# Patient Record
Sex: FEMALE | Race: WHITE | NOT HISPANIC OR LATINO | Employment: UNEMPLOYED | URBAN - METROPOLITAN AREA
[De-identification: names, ages, dates, MRNs, and addresses within clinical notes are randomized per-mention and may not be internally consistent; named-entity substitution may affect disease eponyms.]

---

## 2017-01-16 ENCOUNTER — APPOINTMENT (OUTPATIENT)
Dept: CT IMAGING | Facility: CLINIC | Age: 47
End: 2017-01-16
Attending: INTERNAL MEDICINE

## 2017-01-16 ENCOUNTER — HOSPITAL ENCOUNTER (EMERGENCY)
Facility: CLINIC | Age: 47
Discharge: HOME | End: 2017-01-16
Attending: INTERNAL MEDICINE | Admitting: INTERNAL MEDICINE

## 2017-01-16 VITALS
DIASTOLIC BLOOD PRESSURE: 98 MMHG | RESPIRATION RATE: 12 BRPM | SYSTOLIC BLOOD PRESSURE: 187 MMHG | TEMPERATURE: 98.2 F | OXYGEN SATURATION: 98 %

## 2017-01-16 DIAGNOSIS — J18.9 PNEUMONIA DUE TO INFECTIOUS ORGANISM, UNSPECIFIED LATERALITY, UNSPECIFIED PART OF LUNG: ICD-10-CM

## 2017-01-16 DIAGNOSIS — R04.2 HEMOPTYSIS: ICD-10-CM

## 2017-01-16 LAB
ANION GAP SERPL CALCULATED.3IONS-SCNC: 8 MMOL/L (ref 3–14)
BASOPHILS # BLD AUTO: 0.1 10E9/L (ref 0–0.2)
BASOPHILS NFR BLD AUTO: 0.8 %
BUN SERPL-MCNC: 13 MG/DL (ref 7–30)
CALCIUM SERPL-MCNC: 8.7 MG/DL (ref 8.5–10.1)
CHLORIDE SERPL-SCNC: 105 MMOL/L (ref 94–109)
CO2 SERPL-SCNC: 28 MMOL/L (ref 20–32)
CREAT SERPL-MCNC: 0.8 MG/DL (ref 0.52–1.04)
DIFFERENTIAL METHOD BLD: NORMAL
EOSINOPHIL # BLD AUTO: 0.5 10E9/L (ref 0–0.7)
EOSINOPHIL NFR BLD AUTO: 4.6 %
ERYTHROCYTE [DISTWIDTH] IN BLOOD BY AUTOMATED COUNT: 13.9 % (ref 10–15)
GFR SERPL CREATININE-BSD FRML MDRD: 77 ML/MIN/1.7M2
GLUCOSE SERPL-MCNC: 105 MG/DL (ref 70–99)
HCT VFR BLD AUTO: 42.1 % (ref 35–47)
HGB BLD-MCNC: 13.4 G/DL (ref 11.7–15.7)
IMM GRANULOCYTES # BLD: 0 10E9/L (ref 0–0.4)
IMM GRANULOCYTES NFR BLD: 0.4 %
LYMPHOCYTES # BLD AUTO: 2.2 10E9/L (ref 0.8–5.3)
LYMPHOCYTES NFR BLD AUTO: 20.3 %
MCH RBC QN AUTO: 28.7 PG (ref 26.5–33)
MCHC RBC AUTO-ENTMCNC: 31.8 G/DL (ref 31.5–36.5)
MCV RBC AUTO: 90 FL (ref 78–100)
MONOCYTES # BLD AUTO: 0.8 10E9/L (ref 0–1.3)
MONOCYTES NFR BLD AUTO: 7.3 %
NEUTROPHILS # BLD AUTO: 7.1 10E9/L (ref 1.6–8.3)
NEUTROPHILS NFR BLD AUTO: 66.6 %
NRBC # BLD AUTO: 0 10*3/UL
NRBC BLD AUTO-RTO: 0 /100
PLATELET # BLD AUTO: 271 10E9/L (ref 150–450)
POTASSIUM SERPL-SCNC: 3.8 MMOL/L (ref 3.4–5.3)
RBC # BLD AUTO: 4.67 10E12/L (ref 3.8–5.2)
SODIUM SERPL-SCNC: 141 MMOL/L (ref 133–144)
WBC # BLD AUTO: 10.6 10E9/L (ref 4–11)

## 2017-01-16 PROCEDURE — 80048 BASIC METABOLIC PNL TOTAL CA: CPT | Performed by: INTERNAL MEDICINE

## 2017-01-16 PROCEDURE — 25000125 ZZHC RX 250: Performed by: INTERNAL MEDICINE

## 2017-01-16 PROCEDURE — 25500064 ZZH RX 255 OP 636: Performed by: INTERNAL MEDICINE

## 2017-01-16 PROCEDURE — 94640 AIRWAY INHALATION TREATMENT: CPT

## 2017-01-16 PROCEDURE — 99285 EMERGENCY DEPT VISIT HI MDM: CPT | Mod: 25

## 2017-01-16 PROCEDURE — 85025 COMPLETE CBC W/AUTO DIFF WBC: CPT | Performed by: INTERNAL MEDICINE

## 2017-01-16 PROCEDURE — 25000128 H RX IP 250 OP 636: Performed by: INTERNAL MEDICINE

## 2017-01-16 PROCEDURE — 40000275 ZZH STATISTIC RCP TIME EA 10 MIN

## 2017-01-16 PROCEDURE — 71260 CT THORAX DX C+: CPT

## 2017-01-16 RX ORDER — IPRATROPIUM BROMIDE AND ALBUTEROL SULFATE 2.5; .5 MG/3ML; MG/3ML
3 SOLUTION RESPIRATORY (INHALATION) ONCE
Status: COMPLETED | OUTPATIENT
Start: 2017-01-16 | End: 2017-01-16

## 2017-01-16 RX ORDER — IOPAMIDOL 755 MG/ML
500 INJECTION, SOLUTION INTRAVASCULAR ONCE
Status: COMPLETED | OUTPATIENT
Start: 2017-01-16 | End: 2017-01-16

## 2017-01-16 RX ORDER — ALBUTEROL SULFATE 90 UG/1
2 AEROSOL, METERED RESPIRATORY (INHALATION) EVERY 4 HOURS PRN
Qty: 1 INHALER | Refills: 0 | Status: SHIPPED | OUTPATIENT
Start: 2017-01-16 | End: 2024-02-14

## 2017-01-16 RX ORDER — CODEINE PHOSPHATE AND GUAIFENESIN 10; 100 MG/5ML; MG/5ML
1 SOLUTION ORAL EVERY 4 HOURS PRN
Qty: 120 ML | Refills: 0 | Status: SHIPPED | OUTPATIENT
Start: 2017-01-16 | End: 2024-02-14

## 2017-01-16 RX ORDER — AZITHROMYCIN 250 MG/1
TABLET, FILM COATED ORAL
Qty: 6 TABLET | Refills: 0 | Status: SHIPPED | OUTPATIENT
Start: 2017-01-16 | End: 2017-01-21

## 2017-01-16 RX ADMIN — IOPAMIDOL 85 ML: 755 INJECTION, SOLUTION INTRAVENOUS at 15:41

## 2017-01-16 RX ADMIN — SODIUM CHLORIDE 100 ML: 9 INJECTION, SOLUTION INTRAVENOUS at 15:41

## 2017-01-16 RX ADMIN — IPRATROPIUM BROMIDE AND ALBUTEROL SULFATE 3 ML: .5; 3 SOLUTION RESPIRATORY (INHALATION) at 13:32

## 2017-01-16 ASSESSMENT — ENCOUNTER SYMPTOMS
NAUSEA: 0
ABDOMINAL PAIN: 0
COUGH: 1
VOMITING: 0
DIARRHEA: 0

## 2017-01-16 NOTE — ED AVS SNAPSHOT
[facility] Emergency Department    [address]    [facility] [address]    Phone:  [phone]    Fax:  [phone]                                       Shilpa Miramontes   MRN: [id number]    Department:  [facility] Emergency Department   Date of Visit:  1/16/2017           After Visit Summary Signature Page     I have received my discharge instructions, and my questions have been answered. I have discussed any challenges I see with this plan with the nurse or doctor.    ..........................................................................................................................................  Patient/Patient Representative Signature      ..........................................................................................................................................  Patient Representative Print Name and Relationship to Patient    ..................................................               ................................................  Date                                            Time    ..........................................................................................................................................  Reviewed by Signature/Title    ...................................................              ..............................................  Date                                                            Time

## 2017-01-16 NOTE — ED NOTES
Around Alex started with fatigue, body aches and fever. No cough. Two weeks later was feeling better and then started with cough. One week history of blood sputum with cough. Denies night sweats. Notes if she is outside, cough becomes more severe. No fever for the past two weeks. Has not seen her primary doctor, here today because she is not getting better.

## 2017-01-16 NOTE — DISCHARGE INSTRUCTIONS
Discharge Instructions  Bronchitis, Pneumonia, Bronchospasm    You were seen today for a chest infection or inflammation. If your doctor decided this was due to a bacterial infection, you may need an antibiotic. Sometimes these are caused by a virus, and then an antibiotic will not help.     Return to the Emergency Department if:    Your breathing gets much worse.    You are very weak, or feel much more ill.    You develop new symptoms, such as chest pain.    You cough up blood.    You are vomiting enough that you can t keep fluids or your medicine down.    What can I do to help myself?    Fill any prescriptions the doctor gave you and take them right away--especially antibiotics. Be sure to finish the whole antibiotic prescription.    You may be given a prescription for an inhaler, which can help loosen tight air passages.  Use this as needed, but not more often than directed. Inhalers work much better when used with a spacer.     You may be given a prescription for a steroid to reduce inflammation. Used long-term, these can have many serious side effects, but for short courses these do not happen. You may notice restlessness or increased appetite.        You may use non-prescription cough or cold medicines. Cough medicines may help, but don t make the cough go away completely.     Avoid smoke, because this can make your symptoms worse. If you smoke, this may be a good time to quit! Consider using nicotine lozenges, gum, or patches to reduce cravings.     If you have a fever, Tylenol  (acetaminophen), Motrin  (ibuprofen), or Advil  (ibuprofen) may help bring fever down and may help you feel more comfortable. Be sure to read and follow the package directions, and ask your doctor if you have questions.    Be sure to get your flu shot each year.  The pneumonia shot can help prevent pneumonia.  Probiotics: If you have been given an antibiotic, you may want to also take a probiotic pill or eat yogurt with live cultures.  "Probiotics have \"good bacteria\" to help your intestines stay healthy. Studies have shown that probiotics help prevent diarrhea and other intestine problems (including C. diff infection) when you take antibiotics. You can buy these without a prescription in the pharmacy section of the store.     If your doctor has told you to follow-up at your clinic, be sure to call right away and go to your appointment.  If there is any problem with keeping your appointment, call your doctor or return to the Emergency Department.    If you were given a prescription for medicine here today, be sure to read all of the information (including the package insert) that comes with your prescription.  This will include important information about the medicine, its side effects, and any warnings that you need to know about.  The pharmacist who fills the prescription can provide more information and answer questions you may have about the medicine.  If you have questions or concerns that the pharmacist cannot address, please call or return to the Emergency Department.     Opioid Medication Information    Pain medications are among the most commonly prescribed medicines, so we are including this information for all our patients. If you did not receive pain medication or get a prescription for pain medicine, you can ignore it.     You may have been given a prescription for an opioid (narcotic) pain medicine and/or have received a pain medicine while here in the Emergency Department. These medicines can make you drowsy or impaired. You must not drive, operate dangerous equipment, or engage in any other dangerous activities while taking these medications. If you drive while taking these medications, you could be arrested for DUI, or driving under the influence. Do not drink any alcohol while you are taking these medications.     Opioid pain medications can cause addiction. If you have a history of chemical dependency of any type, you are at a " higher risk of becoming addicted to pain medications.  Only take these prescribed medications to treat your pain when all other options have been tried. Take it for as short a time and as few doses as possible. Store your pain pills in a secure place, as they are frequently stolen and provide a dangerous opportunity for children or visitors in your house to start abusing these powerful medications. We will not replace any lost or stolen medicine.  As soon as your pain is better, you should flush all your remaining medication.     Many prescription pain medications contain Tylenol  (acetaminophen), including Vicodin , Tylenol #3 , Norco , Lortab , and Percocet .  You should not take any extra pills of Tylenol  if you are using these prescription medications or you can get very sick.  Do not ever take more than 3000 mg of acetaminophen in any 24 hour period.    All opioids tend to cause constipation. Drink plenty of water and eat foods that have a lot of fiber, such as fruits, vegetables, prune juice, apple juice and high fiber cereal.  Take a laxative if you don t move your bowels at least every other day. Miralax , Milk of Magnesia, Colace , or Senna  can be used to keep you regular.      Remember that you can always come back to the Emergency Department if you are not able to see your regular doctor in the amount of time listed above, if you get any new symptoms, or if there is anything that worries you.

## 2017-01-16 NOTE — ED NOTES
New RN introduced to pt at bedside report. Pt states the neb helped and she is feeling better. Pt updated that she is still in line for CT. No other needs or complaints at this time. ABCs intact, pt A&Ox3

## 2017-01-16 NOTE — ED AVS SNAPSHOT
[facility] Emergency Department    [address] [place] [address]    Phone:  [phone]    Fax:  [phone]                                       Shilpa Miramontes   MRN: [id number]    Department:  [facility] Emergency Department   Date of Visit:  1/16/2017           Patient Information     Date Of Birth          1970        Your diagnoses for this visit were:     Pneumonia due to infectious organism, unspecified laterality, unspecified part of lung     Hemoptysis        You were seen by Silvia Bah MD.      Follow-up Information     Follow up with Mel Murphy MD In 3 days.    Specialty:  Family Practice    Contact information:    XXX NO INFO FOUND XXX  [address]  [place] [address]  [phone]          Discharge Instructions       Discharge Instructions  Bronchitis, Pneumonia, Bronchospasm    You were seen today for a chest infection or inflammation. If your doctor decided this was due to a bacterial infection, you may need an antibiotic. Sometimes these are caused by a virus, and then an antibiotic will not help.     Return to the Emergency Department if:    Your breathing gets much worse.    You are very weak, or feel much more ill.    You develop new symptoms, such as chest pain.    You cough up blood.    You are vomiting enough that you can t keep fluids or your medicine down.    What can I do to help myself?    Fill any prescriptions the doctor gave you and take them right away--especially antibiotics. Be sure to finish the whole antibiotic prescription.    You may be given a prescription for an inhaler, which can help loosen tight air passages.  Use this as needed, but not more often than directed. Inhalers work much better when used with a spacer.     You may be given a prescription for a steroid to reduce inflammation. Used long-term, these can have many serious side effects, but for short courses these do not  "happen. You may notice restlessness or increased appetite.        You may use non-prescription cough or cold medicines. Cough medicines may help, but don t make the cough go away completely.     Avoid smoke, because this can make your symptoms worse. If you smoke, this may be a good time to quit! Consider using nicotine lozenges, gum, or patches to reduce cravings.     If you have a fever, Tylenol  (acetaminophen), Motrin  (ibuprofen), or Advil  (ibuprofen) may help bring fever down and may help you feel more comfortable. Be sure to read and follow the package directions, and ask your doctor if you have questions.    Be sure to get your flu shot each year.  The pneumonia shot can help prevent pneumonia.  Probiotics: If you have been given an antibiotic, you may want to also take a probiotic pill or eat yogurt with live cultures. Probiotics have \"good bacteria\" to help your intestines stay healthy. Studies have shown that probiotics help prevent diarrhea and other intestine problems (including C. diff infection) when you take antibiotics. You can buy these without a prescription in the pharmacy section of the store.     If your doctor has told you to follow-up at your clinic, be sure to call right away and go to your appointment.  If there is any problem with keeping your appointment, call your doctor or return to the Emergency Department.    If you were given a prescription for medicine here today, be sure to read all of the information (including the package insert) that comes with your prescription.  This will include important information about the medicine, its side effects, and any warnings that you need to know about.  The pharmacist who fills the prescription can provide more information and answer questions you may have about the medicine.  If you have questions or concerns that the pharmacist cannot address, please call or return to the Emergency Department.     Opioid Medication Information    Pain " medications are among the most commonly prescribed medicines, so we are including this information for all our patients. If you did not receive pain medication or get a prescription for pain medicine, you can ignore it.     You may have been given a prescription for an opioid (narcotic) pain medicine and/or have received a pain medicine while here in the Emergency Department. These medicines can make you drowsy or impaired. You must not drive, operate dangerous equipment, or engage in any other dangerous activities while taking these medications. If you drive while taking these medications, you could be arrested for DUI, or driving under the influence. Do not drink any alcohol while you are taking these medications.     Opioid pain medications can cause addiction. If you have a history of chemical dependency of any type, you are at a higher risk of becoming addicted to pain medications.  Only take these prescribed medications to treat your pain when all other options have been tried. Take it for as short a time and as few doses as possible. Store your pain pills in a secure place, as they are frequently stolen and provide a dangerous opportunity for children or visitors in your house to start abusing these powerful medications. We will not replace any lost or stolen medicine.  As soon as your pain is better, you should flush all your remaining medication.     Many prescription pain medications contain Tylenol  (acetaminophen), including Vicodin , Tylenol #3 , Norco , Lortab , and Percocet .  You should not take any extra pills of Tylenol  if you are using these prescription medications or you can get very sick.  Do not ever take more than 3000 mg of acetaminophen in any 24 hour period.    All opioids tend to cause constipation. Drink plenty of water and eat foods that have a lot of fiber, such as fruits, vegetables, prune juice, apple juice and high fiber cereal.  Take a laxative if you don t move your bowels at  least every other day. Miralax , Milk of Magnesia, Colace , or Senna  can be used to keep you regular.      Remember that you can always come back to the Emergency Department if you are not able to see your regular doctor in the amount of time listed above, if you get any new symptoms, or if there is anything that worries you.        24 Hour Appointment Hotline       To make an appointment at any [facility], call [phone]). If you don't have a family doctor or clinic, we will help you find one. [facility] are conveniently located to serve the needs of you and your family.             Review of your medicines      START taking        Dose / Directions Last dose taken    albuterol 108 (90 BASE) MCG/ACT Inhaler   Commonly known as:  albuterol   Dose:  2 puff   Quantity:  1 Inhaler        Inhale 2 puffs into the lungs every 4 hours as needed for shortness of breath / dyspnea   Refills:  0        azithromycin 250 MG tablet   Commonly known as:  ZITHROMAX Z-JAMAAL   Quantity:  6 tablet        Two tablets on the first day, then one tablet daily for the next 4 days   Refills:  0        guaiFENesin-codeine 100-10 MG/5ML Soln solution   Commonly known as:  ROBITUSSIN AC   Dose:  1 tsp.   Quantity:  120 mL        Take 5 mLs by mouth every 4 hours as needed for cough   Refills:  0          Our records show that you are taking the medicines listed below. If these are incorrect, please call your family doctor or clinic.        Dose / Directions Last dose taken    amoxicillin-clavulanate 875-125 MG per tablet   Commonly known as:  AUGMENTIN   Dose:  1 tablet   Quantity:  20 tablet        Take 1 tablet by mouth 2 times daily   Refills:  0                Prescriptions were sent or printed at these locations (3 Prescriptions)                   Other Prescriptions                Printed at Department/Unit printer (3 of 3)         azithromycin (ZITHROMAX Z-JAMAAL) 250 MG tablet                guaiFENesin-codeine (ROBITUSSIN AC) 100-10 MG/5ML SOLN solution               albuterol (ALBUTEROL) 108 (90 BASE) MCG/ACT Inhaler                Procedures and tests performed during your visit     Basic metabolic panel    CBC with platelets differential    CT Chest Pulmonary Embolism w Contrast    Cardiac Continuous Monitoring    Peripheral IV: Standard    Pulse oximetry nursing      Orders Needing Specimen Collection     None      Pending Results     No orders found from 1/15/2017 to 1/17/2017.            Pending Culture Results     No orders found from 1/15/2017 to 1/17/2017.       Test Results from your hospital stay           1/16/2017  1:55 PM - Interface, Flexilab Results      Component Results     Component Value Ref Range & Units Status    WBC 10.6 4.0 - 11.0 10e9/L Final    RBC Count 4.67 3.8 - 5.2 10e12/L Final    Hemoglobin 13.4 11.7 - 15.7 g/dL Final    Hematocrit 42.1 35.0 - 47.0 % Final    MCV 90 78 - 100 fl Final    MCH 28.7 26.5 - 33.0 pg Final    MCHC 31.8 31.5 - 36.5 g/dL Final    RDW 13.9 10.0 - 15.0 % Final    Platelet Count 271 150 - 450 10e9/L Final    Diff Method Automated Method  Final    % Neutrophils 66.6 % Final    % Lymphocytes 20.3 % Final    % Monocytes 7.3 % Final    % Eosinophils 4.6 % Final    % Basophils 0.8 % Final    % Immature Granulocytes 0.4 % Final    Nucleated RBCs 0 0 /100 Final    Absolute Neutrophil 7.1 1.6 - 8.3 10e9/L Final    Absolute Lymphocytes 2.2 0.8 - 5.3 10e9/L Final    Absolute Monocytes 0.8 0.0 - 1.3 10e9/L Final    Absolute Eosinophils 0.5 0.0 - 0.7 10e9/L Final    Absolute Basophils 0.1 0.0 - 0.2 10e9/L Final    Abs Immature Granulocytes 0.0 0 - 0.4 10e9/L Final    Absolute Nucleated RBC 0.0  Final         1/16/2017  2:08 PM - Interface, Flexilab Results      Component Results     Component Value Ref Range & Units Status    Sodium 141 133 - 144 mmol/L Final    Potassium 3.8 3.4 - 5.3 mmol/L Final    Chloride 105 94 - 109 mmol/L Final    Carbon Dioxide 28 20 -  32 mmol/L Final    Anion Gap 8 3 - 14 mmol/L Final    Glucose 105 (H) 70 - 99 mg/dL Final    Urea Nitrogen 13 7 - 30 mg/dL Final    Creatinine 0.80 0.52 - 1.04 mg/dL Final    GFR Estimate 77 >60 mL/min/1.7m2 Final    Non  GFR Calc    GFR Estimate If Black >90   GFR Calc   >60 mL/min/1.7m2 Final    Calcium 8.7 8.5 - 10.1 mg/dL Final         1/16/2017  4:11 PM - Interface, Radiant Ib      Narrative     CT CHEST PULMONARY EMBOLISM WITH CONTRAST  1/16/2017 3:43 PM     HISTORY:  Hemoptysis. Chest pain.    COMPARISON: None.    TECHNIQUE: CT pulmonary angiogram was performed following the  administration of 85 mL Isovue-370 contrast. Radiation dose for this  scan was reduced using automated exposure control, adjustment of the  mA and/or kV according to patient size, or iterative reconstruction  technique.    FINDINGS: There is no definite pulmonary embolism.    There is a small right pleural effusion. There appear to be focal  infiltrates in the right lower lobe and left upper lobe.        Impression     IMPRESSION:   1. No definite pulmonary embolism.  2. Focal infiltrates in the lungs as above.  Correlate clinically for  acute pneumonia.   3. Small right pleural effusion.    BECKY KIRAN MD                Clinical Quality Measure: Blood Pressure Screening     Your blood pressure was checked while you were in the emergency department today. The last reading we obtained was  BP: (!) 171/96 mmHg . Please read the guidelines below about what these numbers mean and what you should do about them.  If your systolic blood pressure (the top number) is less than 120 and your diastolic blood pressure (the bottom number) is less than 80, then your blood pressure is normal. There is nothing more that you need to do about it.  If your systolic blood pressure (the top number) is 120-139 or your diastolic blood pressure (the bottom number) is 80-89, your blood pressure may be higher than it should be.  "You should have your blood pressure rechecked within a year by a primary care provider.  If your systolic blood pressure (the top number) is 140 or greater or your diastolic blood pressure (the bottom number) is 90 or greater, you may have high blood pressure. High blood pressure is treatable, but if left untreated over time it can put you at risk for heart attack, stroke, or kidney failure. You should have your blood pressure rechecked by a primary care provider within the next 4 weeks.  If your provider in the emergency department today gave you specific instructions to follow-up with your doctor or provider even sooner than that, you should follow that instruction and not wait for up to 4 weeks for your follow-up visit.        Thank you for choosing [facility]       Thank you for choosing [facility] for your care. Our goal is always to provide you with excellent care. Hearing back from our patients is one way we can continue to improve our services. Please take a few minutes to complete the written survey that you may receive in the mail after you visit with us. Thank you!        [employer] Information     [employer] lets you send messages to your doctor, view your test results, renew your prescriptions, schedule appointments and more. To sign up, go to [email or web address] . Click on \"Log in\" on the left side of the screen, which will take you to the Welcome page. Then click on \"Sign up Now\" on the right side of the page.     You will be asked to enter the access code listed below, as well as some personal information. Please follow the directions to create your username and password.     Your access code is: [email or web address]  Expires: 2017  4:17 PM     Your access code will  in 90 days. If you need help or a new code, please call your [facility] clinic or [phone].        Care EveryWhere ID     This is your Care EveryWhere ID. This could be used by other organizations to access your [facility] medical " records  [id number]        After Visit Summary       This is your record. Keep this with you and show to your community pharmacist(s) and doctor(s) at your next visit.

## 2017-01-16 NOTE — ED PROVIDER NOTES
History     Chief Complaint:  Cough      ELLIOT Miramontes is a 46 year old female who presents to the emergency department today for evaluation of cough. The patient stated that she had cold symptoms which included fatigue, body aches, and fever late December which she notes improved two weeks later. The patient then began to have increased cough, especially when laying down, working, or going outside, that was accompanied with blood sputum. The patient stated that she initially had wheezing but does not have one now. She also stated that she has intermittent abdominal pain since the onset of her symptoms and report generalized back pain. Furthermore, the patient noted that she is usually a heavy bleeder during her menstrual cycles but only noticed spotting during her last period. The patient denied nausea, vomiting, diarrhea, constipation, chance of pregnancy or history of blood clots. Of note, the patient stated that she noticed increased leg swelling and believed that it was primary caused by her high sodium diet.    Allergies:  Percocet     Medications:    Augmentin      Past Medical History:    History reviewed. No pertinent past medical history.     Past Surgical History:    History reviewed. No pertinent past surgical history.     Family History:    Paternal Grandfather: Prostate cancer      Social History:  The patient was accompanied to the ED by partner.  Smoking Status: Current every day smoker  Smokeless Tobacco: Never used  Alcohol Use: Yes  Marital Status:  Single [1]     Review of Systems   Respiratory: Positive for cough.    Cardiovascular: Positive for leg swelling.   Gastrointestinal: Negative for nausea, vomiting, abdominal pain and diarrhea.   All other systems reviewed and are negative.    Physical Exam   Vitals:  Patient Vitals for the past 24 hrs:   BP Temp Temp src Heart Rate Resp SpO2   01/16/17 1222 (!) 170/111 mmHg 98.2  F (36.8  C) Oral 88 16 99 %        Physical Exam    Constitutional: She is cooperative.   HENT:   Right Ear: Tympanic membrane normal.   Left Ear: Tympanic membrane normal.   Mouth/Throat: Oropharynx is clear and moist and mucous membranes are normal.   Eyes: Conjunctivae are normal.   Neck: Normal range of motion.   Cardiovascular: Regular rhythm.    Murmur heard.  Pulmonary/Chest: Effort normal and breath sounds normal.   Abdominal: Soft. Normal appearance and bowel sounds are normal. There is no rebound and no guarding.   Musculoskeletal: Normal range of motion.   Lymphadenopathy:     She has no cervical adenopathy.   Neurological: She is alert.   Skin: Skin is warm and dry.   Psychiatric: She has a normal mood and affect.     Emergency Department Course   Imaging:  Radiology findings were communicated with the patient who voiced understanding of the findings.    CT Chest Pulmonary Embolism w Contrast:  1. No definite pulmonary embolism.  2. Focal infiltrates in the lungs as above.   3. Correlate clinically for acute pneumonia.   4. Small right pleural effusion.  Reading per radiology      Laboratory:  Laboratory findings were communicated with the patient who voiced understanding of the findings.    CBC: AWNL. (WBC 10.6, HGB 13.4, )     Basic metabolic panel: Glucose: 105(H)    Interventions:  1332- Duoneb 3 mL Nebulizer    1541-  mL IV    Emergency Department Course:  Nursing notes and vitals reviewed.  I performed an exam of the patient as documented above.     IV was inserted and blood was drawn for laboratory testing, results above.    The patient was sent for a CT while in the emergency department, results above.     I discussed the treatment plan with the patient. They expressed understanding of this plan and consented to discharge.    I personally reviewed the laboratory results with the Patient and answered all related questions prior to discharge.    Impression & Plan      Medical Decision Making:  Shilpa Miramontes is a 46 year old female  who presents to the emergency department with prolonged cough and now several episodes of hemoptysis. With this presentation I felt it was necessary to rule out serious diagnosis serious diagnosis including pulmonary embolism, tumor. Fortunately CT shows findings  consistent with pneumonia but no PE or other significant  abnormality. Here we did give a Duoneb which made the patient feel a lot better. Repeat exam shows improved air entry. I will discharge her on oral Zithromax, Albuterol metered dose inhaler to improve mucociliary clearance, Robitussin AC for cough, return  if worse or new symptoms. Follow up within 2-3 days in clinic. Discussed limiting or quitting smoking.        Diagnosis:    ICD-10-CM    1. Pneumonia due to infectious organism, unspecified laterality, unspecified part of lung J18.9    2. Hemoptysis R04.2          Disposition:   The patient was discharge.        Discharge Medications:  New Prescriptions    ALBUTEROL (ALBUTEROL) 108 (90 BASE) MCG/ACT INHALER    Inhale 2 puffs into the lungs every 4 hours as needed for shortness of breath / dyspnea    AZITHROMYCIN (ZITHROMAX Z-JAMAAL) 250 MG TABLET    Two tablets on the first day, then one tablet daily for the next 4 days    GUAIFENESIN-CODEINE (ROBITUSSIN AC) 100-10 MG/5ML SOLN SOLUTION    Take 5 mLs by mouth every 4 hours as needed for cough       Scribe Disclosure:  I, Germaine Andino, am serving as a scribe at 1:14 PM on 1/16/2017 to document services personally performed by Silvia Bah MD, based on my observations and the provider's statements to me.    1/16/2017   [facility] EMERGENCY DEPARTMENT        Silvia Bah MD  01/20/17 [id number]

## 2017-07-01 ENCOUNTER — HEALTH MAINTENANCE LETTER (OUTPATIENT)
Age: 47
End: 2017-07-01

## 2018-01-24 ENCOUNTER — HOSPITAL ENCOUNTER (EMERGENCY)
Facility: CLINIC | Age: 48
Discharge: HOME | End: 2018-01-24
Attending: EMERGENCY MEDICINE | Admitting: EMERGENCY MEDICINE
Payer: MEDICAID

## 2018-01-24 ENCOUNTER — APPOINTMENT (OUTPATIENT)
Dept: GENERAL RADIOLOGY | Facility: CLINIC | Age: 48
End: 2018-01-24
Attending: EMERGENCY MEDICINE
Payer: MEDICAID

## 2018-01-24 VITALS
OXYGEN SATURATION: 91 % | HEIGHT: 62 IN | RESPIRATION RATE: 24 BRPM | WEIGHT: 260 LBS | TEMPERATURE: 101.7 F | HEART RATE: 90 BPM | DIASTOLIC BLOOD PRESSURE: 75 MMHG | BODY MASS INDEX: 47.84 KG/M2 | SYSTOLIC BLOOD PRESSURE: 148 MMHG

## 2018-01-24 DIAGNOSIS — R03.0 ELEVATED BLOOD-PRESSURE READING WITHOUT DIAGNOSIS OF HYPERTENSION: ICD-10-CM

## 2018-01-24 DIAGNOSIS — Z87.891 PERSONAL HISTORY OF TOBACCO USE, PRESENTING HAZARDS TO HEALTH: ICD-10-CM

## 2018-01-24 DIAGNOSIS — J98.4 LUNG DISEASE: ICD-10-CM

## 2018-01-24 DIAGNOSIS — J18.9 PNEUMONIA OF RIGHT LOWER LOBE DUE TO INFECTIOUS ORGANISM: ICD-10-CM

## 2018-01-24 LAB
FLUAV+FLUBV AG SPEC QL: NEGATIVE
FLUAV+FLUBV AG SPEC QL: NEGATIVE
SPECIMEN SOURCE: NORMAL

## 2018-01-24 PROCEDURE — 94664 DEMO&/EVAL PT USE INHALER: CPT

## 2018-01-24 PROCEDURE — 99285 EMERGENCY DEPT VISIT HI MDM: CPT | Mod: 25

## 2018-01-24 PROCEDURE — 94640 AIRWAY INHALATION TREATMENT: CPT

## 2018-01-24 PROCEDURE — 25000125 ZZHC RX 250: Performed by: EMERGENCY MEDICINE

## 2018-01-24 PROCEDURE — 87804 INFLUENZA ASSAY W/OPTIC: CPT | Mod: 91 | Performed by: EMERGENCY MEDICINE

## 2018-01-24 PROCEDURE — 71046 X-RAY EXAM CHEST 2 VIEWS: CPT

## 2018-01-24 PROCEDURE — 99284 EMERGENCY DEPT VISIT MOD MDM: CPT | Mod: 25

## 2018-01-24 PROCEDURE — 40000275 ZZH STATISTIC RCP TIME EA 10 MIN

## 2018-01-24 PROCEDURE — 25000132 ZZH RX MED GY IP 250 OP 250 PS 637: Performed by: EMERGENCY MEDICINE

## 2018-01-24 RX ORDER — INHALER, ASSIST DEVICES
1 SPACER (EA) MISCELLANEOUS ONCE
Status: COMPLETED | OUTPATIENT
Start: 2018-01-24 | End: 2018-01-24

## 2018-01-24 RX ORDER — IPRATROPIUM BROMIDE AND ALBUTEROL SULFATE 2.5; .5 MG/3ML; MG/3ML
3 SOLUTION RESPIRATORY (INHALATION)
Status: DISPENSED | OUTPATIENT
Start: 2018-01-24 | End: 2018-01-24

## 2018-01-24 RX ORDER — ACETAMINOPHEN 500 MG
1000 TABLET ORAL ONCE
Status: COMPLETED | OUTPATIENT
Start: 2018-01-24 | End: 2018-01-24

## 2018-01-24 RX ORDER — ALBUTEROL SULFATE 90 UG/1
2 AEROSOL, METERED RESPIRATORY (INHALATION) ONCE
Status: COMPLETED | OUTPATIENT
Start: 2018-01-24 | End: 2018-01-24

## 2018-01-24 RX ORDER — LEVOFLOXACIN 750 MG/1
750 TABLET, FILM COATED ORAL DAILY
Qty: 5 TABLET | Refills: 0 | Status: SHIPPED | OUTPATIENT
Start: 2018-01-24 | End: 2024-02-14

## 2018-01-24 RX ADMIN — ACETAMINOPHEN 1000 MG: 500 TABLET, FILM COATED ORAL at 17:55

## 2018-01-24 RX ADMIN — IPRATROPIUM BROMIDE AND ALBUTEROL SULFATE 3 ML: .5; 3 SOLUTION RESPIRATORY (INHALATION) at 17:54

## 2018-01-24 RX ADMIN — Medication 1 EACH: at 19:29

## 2018-01-24 RX ADMIN — ALBUTEROL SULFATE 2 PUFF: 90 AEROSOL, METERED RESPIRATORY (INHALATION) at 19:23

## 2018-01-24 RX ADMIN — IPRATROPIUM BROMIDE AND ALBUTEROL SULFATE 3 ML: .5; 3 SOLUTION RESPIRATORY (INHALATION) at 18:24

## 2018-01-24 RX ADMIN — LEVOFLOXACIN 750 MG: 500 TABLET, FILM COATED ORAL at 18:44

## 2018-01-24 ASSESSMENT — ENCOUNTER SYMPTOMS
NAUSEA: 1
RHINORRHEA: 0
VOMITING: 0
SHORTNESS OF BREATH: 1
COUGH: 1
MYALGIAS: 1
ABDOMINAL PAIN: 0
SORE THROAT: 0
FEVER: 1
DIARRHEA: 0

## 2018-01-24 NOTE — ED AVS SNAPSHOT
[facility] Emergency Department    [address]    [facility] [address]    Phone:  [phone]    Fax:  [phone]                                       Shilpa Miramontes   MRN: [id number]    Department:  [facility] Emergency Department   Date of Visit:  1/24/2018           After Visit Summary Signature Page     I have received my discharge instructions, and my questions have been answered. I have discussed any challenges I see with this plan with the nurse or doctor.    ..........................................................................................................................................  Patient/Patient Representative Signature      ..........................................................................................................................................  Patient Representative Print Name and Relationship to Patient    ..................................................               ................................................  Date                                            Time    ..........................................................................................................................................  Reviewed by Signature/Title    ...................................................              ..............................................  Date                                                            Time

## 2018-01-24 NOTE — ED PROVIDER NOTES
History     Chief Complaint:  Cough    HPI   Shilpa Miramontes is a 47 year old female, pack per day smoker, who presents for evaluation of cough and fever. The patient reports she developed body aches and fever 4-5 days ago, followed by a cough 3-4 days ago. She reports her cough has been more productive over the last couple of days and she feels short of breath at times with the cough. The patient noticed a small amount of blood in her sputum today and was concerned because she had hemoptysis with her prior episode of pneumonia about a year ago, prompting her to come to the ED for evaluation. The patient notes some nausea as well but denies any abdominal pain, vomiting, diarrhea, sore throat, or ear pain. No chest pain, shortness of breath, leg pain or swelling. The patient has never been diagnosed with asthma or COPD but is a long time smoker.     Allergies:  Percocet     Medications:    The patient is not currently taking any prescribed medications.    Past Medical History:    Hypertension, not on medications    Past Surgical History:    Cholecystectomy    Family History:    History reviewed. No pertinent family history.     Social History:  Smoking status: Current 1 ppd smoker  Alcohol use: Yes, occasional   Marital Status:  Single [1]     Review of Systems   Constitutional: Positive for fever.   HENT: Negative for ear pain, rhinorrhea and sore throat.    Respiratory: Positive for cough and shortness of breath.    Cardiovascular: Negative for chest pain and leg swelling.   Gastrointestinal: Positive for nausea. Negative for abdominal pain, diarrhea and vomiting.   Musculoskeletal: Positive for myalgias.   All other systems reviewed and are negative.      Physical Exam   Patient Vitals for the past 24 hrs:   BP Temp Temp src Heart Rate Resp SpO2 Height Weight   01/24/18 1824 - - - - - 98 % - -   01/24/18 1732 - - - - - 95 % - -   01/24/18 1731 (!) 180/104 - - 88 - - - -   01/24/18 1653 195/90 101.7  F (38.7  " C) Oral - - - - -   01/24/18 1647 - - - 88 24 90 % 1.575 m (5' 2\") 117.9 kg (260 lb)     Physical Exam  General: Well appearing, nontoxic. Resting comfortably. Room smells of cigarette smoke  Head:  Scalp, face, and head appear normal  Eyes:  Pupils are equal, round, and reactive to light    Conjunctivae non-injected and sclerae white  ENT:    The external nose is normal    Pinnae are normal. Bilateral TMs clear without erythema, bulging, or effusion. Auditory canals normal.     The oropharynx is normal, mucous membranes moist    Posterior pharynx clear without swelling, exudates or erythema    Uvula is in the midline  Neck:  Normal range of motion    There is no rigidity noted    Trachea is in the midline  CV:  Regular rate and rhythm     Normal S1/S2, no S3/S4    No murmur or rub  Resp:  Lungs are equal bilaterally    Diffuse expiratory wheezing throughout all lung fields.  Prolonged expiratory phase.    There is no tachypnea    No increased work of breathing    No rales or rhonchi  GI:  Abdomen is soft, no rigidity or guarding    No distension, or mass    No tenderness or rebound tenderness   MS:  Normal muscular tone    Symmetric motor strength    No lower extremity edema  Skin:  No rash or acute skin lesions noted  Neuro: Awake and alert    Speech is normal and fluent    Moves all extremities spontaneously  Psych:  Normal affect.  Appropriate interactions.      Emergency Department Course   Imaging:  Radiographic findings were communicated with the patient who voiced understanding of the findings.    XR Chest 2 views  1. Right basilar pneumonia.  2. Pulmonary vascular congestion.  As read by Radiology.    Laboratory:  Influenza: Negative    Interventions:    1755: Tylenol 1,000 mg oral  1824: Duoneb 3 mL  1844: Levaquin 750 mg oral  1923: Albuterol MDI inhaler and spacer device    Emergency Department Course:  Past medical records, nursing notes, and vitals reviewed.  1739: I performed an exam of the patient " and obtained history, as documented above.  Influenza sent, results above.   The patient was sent for a chest x-ray while in the emergency department, findings above.    1827: I rechecked the patient and discussed findings.     I spent between 3-5 minutes counseling patient in tobacco cessation.     I rechecked the patient.  Findings and plan explained to the Patient. Patient discharged home with instructions regarding supportive care, medications, and reasons to return. The importance of close follow-up was reviewed.     Impression & Plan      Medical Decision Making:  Shilpa Miramontes is a 47 year old female who presents for evaluation of cough and fever.  History, physical exam and imaging studies are consistent with pneumonia.  First dose of antibiotics given in ED.  There are no signs of complications of pneumonia at this point such as septic shock, bacteremia, empyema, hypoxia, respiratory failure or compromise.  Supportive outpatient management is therefore indicated. This seems to be community acquired pneumonia and there are no risk factors at this point for coverage of antibiotic-resistant strains.  I doubt PE, dissection, acute coronary syndrome, or other worrisome etiology for patients symptoms. Patient is a pack per day smoker and based on her pulmonary exam today, I suspect there may be a component of undiagnosed COPD.  She was provided with duo nebs in the emergency department and had improvement in her cough and subjective shortness of breath. This was discussed with the patient and I did spend 3-5 minutes counseling the patient in smoking cessation.  We discussed the possibility that she may have undiagnosed COPD and that it is very important for her to follow-up closely with her primary care physician as well as a lung specialist.  I would not expect the degree of findings on examination bilaterally from a single isolated basilar pneumonia without underlying lung disease. Patient will follow-up  with primary care physician regardless of disease course within 2 days maximum.  Signs for return visit to ED were discussed with patient and pneumonia precautions given for home. Patient was provided with an albuterol inhaler with spacer to use as needed at home. Return precautions were discussed with patient. The patient's questions were answered and the patient was agreeable with discharge.      Diagnosis:    ICD-10-CM   1. Pneumonia of right lower lobe due to infectious organism (H) J18.1   2. Lung disease J98.4   3. Personal history of tobacco use, presenting hazards to health Z87.891       Disposition: Discharged to home    Discharge Medications:  New Prescriptions    LEVOFLOXACIN (LEVAQUIN) 750 MG TABLET    Take 1 tablet (750 mg) by mouth daily       Che Jeong  1/24/2018   [facility] EMERGENCY DEPARTMENT    IChe, am serving as a scribe at 5:39 PM on 1/24/2018 to document services personally performed by Kwame Li MD based on my observations and the provider's statements to me.        Kwame Li MD  01/24/18 1936

## 2018-01-24 NOTE — ED AVS SNAPSHOT
[facility] Emergency Department    [address]    [facility] [address]    Phone:  [phone]    Fax:  [phone]                                       Shilpa Miramontes   MRN: [id number]    Department:  [facility] Emergency Department   Date of Visit:  1/24/2018           Patient Information     Date Of Birth          1970        Your diagnoses for this visit were:     Pneumonia of right lower lobe due to infectious organism (H)     Lung disease     Personal history of tobacco use, presenting hazards to health     Elevated blood-pressure reading without diagnosis of hypertension        You were seen by Kwame Li MD.      Follow-up Information     Follow up with [facility]. Schedule an appointment as soon as possible for a visit in 1 week.    Why:  For close follow up    Contact information:    [facility]  [address]  [address]  [place] [address]  Appointments:  [phone]        Follow up with [facility]. Schedule an appointment as soon as possible for a visit in 2 days.    Specialty:  Family Medicine    Why:  For close follow up    Contact information:    [address] [facility] [address] [phone]        Discharge Instructions         Your lung exam today was significantly abnormal. More than I would expect for just having pneumonia.  It is likely that your smoking is having damaging effects to your lungs. If you have lung disease it makes it much easier for you to get pneumonia and harder to recover from it. It will be very important for you to follow up with the [place] [facility] and your Primary Care physician. If lung disease is treated early it can prevent many serious problems including early death and lung cancer. Consider stopping smoking, this is THE SINGLE MOST IMPORTANT THING TO HELP YOUR LUNGS. Your primary care doctor as well as the  resources on www.smokefree.gov can help you with this process.    Use the inhaler 2 puffs with the spacer every 4 hours as needed for shortness of breath or cough.    Pneumonia (Adult)  Pneumonia is an infection deep within the lungs. It is in the small air sacs (alveoli). Pneumonia may be caused by a virus or bacteria. Pneumonia caused by bacteria is usually treated with an antibiotic. Severe cases may need to be treated in the hospital. Milder cases can be treated at home. Symptoms usually start to get better during the first 2 days of treatment.    Home care  Follow these guidelines when caring for yourself at home:    Rest at home for the first 2 to 3 days, or until you feel stronger. Don t let yourself get overly tired when you go back to your activities.    Stay away from cigarette smoke - yours or other people s.    You may use acetaminophen or ibuprofen to control fever or pain, unless another medicine was prescribed. If you have chronic liver or kidney disease, talk with your healthcare provider before using these medicines. Also talk with your provider if you ve had a stomach ulcer or gastrointestinal bleeding. Don t give aspirin to anyone younger than 18 years of age who is ill with a fever. It may cause severe liver damage.    Your appetite may be poor, so a light diet is fine.    Drink 6 to 8 glasses of fluids every day to make sure you are getting enough fluids. Beverages can include water, sport drinks, sodas without caffeine, juices, tea, or soup. Fluids will help loosen secretions in the lung. This will make it easier for you to cough up the phlegm (sputum). If you also have heart or kidney disease, check with your healthcare provider before you drink extra fluids.    Take antibiotic medicine prescribed until it is all gone, even if you are feeling better after a few days.  Follow-up care  Follow up with your healthcare provider in the next 2 to 3 days, or as advised. This is to be sure the medicine  is helping you get better.  If you are 65 or older, you should get a pneumococcal vaccine and a yearly flu (influenza) shot. You should also get these vaccines if you have chronic lung disease like asthma, emphysema, or COPD. Recently, a second type of pneumonia vaccine has become available for everyone over 65 years old. This is in addition to the previous vaccine. Ask your provider about this.  When to seek medical advice  Call your healthcare provider right away if any of these occur:    You don t get better within the first 48 hours of treatment    Shortness of breath gets worse    Rapid breathing (more than 25 breaths per minute)    Coughing up blood    Chest pain gets worse with breathing    Fever of 100.4 F (38 C) or higher that doesn t get better with fever medicine    Weakness, dizziness, or fainting that gets worse    Thirst or dry mouth that gets worse    Sinus pain, headache, or a stiff neck    Chest pain not caused by coughing  Date Last Reviewed: 1/1/2017 2000-2017 [employer]. [address] [place] [address]. All rights reserved. This information is not intended as a substitute for professional medical care. Always follow your healthcare professional's instructions.        Getting Support for Quitting Smoking  You don t have to go through the process of quitting smoking without support. Tell people you are quitting. The support of friends, coworkers, and family members can make a big difference. Face-to-face or telephone counseling can also be helpful, as can a stop-smoking class or an ex-smokers  group.    Set a quit date  If you re serious about quitting smoking, choose a date within the next 2 to 4 weeks. Bartolo it in bright, bold letters on a calendar you use often. Tell people about your quit date. Ask for their support. Let your friends and family know how they can help you quit.  Make a contract  A quit-smoking contract gives you a goal. Write out the contract and sign  it. Have it witnessed, if you like. Then keep the contract where you ll see it often, or carry it with you. Read the contract when you re tempted to smoke.  Take action  On the day you quit, reread your quit contract. Think about the benefits you gain by quitting, such as better health and an improved sense of taste.    Remove cigarettes from your home, car, or any other place where you stash them.    Throw away all smoking materials, including matches, lighters, and ashtrays.    Review your list of triggers and your plan for coping with them.    Stay away from people or settings you link with smoking.    Make a survival kit that includes gum, mints, carrot sticks, and things to keep your hands busy.    Talk to your healthcare provider about using quit-smoking products, such as medication or a nicotine patch, inhaler, nasal spray, gum, or lozenges.  Ask for help  Sometimes you may just need to talk when you miss smoking. Ex-smokers are good to talk to, because they re likely to know how you feel. You may need extra support in the first few weeks after you quit. Ask a friend to call you each day to see how you re doing. Telephone counseling can also help you keep on track. Ask your healthcare provider, local hospital, or public health department to put you in touch with a phone counselor. You may also have to deal with doubters when you decide to quit. Explain to any doubters why you are quitting. Tell them that quitting is important to you. Ask for their support. Tell your smoking buddies that you can walk together instead of smoking together. If someone thinks you won t succeed, say that you have a good quit plan and ask for their support. Let him or her know you re sticking with it.     For more information    https://smokefree.gov/zden-ms-ve-expert    National Cancer [place] Smoking Quitline: 877-44U-QUIT (929-409-3723)   Date Last Reviewed: 2/1/2017 2000-2017 [employer]. [address], [place] [address]. All rights reserved. This information is not intended as a substitute for professional medical care. Always follow your healthcare professional's instructions.          24 Hour Appointment Hotline       To make an appointment at any [facility], call [phone] ([phone]). If you don't have a family doctor or clinic, we will help you find one. [facility] clinics are conveniently located to serve the needs of you and your family.             Review of your medicines      START taking        Dose / Directions Last dose taken    levofloxacin 750 MG tablet   Commonly known as:  LEVAQUIN   Dose:  750 mg   Quantity:  5 tablet        Take 1 tablet (750 mg) by mouth daily   Refills:  0          Our records show that you are taking the medicines listed below. If these are incorrect, please call your family doctor or clinic.        Dose / Directions Last dose taken    albuterol 108 (90 BASE) MCG/ACT Inhaler   Commonly known as:  PROAIR HFA   Dose:  2 puff   Quantity:  1 Inhaler        Inhale 2 puffs into the lungs every 4 hours as needed for shortness of breath / dyspnea   Refills:  0        amoxicillin-clavulanate 875-125 MG per tablet   Commonly known as:  AUGMENTIN   Dose:  1 tablet   Quantity:  20 tablet        Take 1 tablet by mouth 2 times daily   Refills:  0        guaiFENesin-codeine 100-10 MG/5ML Soln solution   Commonly known as:  ROBITUSSIN AC   Dose:  1 tsp.   Quantity:  120 mL        Take 5 mLs by mouth every 4 hours as needed for cough   Refills:  0                Prescriptions were sent or printed at these locations (1 Prescription)                   Other Prescriptions                Printed at Department/Unit printer (1 of 1)         levofloxacin (LEVAQUIN) 750 MG tablet                Procedures and tests performed during your visit     Influenza A/B antigen    Peak flow, pre and post neb tx    XR Chest 2 Views      Orders Needing Specimen Collection     None      Pending  Results     Date and Time Order Name Status Description    1/24/2018 1747 XR Chest 2 Views Preliminary             Pending Culture Results     No orders found from 1/22/2018 to 1/25/2018.            Pending Results Instructions     If you had any lab results that were not finalized at the time of your Discharge, you can call the ED Lab Result RN at [phone]. You will be contacted by this team for any positive Lab results or changes in treatment. The nurses are available 7 days a week from 10A to 6:30P.  You can leave a message 24 hours per day and they will return your call.        Test Results From Your Hospital Stay        1/24/2018  5:43 PM      Component Results     Component Value Ref Range & Units Status    Influenza A/B Agn Specimen Nasal  Final    Influenza A Negative NEG^Negative Final    Influenza B Negative NEG^Negative Final    Test results must be correlated with clinical data. If necessary, results   should be confirmed by a molecular assay or viral culture.           1/24/2018  6:20 PM      Narrative     CHEST TWO VIEW  1/24/2018 6:15 PM      HISTORY: Cough, dyspnea.      COMPARISON: None.    FINDINGS: The heart is at the upper limits normal in size. There is  pulmonary vascular congestion. There is infiltrate in the right lung  base laterally. No other focal lung consolidation. No pneumothorax or  pleural effusion. Degenerative disease in the spine.        Impression     IMPRESSION:  1. Right basilar pneumonia.  2. Pulmonary vascular congestion.                Clinical Quality Measure: Blood Pressure Screening     Your blood pressure was checked while you were in the emergency department today. The last reading we obtained was  BP: 148/75 . Please read the guidelines below about what these numbers mean and what you should do about them.  If your systolic blood pressure (the top number) is less than 120 and your diastolic blood pressure (the bottom number) is less than 80, then your blood pressure  "is normal. There is nothing more that you need to do about it.  If your systolic blood pressure (the top number) is 120-139 or your diastolic blood pressure (the bottom number) is 80-89, your blood pressure may be higher than it should be. You should have your blood pressure rechecked within a year by a primary care provider.  If your systolic blood pressure (the top number) is 140 or greater or your diastolic blood pressure (the bottom number) is 90 or greater, you may have high blood pressure. High blood pressure is treatable, but if left untreated over time it can put you at risk for heart attack, stroke, or kidney failure. You should have your blood pressure rechecked by a primary care provider within the next 4 weeks.  If your provider in the emergency department today gave you specific instructions to follow-up with your doctor or provider even sooner than that, you should follow that instruction and not wait for up to 4 weeks for your follow-up visit.        Thank you for choosing [facility]       Thank you for choosing [facility] for your care. Our goal is always to provide you with excellent care. Hearing back from our patients is one way we can continue to improve our services. Please take a few minutes to complete the written survey that you may receive in the mail after you visit with us. Thank you!        [facility] Information     [employer] lets you send messages to your doctor, view your test results, renew your prescriptions, schedule appointments and more. To sign up, go to [email or web address] . Click on \"Log in\" on the left side of the screen, which will take you to the Welcome page. Then click on \"Sign up Now\" on the right side of the page.     You will be asked to enter the access code listed below, as well as some personal information. Please follow the directions to create your username and password.     Your access code is: [email or web address]  Expires: 2018  7:35 PM     Your access code will  " in 90 days. If you need help or a new code, please call your [facility] clinic or [phone].        Care EveryWhere ID     This is your Care EveryWhere ID. This could be used by other organizations to access your [facility] medical records  [id number]        Equal Access to Services     PREETI SCHWARTZ : Brianda Macedo, waaxda luqadaha, qaybta kaalmaalexus cisneros, caitlyn nava. So [facility] [phone].    ATENCIÓN: Si habla español, tiene a jones disposición servicios gratuitos de asistencia lingüística. Llame al [phone].    We comply with applicable federal civil rights laws and [place] laws. We do not discriminate on the basis of race, color, national origin, age, disability, sex, sexual orientation, or gender identity.            After Visit Summary       This is your record. Keep this with you and show to your community pharmacist(s) and doctor(s) at your next visit.

## 2018-01-24 NOTE — ED NOTES
Pt has difficulty breathing with shortness of breath for past 3 days and getting worse.  She had fever at home to 101.4 along with general body aches.

## 2018-01-25 NOTE — DISCHARGE INSTRUCTIONS
Your lung exam today was significantly abnormal. More than I would expect for just having pneumonia.  It is likely that your smoking is having damaging effects to your lungs. If you have lung disease it makes it much easier for you to get pneumonia and harder to recover from it. It will be very important for you to follow up with the [facility] and your Primary Care physician. If lung disease is treated early it can prevent many serious problems including early death and lung cancer. Consider stopping smoking, this is THE SINGLE MOST IMPORTANT THING TO HELP YOUR LUNGS. Your primary care doctor as well as the resources on www.smokefree.gov can help you with this process.    Use the inhaler 2 puffs with the spacer every 4 hours as needed for shortness of breath or cough.    Pneumonia (Adult)  Pneumonia is an infection deep within the lungs. It is in the small air sacs (alveoli). Pneumonia may be caused by a virus or bacteria. Pneumonia caused by bacteria is usually treated with an antibiotic. Severe cases may need to be treated in the hospital. Milder cases can be treated at home. Symptoms usually start to get better during the first 2 days of treatment.    Home care  Follow these guidelines when caring for yourself at home:    Rest at home for the first 2 to 3 days, or until you feel stronger. Don t let yourself get overly tired when you go back to your activities.    Stay away from cigarette smoke - yours or other people s.    You may use acetaminophen or ibuprofen to control fever or pain, unless another medicine was prescribed. If you have chronic liver or kidney disease, talk with your healthcare provider before using these medicines. Also talk with your provider if you ve had a stomach ulcer or gastrointestinal bleeding. Don t give aspirin to anyone younger than 18 years of age who is ill with a fever. It may cause severe liver damage.    Your appetite may be poor, so a light diet is fine.    Drink  6 to 8 glasses of fluids every day to make sure you are getting enough fluids. Beverages can include water, sport drinks, sodas without caffeine, juices, tea, or soup. Fluids will help loosen secretions in the lung. This will make it easier for you to cough up the phlegm (sputum). If you also have heart or kidney disease, check with your healthcare provider before you drink extra fluids.    Take antibiotic medicine prescribed until it is all gone, even if you are feeling better after a few days.  Follow-up care  Follow up with your healthcare provider in the next 2 to 3 days, or as advised. This is to be sure the medicine is helping you get better.  If you are 65 or older, you should get a pneumococcal vaccine and a yearly flu (influenza) shot. You should also get these vaccines if you have chronic lung disease like asthma, emphysema, or COPD. Recently, a second type of pneumonia vaccine has become available for everyone over 65 years old. This is in addition to the previous vaccine. Ask your provider about this.  When to seek medical advice  Call your healthcare provider right away if any of these occur:    You don t get better within the first 48 hours of treatment    Shortness of breath gets worse    Rapid breathing (more than 25 breaths per minute)    Coughing up blood    Chest pain gets worse with breathing    Fever of 100.4 F (38 C) or higher that doesn t get better with fever medicine    Weakness, dizziness, or fainting that gets worse    Thirst or dry mouth that gets worse    Sinus pain, headache, or a stiff neck    Chest pain not caused by coughing  Date Last Reviewed: 1/1/2017 2000-2017 [employer]. [address] [place] [address], [place] [address]. All rights reserved. This information is not intended as a substitute for professional medical care. Always follow your healthcare professional's instructions.        Getting Support for Quitting Smoking  You don t have to go through the process of  quitting smoking without support. Tell people you are quitting. The support of friends, coworkers, and family members can make a big difference. Face-to-face or telephone counseling can also be helpful, as can a stop-smoking class or an ex-smokers  group.    Set a quit date  If you re serious about quitting smoking, choose a date within the next 2 to 4 weeks. Bartolo it in bright, bold letters on a calendar you use often. Tell people about your quit date. Ask for their support. Let your friends and family know how they can help you quit.  Make a contract  A quit-smoking contract gives you a goal. Write out the contract and sign it. Have it witnessed, if you like. Then keep the contract where you ll see it often, or carry it with you. Read the contract when you re tempted to smoke.  Take action  On the day you quit, reread your quit contract. Think about the benefits you gain by quitting, such as better health and an improved sense of taste.    Remove cigarettes from your home, car, or any other place where you stash them.    Throw away all smoking materials, including matches, lighters, and ashtrays.    Review your list of triggers and your plan for coping with them.    Stay away from people or settings you link with smoking.    Make a survival kit that includes gum, mints, carrot sticks, and things to keep your hands busy.    Talk to your healthcare provider about using quit-smoking products, such as medication or a nicotine patch, inhaler, nasal spray, gum, or lozenges.  Ask for help  Sometimes you may just need to talk when you miss smoking. Ex-smokers are good to talk to, because they re likely to know how you feel. You may need extra support in the first few weeks after you quit. Ask a friend to call you each day to see how you re doing. Telephone counseling can also help you keep on track. Ask your healthcare provider, local hospital, or public health department to put you in touch with a phone counselor. You may  also have to deal with doubters when you decide to quit. Explain to any doubters why you are quitting. Tell them that quitting is important to you. Ask for their support. Tell your smoking buddies that you can walk together instead of smoking together. If someone thinks you won t succeed, say that you have a good quit plan and ask for their support. Let him or her know you re sticking with it.     For more information    https://smokefree.gov/cwtv-zd-au-expert    National Cancer [place] Smoking Quitline: 877-44U-QUIT (414-446-9056)   Date Last Reviewed: 2/1/2017 2000-2017 [employer]. [address] [place] [address], [place] [address]. All rights reserved. This information is not intended as a substitute for professional medical care. Always follow your healthcare professional's instructions.

## 2021-01-21 ENCOUNTER — HOSPITAL ENCOUNTER (EMERGENCY)
Facility: CLINIC | Age: 51
Discharge: HOME | End: 2021-01-21
Attending: EMERGENCY MEDICINE | Admitting: EMERGENCY MEDICINE
Payer: COMMERCIAL

## 2021-01-21 VITALS
WEIGHT: 280 LBS | SYSTOLIC BLOOD PRESSURE: 164 MMHG | DIASTOLIC BLOOD PRESSURE: 83 MMHG | HEART RATE: 69 BPM | TEMPERATURE: 99.1 F | RESPIRATION RATE: 11 BRPM | OXYGEN SATURATION: 95 % | BODY MASS INDEX: 51.21 KG/M2

## 2021-01-21 DIAGNOSIS — I10 SEVERE HYPERTENSION: ICD-10-CM

## 2021-01-21 LAB
ANION GAP SERPL CALCULATED.3IONS-SCNC: 3 MMOL/L (ref 3–14)
BASOPHILS # BLD AUTO: 0.1 10E9/L (ref 0–0.2)
BASOPHILS NFR BLD AUTO: 0.8 %
BUN SERPL-MCNC: 16 MG/DL (ref 7–30)
CALCIUM SERPL-MCNC: 8.7 MG/DL (ref 8.5–10.1)
CHLORIDE SERPL-SCNC: 109 MMOL/L (ref 94–109)
CO2 SERPL-SCNC: 29 MMOL/L (ref 20–32)
CREAT SERPL-MCNC: 1.01 MG/DL (ref 0.52–1.04)
DIFFERENTIAL METHOD BLD: NORMAL
EOSINOPHIL # BLD AUTO: 0.4 10E9/L (ref 0–0.7)
EOSINOPHIL NFR BLD AUTO: 5 %
ERYTHROCYTE [DISTWIDTH] IN BLOOD BY AUTOMATED COUNT: 13.5 % (ref 10–15)
GFR SERPL CREATININE-BSD FRML MDRD: 65 ML/MIN/{1.73_M2}
GLUCOSE SERPL-MCNC: 84 MG/DL (ref 70–99)
HCT VFR BLD AUTO: 44.5 % (ref 35–47)
HGB BLD-MCNC: 14.1 G/DL (ref 11.7–15.7)
IMM GRANULOCYTES # BLD: 0 10E9/L (ref 0–0.4)
IMM GRANULOCYTES NFR BLD: 0.5 %
LYMPHOCYTES # BLD AUTO: 1.7 10E9/L (ref 0.8–5.3)
LYMPHOCYTES NFR BLD AUTO: 19.3 %
MCH RBC QN AUTO: 29.3 PG (ref 26.5–33)
MCHC RBC AUTO-ENTMCNC: 31.7 G/DL (ref 31.5–36.5)
MCV RBC AUTO: 92 FL (ref 78–100)
MONOCYTES # BLD AUTO: 0.7 10E9/L (ref 0–1.3)
MONOCYTES NFR BLD AUTO: 7.6 %
NEUTROPHILS # BLD AUTO: 5.9 10E9/L (ref 1.6–8.3)
NEUTROPHILS NFR BLD AUTO: 66.8 %
NRBC # BLD AUTO: 0 10*3/UL
NRBC BLD AUTO-RTO: 0 /100
PLATELET # BLD AUTO: 214 10E9/L (ref 150–450)
POTASSIUM SERPL-SCNC: 3.6 MMOL/L (ref 3.4–5.3)
RBC # BLD AUTO: 4.82 10E12/L (ref 3.8–5.2)
SODIUM SERPL-SCNC: 141 MMOL/L (ref 133–144)
WBC # BLD AUTO: 8.8 10E9/L (ref 4–11)

## 2021-01-21 PROCEDURE — 80048 BASIC METABOLIC PNL TOTAL CA: CPT | Performed by: EMERGENCY MEDICINE

## 2021-01-21 PROCEDURE — 85025 COMPLETE CBC W/AUTO DIFF WBC: CPT | Performed by: EMERGENCY MEDICINE

## 2021-01-21 PROCEDURE — 93005 ELECTROCARDIOGRAM TRACING: CPT

## 2021-01-21 PROCEDURE — 99284 EMERGENCY DEPT VISIT MOD MDM: CPT | Mod: 25

## 2021-01-21 PROCEDURE — 250N000011 HC RX IP 250 OP 636: Performed by: EMERGENCY MEDICINE

## 2021-01-21 PROCEDURE — 36415 COLL VENOUS BLD VENIPUNCTURE: CPT | Performed by: EMERGENCY MEDICINE

## 2021-01-21 PROCEDURE — 250N000013 HC RX MED GY IP 250 OP 250 PS 637: Performed by: EMERGENCY MEDICINE

## 2021-01-21 PROCEDURE — 96374 THER/PROPH/DIAG INJ IV PUSH: CPT

## 2021-01-21 RX ORDER — AMLODIPINE BESYLATE 5 MG/1
5 TABLET ORAL DAILY
Qty: 30 TABLET | Refills: 0 | Status: ON HOLD | OUTPATIENT
Start: 2021-01-21 | End: 2024-02-21

## 2021-01-21 RX ORDER — LABETALOL HYDROCHLORIDE 5 MG/ML
20 INJECTION, SOLUTION INTRAVENOUS EVERY 10 MIN PRN
Status: DISCONTINUED | OUTPATIENT
Start: 2021-01-21 | End: 2021-01-22 | Stop reason: HOSPADM

## 2021-01-21 RX ORDER — LISINOPRIL 10 MG/1
10 TABLET ORAL DAILY
Qty: 30 TABLET | Refills: 0 | Status: ON HOLD | OUTPATIENT
Start: 2021-01-21 | End: 2024-02-21

## 2021-01-21 RX ORDER — LISINOPRIL 10 MG/1
10 TABLET ORAL DAILY
Qty: 30 TABLET | Refills: 0 | Status: SHIPPED | OUTPATIENT
Start: 2021-01-21 | End: 2021-01-21

## 2021-01-21 RX ORDER — AMLODIPINE BESYLATE 5 MG/1
5 TABLET ORAL DAILY
Qty: 30 TABLET | Refills: 0 | Status: SHIPPED | OUTPATIENT
Start: 2021-01-21 | End: 2021-01-21

## 2021-01-21 RX ORDER — AMLODIPINE BESYLATE 5 MG/1
5 TABLET ORAL ONCE
Status: COMPLETED | OUTPATIENT
Start: 2021-01-21 | End: 2021-01-21

## 2021-01-21 RX ORDER — LISINOPRIL 10 MG/1
10 TABLET ORAL ONCE
Status: COMPLETED | OUTPATIENT
Start: 2021-01-21 | End: 2021-01-21

## 2021-01-21 RX ADMIN — LISINOPRIL 10 MG: 10 TABLET ORAL at 20:24

## 2021-01-21 RX ADMIN — LABETALOL HYDROCHLORIDE 20 MG: 5 INJECTION, SOLUTION INTRAVENOUS at 21:15

## 2021-01-21 RX ADMIN — AMLODIPINE BESYLATE 5 MG: 5 TABLET ORAL at 20:24

## 2021-01-21 ASSESSMENT — ENCOUNTER SYMPTOMS
COUGH: 0
FEVER: 0
SHORTNESS OF BREATH: 1
NEUROLOGICAL NEGATIVE: 1

## 2021-01-22 LAB — INTERPRETATION ECG - MUSE: NORMAL

## 2021-01-22 NOTE — ED TRIAGE NOTES
Pt arrives from clinic where she was being seen for sinus congestion, they found her BP to be high and she was sent here for evaluation. Pt would like both her sinuses and blood pressure worked up here. Pt denies headache and chest pain, but reports nasal congestion and SOB with exertion for a few days. Pt has been told she has HTN before, but takes no medications. ABCs intact, A/O x4.

## 2021-01-22 NOTE — ED PROVIDER NOTES
History     Chief Complaint:  Hypertension and Sinusitis       HPI  Shilpa Miramontes is a 50 year old female with a history of hypertension who presents for evaluation of hypertension.  The patient states that she has had a cold with congestion for the past 2.5 weeks so she presented to Urgent Care for sinusitis when her blood pressure was found high so she was sent over to the ED.  Here she also notes that she is experiencing shortness of breath secondary to the congestion.  She denies any cough and fever.    Of note, she has a history of high blood pressure and was prescribed Hydrodiuril in 2010 but does not remember ever taking it.  In her previous ED visits she has also been hypertensive.      Allergies:  Morphine  Fluoxetine  Percocet [Oxycodone-Acetaminophen]    Medications:   Albuterol 108  Augmentin  Flonase  Levaquin     Medical History:   Lung disease  Hypertension  Tobacco use disorder    Surgical History   Cholecystectomy    Social History:  Patient presents to ED alone.  Patient has a history of smoking.  PCP: No Ref-Primary, Physician      Review of Systems   Constitutional: Negative for fever.   HENT: Positive for congestion.    Respiratory: Positive for shortness of breath. Negative for cough.    Cardiovascular: Negative for chest pain.        Hypertension   Neurological: Negative.    All other systems reviewed and are negative.    Physical Exam     Patient Vitals for the past 24 hrs:   BP Temp Temp src Pulse Resp SpO2 Weight   01/21/21 2120 (!) 197/94 -- -- 71 20 97 % --   01/21/21 2115 (!) 180/90 -- -- 74 28 98 % --   01/21/21 2045 (!) 201/103 -- -- 73 29 97 % --   01/21/21 2030 (!) 197/87 -- -- 76 26 96 % --   01/21/21 2020 (!) 208/106 -- -- 79 17 99 % --   01/21/21 2000 (!) 203/112 -- -- 79 -- 97 % --   01/21/21 1955 (!) 239/128 -- -- 84 -- 97 % --   01/21/21 1935 (!) 237/120 99.1  F (37.3  C) Oral 82 20 99 % 127 kg (280 lb)        Physical Exam     General: Patient is alert and cooperative.   Overweight.   HENT:  Normal nose, oropharynx. Moist oral mucosa.  Eyes: EOMI. Normal conjunctiva.  Neck:  Normal range of motion and appearance.   Cardiovascular:  Normal rate, regular rhythm and normal heart sounds.   Pulmonary/Chest:  Effort normal. No wheezing or crackles.  Abdominal: Soft. No distension or tenderness.     Musculoskeletal: Normal range of motion. No edema or tenderness.   Neurological: oriented, normal strength, sensation, and coordination.   Skin: Warm and dry. No rash or bruising.   Psychiatric: Normal mood and affect. Normal behavior and judgement.      Emergency Department Course     ECG:  ECG taken at , ECG read at   Normal sinus rhythm  normal ECG  Rate 82 bpm. NH interval 122 ms. QRS duration 86 ms. QT/QTc 372/434 ms. P-R-T axes 47 66 66.     Laboratory:    CBC: WBC: 8.8, HGB: 14.1, PLT: 214  BMP: Glucose 84,o/w WNL (Creatinine: 1.01)      Emergency Department Course:     Reviewed:  I reviewed the patient's nursing notes, vitals, past medical records, Care Everywhere.      Assessments:   I preformed my initial assessment of the patient.     Patient rechecked and updated.      Interventions:   Norvasc 5 mg PO   Zestril 10 mg PO   Normodyne 20 mg IV injection      Disposition:  Discharged to home.       Impression & Plan     Medical Decision Makin-year-old female referred from outside urgent care for further evaluation of severe hypertension.  She does have what appears to be a fairly longstanding history of untreated hypertension.  She presented to urgent care with complaints of some URI symptoms and sinus congestion.  She was prescribed Augmentin and Flonase and referred here for further management.  She has had no associated chest pain, headache, or neurologic symptoms.  Initial blood pressure on arrival 237/120.  She is overweight but has essentially an unremarkable physical examination.  Her testing has included an EKG depicting a sinus rhythm with no  ischemic changes.  Screening labs show normal renal function.  She was medicated with amlodipine 5 mg p.o., lisinopril 10 mg p.o., and labetalol 20 mg IV.  Her blood pressure did trend downward to the 180s over 90s range.  Clinical impression is severe asymptomatic hypertension, almost certainly longstanding.  I explained to her that she likely will at the least require dual therapy and will need to follow-up in a primary clinic setting for blood pressure recheck medication titration and further management.  She was prescribed amlodipine and lisinopril and given information on hypertension.      Diagnosis:     ICD-10-CM    1. Severe hypertension  I10         Disposition:  Discharged to home.    Discharge Medications:  Current Discharge Medication List      START taking these medications    Details   amLODIPine (NORVASC) 5 MG tablet Take 1 tablet (5 mg) by mouth daily  Qty: 30 tablet, Refills: 0      lisinopril (ZESTRIL) 10 MG tablet Take 1 tablet (10 mg) by mouth daily  Qty: 30 tablet, Refills: 0             Scribe Disclosure:  I, Chiara Cade, am serving as a scribe at 7:42 PM on 1/21/2021 to document services personally performed by Claude Kirk MD based on my observations and the provider's statements to me.     Valdese Claude Gilbert MD  01/21/21 1686

## 2023-11-27 ENCOUNTER — HOSPITAL ENCOUNTER (EMERGENCY)
Facility: CLINIC | Age: 53
Discharge: HOME | End: 2023-11-27
Attending: EMERGENCY MEDICINE | Admitting: EMERGENCY MEDICINE
Payer: COMMERCIAL

## 2023-11-27 ENCOUNTER — APPOINTMENT (OUTPATIENT)
Dept: GENERAL RADIOLOGY | Facility: CLINIC | Age: 53
End: 2023-11-27
Attending: EMERGENCY MEDICINE
Payer: COMMERCIAL

## 2023-11-27 VITALS
BODY MASS INDEX: 55.32 KG/M2 | DIASTOLIC BLOOD PRESSURE: 84 MMHG | RESPIRATION RATE: 18 BRPM | HEIGHT: 61 IN | OXYGEN SATURATION: 91 % | TEMPERATURE: 98.2 F | HEART RATE: 79 BPM | SYSTOLIC BLOOD PRESSURE: 135 MMHG | WEIGHT: 293 LBS

## 2023-11-27 DIAGNOSIS — J20.5 ACUTE BRONCHITIS DUE TO RESPIRATORY SYNCYTIAL VIRUS (RSV): ICD-10-CM

## 2023-11-27 DIAGNOSIS — J98.01 BRONCHOSPASM: ICD-10-CM

## 2023-11-27 LAB
FLUAV RNA SPEC QL NAA+PROBE: NEGATIVE
FLUBV RNA RESP QL NAA+PROBE: NEGATIVE
RSV RNA SPEC NAA+PROBE: POSITIVE
SARS-COV-2 RNA RESP QL NAA+PROBE: NEGATIVE

## 2023-11-27 PROCEDURE — 87637 SARSCOV2&INF A&B&RSV AMP PRB: CPT | Performed by: EMERGENCY MEDICINE

## 2023-11-27 PROCEDURE — 250N000012 HC RX MED GY IP 250 OP 636 PS 637: Performed by: EMERGENCY MEDICINE

## 2023-11-27 PROCEDURE — 99284 EMERGENCY DEPT VISIT MOD MDM: CPT | Mod: 25

## 2023-11-27 PROCEDURE — 94640 AIRWAY INHALATION TREATMENT: CPT

## 2023-11-27 PROCEDURE — 250N000009 HC RX 250: Performed by: EMERGENCY MEDICINE

## 2023-11-27 PROCEDURE — 71046 X-RAY EXAM CHEST 2 VIEWS: CPT

## 2023-11-27 RX ORDER — PREDNISONE 50 MG/1
TABLET ORAL
Qty: 4 TABLET | Refills: 0 | Status: SHIPPED | OUTPATIENT
Start: 2023-11-28 | End: 2024-02-14

## 2023-11-27 RX ORDER — PREDNISONE 20 MG/1
40 TABLET ORAL ONCE
Status: COMPLETED | OUTPATIENT
Start: 2023-11-27 | End: 2023-11-27

## 2023-11-27 RX ORDER — ALBUTEROL SULFATE 90 UG/1
2 AEROSOL, METERED RESPIRATORY (INHALATION) EVERY 6 HOURS PRN
Qty: 18 G | Refills: 0 | Status: ON HOLD | OUTPATIENT
Start: 2023-11-27 | End: 2024-05-15

## 2023-11-27 RX ORDER — IPRATROPIUM BROMIDE AND ALBUTEROL SULFATE 2.5; .5 MG/3ML; MG/3ML
3 SOLUTION RESPIRATORY (INHALATION) ONCE
Status: COMPLETED | OUTPATIENT
Start: 2023-11-27 | End: 2023-11-27

## 2023-11-27 RX ADMIN — PREDNISONE 40 MG: 20 TABLET ORAL at 15:15

## 2023-11-27 RX ADMIN — IPRATROPIUM BROMIDE AND ALBUTEROL SULFATE 3 ML: .5; 3 SOLUTION RESPIRATORY (INHALATION) at 15:15

## 2023-11-27 ASSESSMENT — ACTIVITIES OF DAILY LIVING (ADL)
ADLS_ACUITY_SCORE: 35
ADLS_ACUITY_SCORE: 35

## 2023-11-27 NOTE — ED PROVIDER NOTES
"    History     Chief Complaint:  URI       The history is provided by the patient.      Shilpa Miramontes is a 53 year old female with hypertension who presents with a cough.  She describes about 4 days of cough and wheezing.  She has an albuterol inhaler she has been prescribed when she has similar illnesses in the past which has provided some relief.  She has never been diagnosed with asthma or COPD.  She quit smoking about 1 year ago.  She has not had fever but does feel alternating hot and cold at night and had some headaches leading up to her cough.  She denies sore throat or chest pain, but does have a heavy sensation in her lungs.    Independent Historian:    As above    Review of External Notes:  Primary care visit June 2023.  Available records from urgent care visit today.    Medications:    albuterol (ALBUTEROL) 108 (90 BASE) MCG/ACT Inhaler  amLODIPine (NORVASC) 5 MG tablet  lisinopril (ZESTRIL) 10 MG tablet  Lipitor    Past Medical History:    Hypertension  Denies history of diabetes    Past Surgical History:    Past Surgical History:   Procedure Laterality Date    CHOLECYSTECTOMY  2004       Physical Exam   Patient Vitals for the past 24 hrs:   BP Temp Pulse Resp SpO2 Height Weight   11/27/23 1434 -- -- 82 -- 93 % -- --   11/27/23 1121 (!) 153/84 -- -- -- -- -- --   11/27/23 1120 -- 98.2  F (36.8  C) 83 18 94 % 1.549 m (5' 1\") 135.2 kg (298 lb)        Physical Exam  General: Well-developed and obese. Well appearing middle aged  woman. Cooperative.  Head:  Atraumatic.  Eyes:  Conjunctivae, lids, and sclerae are normal.  ENT:    Wearing a facemask.  Neck:  Supple. Normal range of motion.  CV:  Regular rate and rhythm. Normal heart sounds with no murmurs, rubs, or gallops detected.  Resp:  No respiratory distress.  Expiratory wheezing in all lung fields without tachypnea or increased respiratory effort.  No rales.   GI:  Non-distended.     MS:  Normal ROM.   Skin:  Warm. Non-diaphoretic. No " pallor.  Neuro:  Awake. A&Ox3. Normal strength.  Psych: Normal mood and affect. Normal speech.  Vitals reviewed.  Emergency Department Course   Imaging:  XR Chest 2 Views   Final Result   IMPRESSION: Heart size upper limits of normal with mild pulmonary   venous congestion, unchanged. The lungs are clear. No pleural   effusions.      HAMILTON GAMBOA MD            SYSTEM ID:  [id number]        Laboratory:  Labs Ordered and Resulted from Time of ED Arrival to Time of ED Departure   INFLUENZA A/B, RSV, & SARS-COV2 PCR - Abnormal       Result Value    Influenza A PCR Negative      Influenza B PCR Negative      RSV PCR Positive (*)     SARS CoV2 PCR Negative        Emergency Department Course & Assessments:  Interventions:  Medications   ipratropium - albuterol 0.5 mg/2.5 mg/3 mL (DUONEB) neb solution 3 mL (3 mLs Nebulization $Given 11/27/23 1515)   predniSONE (DELTASONE) tablet 40 mg (40 mg Oral $Given 11/27/23 1515)      Assessments:  1715 I rechecked the patient.  She is feeling improved.  Lung sounds improved.    Independent Interpretation (X-rays, CTs, rhythm strip):  I independently interpreted the chest x-ray and agree with radiologist.  No focal pneumonia.    Consultations/Discussion of Management or Tests:  Not applicable    Social Determinants of Health affecting care:  Primary care provider     Disposition:  Discharged.    Impression & Plan    Medical Decision Making:  Shilpa is a 53 year old woman who has had 4 days of cough with wheezing.  She has had wheezing with illnesses in the past so she tried her albuterol with some relief.  She is well-appearing on exam although she does have expiratory wheezing in all lung fields.  She is not hypoxic nor is she in any respiratory distress.    While influenza and COVID-19 testing are negative, RSV is positive and is almost certainly is the sole cause of her symptoms.  I strongly suspect her viral illness has resulted in bronchospasm and subsequent wheezing.  Chest  x-ray does not reveal pneumonia or other acute pathology.  Serum studies are unlikely to .  She was given a DuoNeb and prednisone and on repeat evaluation feels improved.  Her lung sounds are also improved.  As such, she is appropriate for discharge with supportive care for RSV as well as albuterol and a steroid burst for her bronchospasm.  We discussed over-the-counter cough and cold medications and the importance of following up with her primary care provider.  Indications for return were reviewed.  All questions answered.  Amenable to discharge.    Diagnosis:    ICD-10-CM    1. Acute bronchitis due to respiratory syncytial virus (RSV)  J20.5       2. Bronchospasm  J98.01            Discharge Medications:  Discharge Medication List as of 11/27/2023  5:23 PM        START taking these medications    Details   !! albuterol (VENTOLIN HFA) 108 (90 Base) MCG/ACT inhaler Inhale 2 puffs into the lungs every 6 hours as needed for shortness of breath, wheezing or cough, Disp-18 g, R-0, E-PrescribePharmacy may dispense brand covered by insurance (Proair, or proventil or ventolin or generic albuterol inhaler)      predniSONE (DELTASONE) 50 MG tablet Take 1 tablet by mouth daily for 4 days., Disp-4 tablet, R-0, E-Prescribe       !! - Potential duplicate medications found. Please discuss with provider.         11/27/2023   Flaquita Callaway MD Dixson, Kylie S, MD  12/03/23 [id number]

## 2023-11-27 NOTE — ED TRIAGE NOTES
Pt presnts for complaint of shortness of breath, and cough. Pt states symptoms for several days worsening over that period. Describes stomach upset, but denies vomiting. ABC Intact, A&Ox4.     Triage Assessment (Adult)       Row Name 11/27/23 1120          Triage Assessment    Airway WDL WDL        Respiratory WDL    Respiratory WDL rhythm/pattern     Rhythm/Pattern, Respiratory shortness of breath;tachypneic        Skin Circulation/Temperature WDL    Skin Circulation/Temperature WDL WDL        Cardiac WDL    Cardiac WDL WDL        Peripheral/Neurovascular WDL    Peripheral Neurovascular WDL WDL        Cognitive/Neuro/Behavioral WDL    Cognitive/Neuro/Behavioral WDL WDL

## 2023-11-27 NOTE — DISCHARGE INSTRUCTIONS
No need for antibiotics as this is an infection due to a virus.  Lots of rest and fluids.  Tylenol or ibuprofen as needed for fever or pain.  Steroids for wheezing with next dose tomorrow.  Albuterol as needed for wheezing or shortness of breath.  You can use over-the-counter cough and cold medicines including Delsym.  Follow-up with your primary care provider to ensure you are improving as expected.  Expect your cough to linger for weeks.  Return immediately if you have worsening symptoms or new concerns of any kind.  Try to isolate and cover your cough as you are contagious.

## 2024-02-14 ENCOUNTER — APPOINTMENT (OUTPATIENT)
Dept: GENERAL RADIOLOGY | Facility: CLINIC | Age: 54
End: 2024-02-14
Attending: STUDENT IN AN ORGANIZED HEALTH CARE EDUCATION/TRAINING PROGRAM
Payer: COMMERCIAL

## 2024-02-14 ENCOUNTER — HOSPITAL ENCOUNTER (INPATIENT)
Facility: CLINIC | Age: 54
LOS: 7 days | Discharge: HOME | End: 2024-02-21
Attending: EMERGENCY MEDICINE | Admitting: HOSPITALIST
Payer: COMMERCIAL

## 2024-02-14 DIAGNOSIS — R06.00 DYSPNEA, UNSPECIFIED TYPE: ICD-10-CM

## 2024-02-14 DIAGNOSIS — I51.3 THROMBUS OF LEFT ATRIAL APPENDAGE: Primary | ICD-10-CM

## 2024-02-14 DIAGNOSIS — R06.2 WHEEZING: ICD-10-CM

## 2024-02-14 DIAGNOSIS — I48.91 ATRIAL FIBRILLATION WITH RVR (H): ICD-10-CM

## 2024-02-14 DIAGNOSIS — G47.33 OSA (OBSTRUCTIVE SLEEP APNEA): ICD-10-CM

## 2024-02-14 DIAGNOSIS — I47.29 NON-SUSTAINED VENTRICULAR TACHYCARDIA (H): ICD-10-CM

## 2024-02-14 LAB
ALBUMIN SERPL BCG-MCNC: 4 G/DL (ref 3.5–5.2)
ALP SERPL-CCNC: 121 U/L (ref 40–150)
ALT SERPL W P-5'-P-CCNC: 72 U/L (ref 0–50)
ANION GAP SERPL CALCULATED.3IONS-SCNC: 13 MMOL/L (ref 7–15)
ANION GAP SERPL CALCULATED.3IONS-SCNC: 16 MMOL/L (ref 7–15)
AST SERPL W P-5'-P-CCNC: 33 U/L (ref 0–45)
BASOPHILS # BLD AUTO: 0.1 10E3/UL (ref 0–0.2)
BASOPHILS NFR BLD AUTO: 1 %
BILIRUB SERPL-MCNC: 0.3 MG/DL
BUN SERPL-MCNC: 15.9 MG/DL (ref 6–20)
BUN SERPL-MCNC: 17.9 MG/DL (ref 6–20)
CALCIUM SERPL-MCNC: 8.6 MG/DL (ref 8.6–10)
CALCIUM SERPL-MCNC: 8.8 MG/DL (ref 8.6–10)
CHLORIDE SERPL-SCNC: 105 MMOL/L (ref 98–107)
CHLORIDE SERPL-SCNC: 105 MMOL/L (ref 98–107)
CREAT SERPL-MCNC: 0.85 MG/DL (ref 0.51–0.95)
CREAT SERPL-MCNC: 0.87 MG/DL (ref 0.51–0.95)
DEPRECATED HCO3 PLAS-SCNC: 20 MMOL/L (ref 22–29)
DEPRECATED HCO3 PLAS-SCNC: 23 MMOL/L (ref 22–29)
EGFRCR SERPLBLD CKD-EPI 2021: 80 ML/MIN/1.73M2
EGFRCR SERPLBLD CKD-EPI 2021: 82 ML/MIN/1.73M2
EOSINOPHIL # BLD AUTO: 0.2 10E3/UL (ref 0–0.7)
EOSINOPHIL NFR BLD AUTO: 2 %
ERYTHROCYTE [DISTWIDTH] IN BLOOD BY AUTOMATED COUNT: 13.6 % (ref 10–15)
FLUAV RNA SPEC QL NAA+PROBE: NEGATIVE
FLUBV RNA RESP QL NAA+PROBE: NEGATIVE
GLUCOSE BLDC GLUCOMTR-MCNC: 149 MG/DL (ref 70–99)
GLUCOSE SERPL-MCNC: 162 MG/DL (ref 70–99)
GLUCOSE SERPL-MCNC: 99 MG/DL (ref 70–99)
HCT VFR BLD AUTO: 41.5 % (ref 35–47)
HGB BLD-MCNC: 13 G/DL (ref 11.7–15.7)
HOLD SPECIMEN: NORMAL
HOLD SPECIMEN: NORMAL
IMM GRANULOCYTES # BLD: 0 10E3/UL
IMM GRANULOCYTES NFR BLD: 0 %
LYMPHOCYTES # BLD AUTO: 1.6 10E3/UL (ref 0.8–5.3)
LYMPHOCYTES NFR BLD AUTO: 18 %
MAGNESIUM SERPL-MCNC: 2 MG/DL (ref 1.7–2.3)
MCH RBC QN AUTO: 29.3 PG (ref 26.5–33)
MCHC RBC AUTO-ENTMCNC: 31.3 G/DL (ref 31.5–36.5)
MCV RBC AUTO: 94 FL (ref 78–100)
MONOCYTES # BLD AUTO: 0.6 10E3/UL (ref 0–1.3)
MONOCYTES NFR BLD AUTO: 7 %
NEUTROPHILS # BLD AUTO: 6.5 10E3/UL (ref 1.6–8.3)
NEUTROPHILS NFR BLD AUTO: 72 %
NRBC # BLD AUTO: 0 10E3/UL
NRBC BLD AUTO-RTO: 0 /100
NT-PROBNP SERPL-MCNC: 1647 PG/ML (ref 0–900)
PLATELET # BLD AUTO: 279 10E3/UL (ref 150–450)
POTASSIUM SERPL-SCNC: 4.2 MMOL/L (ref 3.4–5.3)
POTASSIUM SERPL-SCNC: 4.3 MMOL/L (ref 3.4–5.3)
PROCALCITONIN SERPL IA-MCNC: 0.05 NG/ML
PROT SERPL-MCNC: 7.7 G/DL (ref 6.4–8.3)
RBC # BLD AUTO: 4.43 10E6/UL (ref 3.8–5.2)
RSV RNA SPEC NAA+PROBE: NEGATIVE
SARS-COV-2 RNA RESP QL NAA+PROBE: NEGATIVE
SODIUM SERPL-SCNC: 141 MMOL/L (ref 135–145)
SODIUM SERPL-SCNC: 141 MMOL/L (ref 135–145)
TROPONIN T SERPL HS-MCNC: 13 NG/L
TSH SERPL DL<=0.005 MIU/L-ACNC: 2.52 UIU/ML (ref 0.3–4.2)
WBC # BLD AUTO: 9 10E3/UL (ref 4–11)

## 2024-02-14 PROCEDURE — 36415 COLL VENOUS BLD VENIPUNCTURE: CPT | Performed by: STUDENT IN AN ORGANIZED HEALTH CARE EDUCATION/TRAINING PROGRAM

## 2024-02-14 PROCEDURE — 120N000001 HC R&B MED SURG/OB

## 2024-02-14 PROCEDURE — 258N000003 HC RX IP 258 OP 636: Performed by: NURSE PRACTITIONER

## 2024-02-14 PROCEDURE — 999N000157 HC STATISTIC RCP TIME EA 10 MIN

## 2024-02-14 PROCEDURE — 250N000009 HC RX 250: Performed by: STUDENT IN AN ORGANIZED HEALTH CARE EDUCATION/TRAINING PROGRAM

## 2024-02-14 PROCEDURE — 250N000009 HC RX 250: Performed by: NURSE PRACTITIONER

## 2024-02-14 PROCEDURE — 94660 CPAP INITIATION&MGMT: CPT

## 2024-02-14 PROCEDURE — 94640 AIRWAY INHALATION TREATMENT: CPT

## 2024-02-14 PROCEDURE — 87637 SARSCOV2&INF A&B&RSV AMP PRB: CPT | Performed by: STUDENT IN AN ORGANIZED HEALTH CARE EDUCATION/TRAINING PROGRAM

## 2024-02-14 PROCEDURE — 96374 THER/PROPH/DIAG INJ IV PUSH: CPT | Mod: 59

## 2024-02-14 PROCEDURE — 71045 X-RAY EXAM CHEST 1 VIEW: CPT

## 2024-02-14 PROCEDURE — 83735 ASSAY OF MAGNESIUM: CPT | Performed by: STUDENT IN AN ORGANIZED HEALTH CARE EDUCATION/TRAINING PROGRAM

## 2024-02-14 PROCEDURE — 99285 EMERGENCY DEPT VISIT HI MDM: CPT | Mod: 25

## 2024-02-14 PROCEDURE — 250N000013 HC RX MED GY IP 250 OP 250 PS 637: Performed by: NURSE PRACTITIONER

## 2024-02-14 PROCEDURE — 96376 TX/PRO/DX INJ SAME DRUG ADON: CPT

## 2024-02-14 PROCEDURE — 93005 ELECTROCARDIOGRAM TRACING: CPT

## 2024-02-14 PROCEDURE — 80053 COMPREHEN METABOLIC PANEL: CPT | Performed by: STUDENT IN AN ORGANIZED HEALTH CARE EDUCATION/TRAINING PROGRAM

## 2024-02-14 PROCEDURE — 84443 ASSAY THYROID STIM HORMONE: CPT | Performed by: STUDENT IN AN ORGANIZED HEALTH CARE EDUCATION/TRAINING PROGRAM

## 2024-02-14 PROCEDURE — 83880 ASSAY OF NATRIURETIC PEPTIDE: CPT | Performed by: STUDENT IN AN ORGANIZED HEALTH CARE EDUCATION/TRAINING PROGRAM

## 2024-02-14 PROCEDURE — 250N000011 HC RX IP 250 OP 636: Performed by: NURSE PRACTITIONER

## 2024-02-14 PROCEDURE — 36415 COLL VENOUS BLD VENIPUNCTURE: CPT | Performed by: NURSE PRACTITIONER

## 2024-02-14 PROCEDURE — 99223 1ST HOSP IP/OBS HIGH 75: CPT | Performed by: NURSE PRACTITIONER

## 2024-02-14 PROCEDURE — 80048 BASIC METABOLIC PNL TOTAL CA: CPT | Performed by: STUDENT IN AN ORGANIZED HEALTH CARE EDUCATION/TRAINING PROGRAM

## 2024-02-14 PROCEDURE — 85041 AUTOMATED RBC COUNT: CPT | Performed by: STUDENT IN AN ORGANIZED HEALTH CARE EDUCATION/TRAINING PROGRAM

## 2024-02-14 PROCEDURE — 250N000012 HC RX MED GY IP 250 OP 636 PS 637: Performed by: STUDENT IN AN ORGANIZED HEALTH CARE EDUCATION/TRAINING PROGRAM

## 2024-02-14 PROCEDURE — 84484 ASSAY OF TROPONIN QUANT: CPT | Performed by: NURSE PRACTITIONER

## 2024-02-14 PROCEDURE — 5A09357 ASSISTANCE WITH RESPIRATORY VENTILATION, LESS THAN 24 CONSECUTIVE HOURS, CONTINUOUS POSITIVE AIRWAY PRESSURE: ICD-10-PCS | Performed by: HOSPITALIST

## 2024-02-14 PROCEDURE — 96361 HYDRATE IV INFUSION ADD-ON: CPT

## 2024-02-14 PROCEDURE — 250N000011 HC RX IP 250 OP 636: Mod: JZ | Performed by: STUDENT IN AN ORGANIZED HEALTH CARE EDUCATION/TRAINING PROGRAM

## 2024-02-14 PROCEDURE — 84145 PROCALCITONIN (PCT): CPT | Performed by: NURSE PRACTITIONER

## 2024-02-14 PROCEDURE — 99291 CRITICAL CARE FIRST HOUR: CPT | Mod: 25

## 2024-02-14 PROCEDURE — 258N000003 HC RX IP 258 OP 636: Performed by: STUDENT IN AN ORGANIZED HEALTH CARE EDUCATION/TRAINING PROGRAM

## 2024-02-14 RX ORDER — HEPARIN SODIUM 5000 [USP'U]/.5ML
5000 INJECTION, SOLUTION INTRAVENOUS; SUBCUTANEOUS EVERY 8 HOURS
Status: DISCONTINUED | OUTPATIENT
Start: 2024-02-14 | End: 2024-02-15

## 2024-02-14 RX ORDER — IPRATROPIUM BROMIDE AND ALBUTEROL SULFATE 2.5; .5 MG/3ML; MG/3ML
3 SOLUTION RESPIRATORY (INHALATION) EVERY 4 HOURS PRN
Status: DISCONTINUED | OUTPATIENT
Start: 2024-02-14 | End: 2024-02-21 | Stop reason: HOSPADM

## 2024-02-14 RX ORDER — PREDNISONE 20 MG/1
40 TABLET ORAL ONCE
Status: COMPLETED | OUTPATIENT
Start: 2024-02-14 | End: 2024-02-14

## 2024-02-14 RX ORDER — LIDOCAINE 40 MG/G
CREAM TOPICAL
Status: DISCONTINUED | OUTPATIENT
Start: 2024-02-14 | End: 2024-02-21 | Stop reason: HOSPADM

## 2024-02-14 RX ORDER — AZITHROMYCIN 500 MG/5ML
500 INJECTION, POWDER, LYOPHILIZED, FOR SOLUTION INTRAVENOUS EVERY 24 HOURS
Status: DISCONTINUED | OUTPATIENT
Start: 2024-02-14 | End: 2024-02-15

## 2024-02-14 RX ORDER — AMOXICILLIN 250 MG
2 CAPSULE ORAL 2 TIMES DAILY PRN
Status: DISCONTINUED | OUTPATIENT
Start: 2024-02-14 | End: 2024-02-21 | Stop reason: HOSPADM

## 2024-02-14 RX ORDER — DIGOXIN 0.25 MG/ML
250 INJECTION INTRAMUSCULAR; INTRAVENOUS ONCE
Status: COMPLETED | OUTPATIENT
Start: 2024-02-14 | End: 2024-02-14

## 2024-02-14 RX ORDER — FUROSEMIDE 10 MG/ML
40 INJECTION INTRAMUSCULAR; INTRAVENOUS ONCE
Status: COMPLETED | OUTPATIENT
Start: 2024-02-14 | End: 2024-02-14

## 2024-02-14 RX ORDER — NITROGLYCERIN 0.4 MG/1
0.4 TABLET SUBLINGUAL EVERY 5 MIN PRN
Status: DISCONTINUED | OUTPATIENT
Start: 2024-02-14 | End: 2024-02-21 | Stop reason: HOSPADM

## 2024-02-14 RX ORDER — FLUTICASONE PROPIONATE 50 MCG
1 SPRAY, SUSPENSION (ML) NASAL DAILY PRN
COMMUNITY

## 2024-02-14 RX ORDER — AMOXICILLIN 250 MG
1 CAPSULE ORAL 2 TIMES DAILY PRN
Status: DISCONTINUED | OUTPATIENT
Start: 2024-02-14 | End: 2024-02-21 | Stop reason: HOSPADM

## 2024-02-14 RX ORDER — LISINOPRIL 10 MG/1
10 TABLET ORAL DAILY
Status: DISCONTINUED | OUTPATIENT
Start: 2024-02-15 | End: 2024-02-15

## 2024-02-14 RX ORDER — IPRATROPIUM BROMIDE AND ALBUTEROL SULFATE 2.5; .5 MG/3ML; MG/3ML
3 SOLUTION RESPIRATORY (INHALATION)
Status: COMPLETED | OUTPATIENT
Start: 2024-02-14 | End: 2024-02-14

## 2024-02-14 RX ORDER — DILTIAZEM HYDROCHLORIDE 5 MG/ML
25 INJECTION INTRAVENOUS ONCE
Status: COMPLETED | OUTPATIENT
Start: 2024-02-14 | End: 2024-02-14

## 2024-02-14 RX ORDER — ATORVASTATIN CALCIUM 10 MG/1
10 TABLET, FILM COATED ORAL EVERY EVENING
Status: DISCONTINUED | OUTPATIENT
Start: 2024-02-14 | End: 2024-02-21 | Stop reason: HOSPADM

## 2024-02-14 RX ORDER — DILTIAZEM HCL/D5W 125 MG/125
5-15 PLASTIC BAG, INJECTION (ML) INTRAVENOUS CONTINUOUS
Status: DISCONTINUED | OUTPATIENT
Start: 2024-02-14 | End: 2024-02-15

## 2024-02-14 RX ORDER — AMLODIPINE BESYLATE 5 MG/1
5 TABLET ORAL DAILY
Status: DISCONTINUED | OUTPATIENT
Start: 2024-02-15 | End: 2024-02-15

## 2024-02-14 RX ORDER — LIDOCAINE 40 MG/G
CREAM TOPICAL
Status: DISCONTINUED | OUTPATIENT
Start: 2024-02-14 | End: 2024-02-16

## 2024-02-14 RX ORDER — DILTIAZEM HYDROCHLORIDE 5 MG/ML
30 INJECTION INTRAVENOUS ONCE
Status: COMPLETED | OUTPATIENT
Start: 2024-02-14 | End: 2024-02-14

## 2024-02-14 RX ORDER — METHYLPREDNISOLONE SODIUM SUCCINATE 125 MG/2ML
60 INJECTION, POWDER, LYOPHILIZED, FOR SOLUTION INTRAMUSCULAR; INTRAVENOUS EVERY 12 HOURS
Status: DISCONTINUED | OUTPATIENT
Start: 2024-02-15 | End: 2024-02-15

## 2024-02-14 RX ORDER — CEFTRIAXONE 2 G/1
2 INJECTION, POWDER, FOR SOLUTION INTRAMUSCULAR; INTRAVENOUS EVERY 24 HOURS
Status: DISCONTINUED | OUTPATIENT
Start: 2024-02-14 | End: 2024-02-15

## 2024-02-14 RX ORDER — CALCIUM CARBONATE 500 MG/1
1000 TABLET, CHEWABLE ORAL 4 TIMES DAILY PRN
Status: DISCONTINUED | OUTPATIENT
Start: 2024-02-14 | End: 2024-02-21 | Stop reason: HOSPADM

## 2024-02-14 RX ADMIN — HEPARIN SODIUM 5000 UNITS: 5000 INJECTION, SOLUTION INTRAVENOUS; SUBCUTANEOUS at 22:18

## 2024-02-14 RX ADMIN — FUROSEMIDE 40 MG: 10 INJECTION, SOLUTION INTRAMUSCULAR; INTRAVENOUS at 19:55

## 2024-02-14 RX ADMIN — SODIUM CHLORIDE, POTASSIUM CHLORIDE, SODIUM LACTATE AND CALCIUM CHLORIDE 1000 ML: 600; 310; 30; 20 INJECTION, SOLUTION INTRAVENOUS at 17:02

## 2024-02-14 RX ADMIN — DIGOXIN 250 MCG: 0.25 INJECTION INTRAMUSCULAR; INTRAVENOUS at 20:33

## 2024-02-14 RX ADMIN — AZITHROMYCIN MONOHYDRATE 500 MG: 500 INJECTION, POWDER, LYOPHILIZED, FOR SOLUTION INTRAVENOUS at 20:47

## 2024-02-14 RX ADMIN — IPRATROPIUM BROMIDE AND ALBUTEROL SULFATE 3 ML: .5; 3 SOLUTION RESPIRATORY (INHALATION) at 18:18

## 2024-02-14 RX ADMIN — DILTIAZEM HYDROCHLORIDE 30 MG: 5 INJECTION INTRAVENOUS at 17:42

## 2024-02-14 RX ADMIN — Medication 5 MG/HR: at 22:42

## 2024-02-14 RX ADMIN — ATORVASTATIN CALCIUM 10 MG: 10 TABLET, FILM COATED ORAL at 20:29

## 2024-02-14 RX ADMIN — PREDNISONE 40 MG: 20 TABLET ORAL at 16:19

## 2024-02-14 RX ADMIN — IPRATROPIUM BROMIDE AND ALBUTEROL SULFATE 3 ML: .5; 3 SOLUTION RESPIRATORY (INHALATION) at 16:15

## 2024-02-14 RX ADMIN — CEFTRIAXONE 2 G: 2 INJECTION, POWDER, FOR SOLUTION INTRAMUSCULAR; INTRAVENOUS at 19:55

## 2024-02-14 RX ADMIN — IPRATROPIUM BROMIDE AND ALBUTEROL SULFATE 3 ML: .5; 3 SOLUTION RESPIRATORY (INHALATION) at 20:47

## 2024-02-14 RX ADMIN — DILTIAZEM HYDROCHLORIDE 25 MG: 5 INJECTION INTRAVENOUS at 16:35

## 2024-02-14 ASSESSMENT — ACTIVITIES OF DAILY LIVING (ADL)
ADLS_ACUITY_SCORE: 22
ADLS_ACUITY_SCORE: 35

## 2024-02-14 NOTE — ED PROVIDER NOTES
History     Chief Complaint:  Shortness of Breath    The history is provided by the patient.      Shilpa Miramontes is a 53 year old female with history of hypertension, former smoker, reactive airways, but no formal COPD or asthma diagnosis and no history of A-fib, presenting with shortness of breath for the past week that has gotten worse over the past couple days. She initially attributed this to getting over a cold. She was experiencing rhinorrhea and cough. She feels that she is susceptible to illness, noting she recently had RSV. Patient has fallen twice today due to shortness of breath and lightheadedness. The first time she fell was urinating and experienced shortness of breath when she stood up. She laid down and fell asleep. She sat up and called for her  and he found her on the ground. Patient sustained head trauma, but denies vomiting.  Not having significant head pain.  She has been eating and drinking normally. Patient denies chest pain. No blood thinner use. She takes blood pressure medication daily. She used to smoke, but notes she doesn't anymore.     Independent Historian:    None - Patient Only    Review of External Notes:  none    Allergies:  Morphine  Fluoxetine  Percocet [Oxycodone-Acetaminophen]     Physical Exam   Patient Vitals for the past 24 hrs:   BP Temp Temp src Pulse Resp SpO2 Height Weight   02/14/24 2021 101/69 -- -- (!) 134 -- 100 % -- --   02/14/24 2016 -- -- -- (!) 124 -- 100 % -- --   02/14/24 2010 101/86 -- -- -- -- 100 % -- --   02/14/24 1934 -- -- -- (!) 135 -- 100 % -- --   02/14/24 1930 (!) 106/97 -- -- -- -- -- -- --   02/14/24 1852 101/80 -- -- 95 24 93 % -- --   02/14/24 1840 113/78 -- -- 93 -- -- -- --   02/14/24 1839 -- -- -- 97 -- 96 % -- --   02/14/24 1820 109/71 -- -- 101 (!) 40 90 % -- --   02/14/24 1800 111/65 -- -- 90 -- 93 % -- --   02/14/24 1741 113/81 -- -- 83 -- 94 % -- --   02/14/24 1740 -- -- -- 107 -- 94 % -- --   02/14/24 1710 (!) 102/94 -- -- --  "-- -- -- --   02/14/24 1655 -- -- -- (!) 125 22 96 % -- --   02/14/24 1650 106/57 -- -- 102 -- 96 % -- --   02/14/24 1645 (!) 120/110 -- -- (!) 149 -- 91 % -- --   02/14/24 1640 (!) 82/57 -- -- (!) 172 26 95 % -- --   02/14/24 1610 (!) 131/105 -- -- (!) 156 -- 94 % -- --   02/14/24 1600 -- -- -- (!) 158 -- 98 % -- --   02/14/24 1435 106/84 98.2  F (36.8  C) Temporal 109 24 93 % 1.549 m (5' 1\") 127 kg (280 lb)      Physical Exam  GENERAL: Patient sitting in bed, speaking in shortened sentences, does appear short of breath.  HEAD: Atraumatic.  NECK: No rigidity  CV: Tachycardic and irregularly irregular.  PULM: Distant breath sounds bilaterally with inspiratory and expiratory wheezing.   ABD: Soft, nontender, nondistended, no guarding  DERM: No rash. Skin warm and dry  EXTREMITY: Moving all extremities without difficulty. No calf tenderness or peripheral edema  VASCULAR: Symmetric pulses bilaterally     Emergency Department Course   ECG  ECG interpreted by me.  Time 1548  Atrial fibrillation at 147. No ST elevation or depression. Normal intervals. Normal axis.   No evidence of WPW, Brugada, HOCM, ARVD, ASD, or Wellen's.  No comparison to prior.     Laboratory: Imaging:   Labs Ordered and Resulted from Time of ED Arrival to Time of ED Departure   CBC WITH PLATELETS AND DIFFERENTIAL - Abnormal       Result Value    WBC Count 9.0      RBC Count 4.43      Hemoglobin 13.0      Hematocrit 41.5      MCV 94      MCH 29.3      MCHC 31.3 (*)     RDW 13.6      Platelet Count 279      % Neutrophils 72      % Lymphocytes 18      % Monocytes 7      % Eosinophils 2      % Basophils 1      % Immature Granulocytes 0      NRBCs per 100 WBC 0      Absolute Neutrophils 6.5      Absolute Lymphocytes 1.6      Absolute Monocytes 0.6      Absolute Eosinophils 0.2      Absolute Basophils 0.1      Absolute Immature Granulocytes 0.0      Absolute NRBCs 0.0     NT PROBNP INPATIENT - Abnormal    N terminal Pro BNP Inpatient 1,647 (*)    MAGNESIUM " - Normal    Magnesium 2.0     BASIC METABOLIC PANEL - Normal    Sodium 141      Potassium 4.3      Chloride 105      Carbon Dioxide (CO2) 23      Anion Gap 13      Urea Nitrogen 17.9      Creatinine 0.85      GFR Estimate 82      Calcium 8.8      Glucose 99     INFLUENZA A/B, RSV, & SARS-COV2 PCR - Normal    Influenza A PCR Negative      Influenza B PCR Negative      RSV PCR Negative      SARS CoV2 PCR Negative     TSH WITH FREE T4 REFLEX - Normal    TSH 2.52     PROCALCITONIN - Normal    Procalcitonin 0.05     GLUCOSE MONITOR NURSING POCT     XR Chest Port 1 View   Final Result   IMPRESSION: Patchy interstitial opacities may represent pulmonary edema and/or sequelae of infection. No significant effusions or pneumothorax. Cardiomegaly. No acute osseous findings.      Echocardiogram Complete    (Results Pending)              Emergency Department Course & Assessments:    Interventions:  Medications   methylPREDNISolone sodium succinate (solu-MEDROL) injection 62.5 mg (has no administration in time range)   cefTRIAXone (ROCEPHIN) 2 g vial to attach to  ml bag for ADULTS or NS 50 ml bag for PEDS (2 g Intravenous $New Bag 2/14/24 1955)   azithromycin 500 mg (ZITHROMAX) in 0.9% NaCl 250 mL intermittent infusion 500 mg (500 mg Intravenous $New Bag 2/14/24 2047)   lisinopril (ZESTRIL) tablet 10 mg (has no administration in time range)   amLODIPine (NORVASC) tablet 5 mg (has no administration in time range)   ipratropium - albuterol 0.5 mg/2.5 mg/3 mL (DUONEB) neb solution 3 mL (has no administration in time range)   lidocaine 1 % 0.1-1 mL (has no administration in time range)   lidocaine (LMX4) cream (has no administration in time range)   sodium chloride (PF) 0.9% PF flush 3 mL (has no administration in time range)   sodium chloride (PF) 0.9% PF flush 3 mL (has no administration in time range)   senna-docusate (SENOKOT-S/PERICOLACE) 8.6-50 MG per tablet 1 tablet (has no administration in time range)     Or    senna-docusate (SENOKOT-S/PERICOLACE) 8.6-50 MG per tablet 2 tablet (has no administration in time range)   calcium carbonate (TUMS) chewable tablet 1,000 mg (has no administration in time range)   heparin ANTICOAGULANT injection 5,000 Units (has no administration in time range)   atorvastatin (LIPITOR) tablet 10 mg (10 mg Oral $Given 2/14/24 2029)   ipratropium - albuterol 0.5 mg/2.5 mg/3 mL (DUONEB) neb solution 3 mL (3 mLs Nebulization $Given 2/14/24 2047)   predniSONE (DELTASONE) tablet 40 mg (40 mg Oral $Given 2/14/24 1619)   diltiazem (CARDIZEM) injection 25 mg (25 mg Intravenous $Given 2/14/24 1635)   lactated ringers BOLUS 1,000 mL (0 mLs Intravenous Stopped 2/14/24 1939)   diltiazem (CARDIZEM) injection 30 mg (30 mg Intravenous $Given 2/14/24 1742)   furosemide (LASIX) injection 40 mg (40 mg Intravenous $Given 2/14/24 1955)   digoxin (LANOXIN) injection 250 mcg (250 mcg Intravenous $Given 2/14/24 2033)      Assessments, Independent Interpretation, Consult/Discussion of ManagementTests:  ED Course as of 02/14/24 2053 Wed Feb 14, 2024   1607 I obtained the history and examined the patient as noted above.      Chest x-ray-bilateral patchy opacities    I discussed admission and the plan of care with the Hospitalist.    Social Determinants of Health affecting care:  None    Disposition:  The patient was admitted to the hospital under the care of Dr. Bailey.     Impression & Plan         Medical Decision Making:  Patient presenting with wheezing and shortness of breath.  Chronic conditions complicate-former smoker, recurrent reactive airway exacerbations.  Differential diagnosis-considered COPD exacerbation, infection, CHF, among others.  Vital signs notable for tachycardic in the 160s.  She was noted to be in A-fib with RVR.  No prior history of this.  I gave the patient diltiazem which improved her rate.  Her rate misty again so given a second dose, which also transiently improved her rate.  She was also  given DuoNebs upfront due to the wheezing.  On repeat lung assessment her lungs now sound clear.  She was feeling improved transiently but then her symptoms returned.  She was given additional DuoNebs.  She was started on BiPAP which significant helped her symptoms.  Her labs were notable for BNP at 1600.  Uncertain if this is a new CHF or could also be secondary to the demand of her being in A-fib with RVR and dyspnea for the last week.  Written for Lasix in case there is a component of CHF.  Heart rate returned again to the 140s.  Blood pressure borderline at low 100s so will hold off any further AV batsheva blockers.  Do not think she requires emergent cardioversion.  Therefore did a trial of digoxin.  Patient was also given prednisone as I suspect she likely has COPD.  Chest x-ray showing vascular congestion versus questionable infection.  She has no white count or fever.  Pneumonia seems less likely.  Discussed with hospitalist NP who will admit.  They will start patient on antibiotics.  Patient is wheezing and returned which I think would also be contributing to her increased rate so she was given other DuoNeb which wass improving her symptoms and she was [place] [facility] [place]    Critical Care  Critical Care is defined as an illness or injury that acutely impairs one or more vital organ systems such that there is a high probability of imminent or life-threatening deterioration in the patient's condition and that the failure to initiate these interventions on an urgent basis would likely result in sudden, clinically significant or life-threatening deterioration in the patient's condition.    The critical condition was:  respiratory distress    Critical interventions performed or strongly considered: Non-invasive Ventilation: BiPAP/CPAP    Critical care time was 30 minutes exclusive of time spent on separately billable procedures.    For purposes of time:  Procedures included in CC time: interpretation  of NICOM, CXR, SpO2, VBG/ABG, interpretation of physiologic data, OG placement, temporary transcutaneous/transvenous pacing, ventilator management  Common separately billable procedures (not included in CC time): CPR, wound repair, endotracheal intubation, central line placement, intraosseous placement, tube thoracostomy, temporary transvenous pacemaker, EKG, electrical cardioversion      Diagnosis:    ICD-10-CM    1. Dyspnea, unspecified type  R06.00       2. Atrial fibrillation with RVR (H)  I48.91       3. Wheezing  R06.2            Discharge Medications:  New Prescriptions    No medications on file      Scribe Disclosure:  Manisha LY, am serving as a scribe at 4:46 PM on 2/14/2024 to document services personally performed by Carlos Ramirez MD based on my observations and the provider's statements to me.  2/14/2024   Carlos Ramirez MD Foss, Kevin, MD  02/14/24 2053

## 2024-02-14 NOTE — ED TRIAGE NOTES
"Pt c/o shortness of breath for several weeks. Today had 2 episodes of syncope. Pt has audile expiratory wheezing. Had RSV 2-3 months ago, seen at  3 weeks ago and given ABO for sinus infection. Pt reports the sinus symptoms resolved, but pt states \"I feel like it all went to my chest\" c/o fatigue.      Triage Assessment (Adult)       Row Name 02/14/24 3801          Triage Assessment    Airway WDL WDL        Respiratory WDL    Respiratory WDL cough  c/o shortness of breath     Cough Type nonproductive        Skin Circulation/Temperature WDL    Skin Circulation/Temperature WDL WDL        Cardiac WDL    Cardiac WDL WDL        Peripheral/Neurovascular WDL    Peripheral Neurovascular WDL WDL        Cognitive/Neuro/Behavioral WDL    Cognitive/Neuro/Behavioral WDL WDL                     "

## 2024-02-15 ENCOUNTER — APPOINTMENT (OUTPATIENT)
Dept: CARDIOLOGY | Facility: CLINIC | Age: 54
End: 2024-02-15
Attending: NURSE PRACTITIONER
Payer: COMMERCIAL

## 2024-02-15 LAB
ALBUMIN SERPL BCG-MCNC: 3.8 G/DL (ref 3.5–5.2)
ALP SERPL-CCNC: 117 U/L (ref 40–150)
ALT SERPL W P-5'-P-CCNC: 64 U/L (ref 0–50)
ANION GAP SERPL CALCULATED.3IONS-SCNC: 12 MMOL/L (ref 7–15)
APTT PPP: 33 SECONDS (ref 22–38)
AST SERPL W P-5'-P-CCNC: 30 U/L (ref 0–45)
ATRIAL RATE - MUSE: 97 BPM
BILIRUB SERPL-MCNC: 0.3 MG/DL
BUN SERPL-MCNC: 20.2 MG/DL (ref 6–20)
CALCIUM SERPL-MCNC: 8.7 MG/DL (ref 8.6–10)
CHLORIDE SERPL-SCNC: 108 MMOL/L (ref 98–107)
CHOLEST SERPL-MCNC: 145 MG/DL
CREAT SERPL-MCNC: 0.97 MG/DL (ref 0.51–0.95)
DEPRECATED HCO3 PLAS-SCNC: 22 MMOL/L (ref 22–29)
DIASTOLIC BLOOD PRESSURE - MUSE: NORMAL MMHG
EGFRCR SERPLBLD CKD-EPI 2021: 70 ML/MIN/1.73M2
ERYTHROCYTE [DISTWIDTH] IN BLOOD BY AUTOMATED COUNT: 13.8 % (ref 10–15)
ERYTHROCYTE [DISTWIDTH] IN BLOOD BY AUTOMATED COUNT: 13.8 % (ref 10–15)
GLUCOSE BLDC GLUCOMTR-MCNC: 151 MG/DL (ref 70–99)
GLUCOSE SERPL-MCNC: 122 MG/DL (ref 70–99)
HBA1C MFR BLD: 6.2 %
HCT VFR BLD AUTO: 39.5 % (ref 35–47)
HCT VFR BLD AUTO: 39.8 % (ref 35–47)
HDLC SERPL-MCNC: 36 MG/DL
HGB BLD-MCNC: 11.8 G/DL (ref 11.7–15.7)
HGB BLD-MCNC: 12.1 G/DL (ref 11.7–15.7)
INR PPP: 1.06 (ref 0.85–1.15)
INTERPRETATION ECG - MUSE: NORMAL
LDLC SERPL CALC-MCNC: 87 MG/DL
LVEF ECHO: NORMAL
MAGNESIUM SERPL-MCNC: 2.1 MG/DL (ref 1.7–2.3)
MCH RBC QN AUTO: 28.9 PG (ref 26.5–33)
MCH RBC QN AUTO: 29.3 PG (ref 26.5–33)
MCHC RBC AUTO-ENTMCNC: 29.9 G/DL (ref 31.5–36.5)
MCHC RBC AUTO-ENTMCNC: 30.4 G/DL (ref 31.5–36.5)
MCV RBC AUTO: 96 FL (ref 78–100)
MCV RBC AUTO: 97 FL (ref 78–100)
NONHDLC SERPL-MCNC: 109 MG/DL
NT-PROBNP SERPL-MCNC: 2007 PG/ML (ref 0–900)
P AXIS - MUSE: NORMAL DEGREES
PLATELET # BLD AUTO: 254 10E3/UL (ref 150–450)
PLATELET # BLD AUTO: 265 10E3/UL (ref 150–450)
POTASSIUM SERPL-SCNC: 4.9 MMOL/L (ref 3.4–5.3)
PR INTERVAL - MUSE: NORMAL MS
PROT SERPL-MCNC: 7.4 G/DL (ref 6.4–8.3)
QRS DURATION - MUSE: 72 MS
QT - MUSE: 298 MS
QTC - MUSE: 466 MS
R AXIS - MUSE: 81 DEGREES
RBC # BLD AUTO: 4.08 10E6/UL (ref 3.8–5.2)
RBC # BLD AUTO: 4.13 10E6/UL (ref 3.8–5.2)
SODIUM SERPL-SCNC: 142 MMOL/L (ref 135–145)
SYSTOLIC BLOOD PRESSURE - MUSE: NORMAL MMHG
T AXIS - MUSE: 45 DEGREES
TRIGL SERPL-MCNC: 108 MG/DL
TROPONIN T SERPL HS-MCNC: 12 NG/L
VENTRICULAR RATE- MUSE: 147 BPM
WBC # BLD AUTO: 9.1 10E3/UL (ref 4–11)
WBC # BLD AUTO: 9.1 10E3/UL (ref 4–11)

## 2024-02-15 PROCEDURE — 93306 TTE W/DOPPLER COMPLETE: CPT | Mod: 26 | Performed by: INTERNAL MEDICINE

## 2024-02-15 PROCEDURE — 250N000013 HC RX MED GY IP 250 OP 250 PS 637: Performed by: INTERNAL MEDICINE

## 2024-02-15 PROCEDURE — 999N000208 ECHOCARDIOGRAM COMPLETE

## 2024-02-15 PROCEDURE — 84484 ASSAY OF TROPONIN QUANT: CPT | Performed by: NURSE PRACTITIONER

## 2024-02-15 PROCEDURE — 250N000011 HC RX IP 250 OP 636: Performed by: NURSE PRACTITIONER

## 2024-02-15 PROCEDURE — 85027 COMPLETE CBC AUTOMATED: CPT | Performed by: INTERNAL MEDICINE

## 2024-02-15 PROCEDURE — 85610 PROTHROMBIN TIME: CPT | Performed by: NURSE PRACTITIONER

## 2024-02-15 PROCEDURE — 99233 SBSQ HOSP IP/OBS HIGH 50: CPT | Performed by: INTERNAL MEDICINE

## 2024-02-15 PROCEDURE — 250N000009 HC RX 250: Performed by: INTERNAL MEDICINE

## 2024-02-15 PROCEDURE — 82465 ASSAY BLD/SERUM CHOLESTEROL: CPT | Performed by: NURSE PRACTITIONER

## 2024-02-15 PROCEDURE — 36415 COLL VENOUS BLD VENIPUNCTURE: CPT | Performed by: INTERNAL MEDICINE

## 2024-02-15 PROCEDURE — 255N000002 HC RX 255 OP 636: Performed by: INTERNAL MEDICINE

## 2024-02-15 PROCEDURE — 250N000013 HC RX MED GY IP 250 OP 250 PS 637: Performed by: HOSPITALIST

## 2024-02-15 PROCEDURE — 36415 COLL VENOUS BLD VENIPUNCTURE: CPT | Performed by: NURSE PRACTITIONER

## 2024-02-15 PROCEDURE — 83880 ASSAY OF NATRIURETIC PEPTIDE: CPT | Performed by: NURSE PRACTITIONER

## 2024-02-15 PROCEDURE — 85027 COMPLETE CBC AUTOMATED: CPT | Performed by: NURSE PRACTITIONER

## 2024-02-15 PROCEDURE — 83036 HEMOGLOBIN GLYCOSYLATED A1C: CPT | Performed by: NURSE PRACTITIONER

## 2024-02-15 PROCEDURE — 80053 COMPREHEN METABOLIC PANEL: CPT | Performed by: NURSE PRACTITIONER

## 2024-02-15 PROCEDURE — 250N000012 HC RX MED GY IP 250 OP 636 PS 637: Performed by: INTERNAL MEDICINE

## 2024-02-15 PROCEDURE — 83735 ASSAY OF MAGNESIUM: CPT | Performed by: INTERNAL MEDICINE

## 2024-02-15 PROCEDURE — 120N000001 HC R&B MED SURG/OB

## 2024-02-15 PROCEDURE — 250N000011 HC RX IP 250 OP 636: Performed by: INTERNAL MEDICINE

## 2024-02-15 PROCEDURE — 85730 THROMBOPLASTIN TIME PARTIAL: CPT | Performed by: NURSE PRACTITIONER

## 2024-02-15 PROCEDURE — 99223 1ST HOSP IP/OBS HIGH 75: CPT | Mod: 25 | Performed by: INTERNAL MEDICINE

## 2024-02-15 PROCEDURE — 250N000013 HC RX MED GY IP 250 OP 250 PS 637: Performed by: NURSE PRACTITIONER

## 2024-02-15 RX ORDER — GUAIFENESIN 200 MG/10ML
200 LIQUID ORAL EVERY 4 HOURS PRN
Status: DISCONTINUED | OUTPATIENT
Start: 2024-02-15 | End: 2024-02-21 | Stop reason: HOSPADM

## 2024-02-15 RX ORDER — HEPARIN SODIUM 10000 [USP'U]/100ML
0-5000 INJECTION, SOLUTION INTRAVENOUS CONTINUOUS
Status: DISCONTINUED | OUTPATIENT
Start: 2024-02-15 | End: 2024-02-15

## 2024-02-15 RX ORDER — METOPROLOL TARTRATE 1 MG/ML
10 INJECTION, SOLUTION INTRAVENOUS EVERY 4 HOURS PRN
Status: DISCONTINUED | OUTPATIENT
Start: 2024-02-15 | End: 2024-02-18

## 2024-02-15 RX ORDER — ACETAMINOPHEN 325 MG/1
650 TABLET ORAL EVERY 4 HOURS PRN
Status: DISCONTINUED | OUTPATIENT
Start: 2024-02-15 | End: 2024-02-21 | Stop reason: HOSPADM

## 2024-02-15 RX ORDER — DILTIAZEM HYDROCHLORIDE 30 MG/1
60 TABLET, FILM COATED ORAL EVERY 6 HOURS SCHEDULED
Status: DISCONTINUED | OUTPATIENT
Start: 2024-02-15 | End: 2024-02-16

## 2024-02-15 RX ORDER — ACETAMINOPHEN 650 MG/1
650 SUPPOSITORY RECTAL EVERY 4 HOURS PRN
Status: DISCONTINUED | OUTPATIENT
Start: 2024-02-15 | End: 2024-02-21 | Stop reason: HOSPADM

## 2024-02-15 RX ORDER — DILTIAZEM HYDROCHLORIDE 30 MG/1
30 TABLET, FILM COATED ORAL EVERY 6 HOURS SCHEDULED
Status: DISCONTINUED | OUTPATIENT
Start: 2024-02-15 | End: 2024-02-15

## 2024-02-15 RX ORDER — FUROSEMIDE 10 MG/ML
40 INJECTION INTRAMUSCULAR; INTRAVENOUS EVERY 8 HOURS
Status: DISCONTINUED | OUTPATIENT
Start: 2024-02-15 | End: 2024-02-16

## 2024-02-15 RX ORDER — PREDNISONE 20 MG/1
40 TABLET ORAL DAILY
Status: DISCONTINUED | OUTPATIENT
Start: 2024-02-15 | End: 2024-02-16

## 2024-02-15 RX ORDER — METOPROLOL TARTRATE 25 MG/1
25 TABLET, FILM COATED ORAL EVERY 6 HOURS
Status: DISCONTINUED | OUTPATIENT
Start: 2024-02-15 | End: 2024-02-15

## 2024-02-15 RX ADMIN — DILTIAZEM HYDROCHLORIDE 30 MG: 30 TABLET, FILM COATED ORAL at 11:41

## 2024-02-15 RX ADMIN — DILTIAZEM HYDROCHLORIDE 60 MG: 30 TABLET, FILM COATED ORAL at 20:48

## 2024-02-15 RX ADMIN — APIXABAN 5 MG: 5 TABLET, FILM COATED ORAL at 11:41

## 2024-02-15 RX ADMIN — FUROSEMIDE 40 MG: 10 INJECTION, SOLUTION INTRAMUSCULAR; INTRAVENOUS at 08:57

## 2024-02-15 RX ADMIN — PREDNISONE 40 MG: 20 TABLET ORAL at 08:56

## 2024-02-15 RX ADMIN — METOPROLOL TARTRATE 10 MG: 5 INJECTION INTRAVENOUS at 18:21

## 2024-02-15 RX ADMIN — HUMAN ALBUMIN MICROSPHERES AND PERFLUTREN 3 ML: 10; .22 INJECTION, SOLUTION INTRAVENOUS at 11:18

## 2024-02-15 RX ADMIN — METOPROLOL TARTRATE 25 MG: 25 TABLET, FILM COATED ORAL at 08:56

## 2024-02-15 RX ADMIN — FUROSEMIDE 40 MG: 10 INJECTION, SOLUTION INTRAMUSCULAR; INTRAVENOUS at 16:29

## 2024-02-15 RX ADMIN — METHYLPREDNISOLONE SODIUM SUCCINATE 62.5 MG: 125 INJECTION, POWDER, FOR SOLUTION INTRAMUSCULAR; INTRAVENOUS at 05:59

## 2024-02-15 RX ADMIN — APIXABAN 5 MG: 5 TABLET, FILM COATED ORAL at 20:47

## 2024-02-15 RX ADMIN — ACETAMINOPHEN 650 MG: 325 TABLET, FILM COATED ORAL at 16:36

## 2024-02-15 RX ADMIN — ATORVASTATIN CALCIUM 10 MG: 10 TABLET, FILM COATED ORAL at 20:47

## 2024-02-15 RX ADMIN — HEPARIN SODIUM 5000 UNITS: 5000 INJECTION, SOLUTION INTRAVENOUS; SUBCUTANEOUS at 05:59

## 2024-02-15 RX ADMIN — HEPARIN SODIUM 1200 UNITS/HR: 10000 INJECTION, SOLUTION INTRAVENOUS at 09:06

## 2024-02-15 RX ADMIN — ACETAMINOPHEN 650 MG: 325 TABLET, FILM COATED ORAL at 00:24

## 2024-02-15 RX ADMIN — DILTIAZEM HYDROCHLORIDE 30 MG: 30 TABLET, FILM COATED ORAL at 17:42

## 2024-02-15 ASSESSMENT — ACTIVITIES OF DAILY LIVING (ADL)
ADLS_ACUITY_SCORE: 27
ADLS_ACUITY_SCORE: 22
ADLS_ACUITY_SCORE: 27

## 2024-02-15 NOTE — PLAN OF CARE
Goal Outcome Evaluation:      Plan of Care Reviewed With: patient    Overall Patient Progress: improvingOverall Patient Progress: improving     Temp: 98.3  F (36.8  C) Temp src: Oral BP: 114/77 Pulse: 90   Resp: 20 SpO2: 95 % O2 Device: Oxymask Oxygen Delivery: 6 LPM    A&Ox4, denies pain. Tele- Afib VVR mostly CVR on shift. K and mag protocols-AM rechecks. SBA up to bathroom and chair. Dilt drip dc'd and oral started. Also started on DOAC and heparin drip dc'd. <2400na low fat diet ordered today as pt won't have cardioversion here and will instead have one in 3-4 weeks as outpatient. 2RPIV saline locked. Amlodipine and lisonopril dc'd as well. Started on IV lasix and will need to be on it for a couple days prior to discharge.Discharge TBD.

## 2024-02-15 NOTE — PROVIDER NOTIFICATION
This nurse paged crosscover MD if MD is okay with pt's BP to under 100 but above 90 when on the diltiazem gtt. Okay per MD.

## 2024-02-15 NOTE — CONSULTS
[facility]    Cardiology Consultation     Date of Admission:  2/14/2024    Assessment & Plan   Acute diastolic heart failure  Atrial fibrillation rapid ventricular response  Moderate aortic stenosis  Morbid obesity  COPD  High likelihood of obstructive sleep apnea  Hypertension    This is delightful lady likely developed acute diastolic heart failure due to atrial fibrillation rapid ventricular response.  Though aortic stenosis, moderate in severity, is likely a contributor in the presence of this arrhythmia.  In addition the presence of morbid obesity probable obstructive sleep apnea would make development of diastolic heart failure more likely as well    Recommendations  Continue diuresis.  Transition to oral diltiazem: heart rate appears better controlled.  Would avoid beta-blockers due to active wheezing  Start apixaban as CHADS2 Vascor is at least 3.  Plan for outpatient cardioversion after 3 to 4 weeks of therapeutic anticoagulation if she remains in atrial fibrillation  Sleep study    >80 minutes of time spent today pre and post charting, reviewing pertinent investigations personally as well as extensive review of previous records and coordinating plans for further evaluation.     DR LYLY AYALA MD, MD    Primary Care Physician   [facility]    Reason for Consult   Reason for consult: I was asked by Dr iYn to evaluate this patient for atrial fibrillation and aortic stenosis.    History of Present Illness   Shilpa Miramontes is a 53 year old female who presents with shortness of breath    She is a 53-year-old lady with a medical history of non-rheumatic aortic valve stenosis, undiagnosed COPD, former smoker, pre-diabetes, hypertension, hyperlipidemia, morbid obesity, cholecystectomy, and RSV infection in November 2023.    She had an echocardiogram at [facility] in October 2023.  This was a technically difficult study but was moderate and moderate aortic  stenosis with a peak velocity across the valve of 3.3 m/s, mean gradient 24 mmHg and aortic regurgitation.  There is mild concentric left ventricular hypertrophy with preserved left ventricular systolic function, normal right ventricular size and function    She tells me her health has not been great since her COVID.  She has been immobile and has gained more than 20 pounds in weight.  Since she contracted RSV several months ago she has had recurrent cough and sinus congestion.  She presents with worsening shortness of breath on exertion for the past week but no chest pain.  She is not sure she has gained any weight.  She that she passed out twice from coughing.  She denies PND orthopnea but thinks she has sleep apnea as she falls asleep frequently    She is a smoker of a pack of cigarettes a day for years.  She gave up 2 years ago.  No history of drug or alcohol abuse.    She was found to be in atrial fibrillation with rapid ventricular response on this admission.  Last EKG in June 2023 reported normal sinus rhythm    Regular medications from home include Ventolin inhaler amlodipine 5 mg daily and lisinopril 10 mg daily    Sodium 142 potassium 3.6 chloride 108 creatinine 0.97 GFR estimate of 70.  CBC within normal limits  HbA1c 6.2, NT proBNP 2007.  Negative troponins  EKG shows atrial fibrillation with rapid ventricular response, ventricular rates 140s to 160 admission, no acute changes  Chest x-ray shows patchy opacities consistent with pulmonary edema      Patient Active Problem List   Diagnosis    CARDIOVASCULAR SCREENING; LDL GOAL LESS THAN 160    Lung disease    Personal history of tobacco use, presenting hazards to health    Elevated blood-pressure reading without diagnosis of hypertension    Dyspnea, unspecified type       Past Medical History   I have reviewed this patient's medical history and updated it with pertinent information if needed.   No past medical history on file.    Past Surgical History   I  have reviewed this patient's surgical history and updated it with pertinent information if needed.  Past Surgical History:   Procedure Laterality Date    CHOLECYSTECTOMY  2004       Prior to Admission Medications   Prior to Admission Medications   Prescriptions Last Dose Informant Patient Reported? Taking?   albuterol (VENTOLIN HFA) 108 (90 Base) MCG/ACT inhaler More than a month  No Yes   Sig: Inhale 2 puffs into the lungs every 6 hours as needed for shortness of breath, wheezing or cough   amLODIPine (NORVASC) 5 MG tablet 2/14/2024 at am  No Yes   Sig: Take 1 tablet (5 mg) by mouth daily   fluticasone (FLONASE) 50 MCG/ACT nasal spray Past Month  Yes Yes   Sig: Spray 1 spray into both nostrils daily as needed for rhinitis or allergies   lisinopril (ZESTRIL) 10 MG tablet 2/14/2024 at am  No Yes   Sig: Take 1 tablet (10 mg) by mouth daily      Facility-Administered Medications: None     Current Facility-Administered Medications   Medication Dose Route Frequency    atorvastatin  10 mg Oral QPM    furosemide  40 mg Intravenous Q8H    metoprolol tartrate  25 mg Oral Q6H    predniSONE  40 mg Oral Daily    sodium chloride (PF)  3 mL Intracatheter Q8H    sodium chloride (PF)  3 mL Intracatheter Q8H     Current Facility-Administered Medications   Medication Last Rate    dilTIAZem 15 mg/hr (02/15/24 0325)    heparin       Allergies   Allergies   Allergen Reactions    Morphine      rash    Fluoxetine Rash    Percocet [Oxycodone-Acetaminophen] Rash       Social History    reports that she has been smoking cigarettes. She has been smoking an average of 0.5 packs per day. She has never used smokeless tobacco. She reports current alcohol use. She reports that she does not use drugs.    Family History   Family History   Problem Relation Age of Onset    Family History Negative Mother     Family History Negative Father     Prostate Cancer Paternal Grandfather 88       Review of Systems   The comprehensive 10 point Review of Systems  "is negative other than noted in the HPI or here.     Physical Exam   Vital Signs with Ranges  Temp:  [97.8  F (36.6  C)-98.4  F (36.9  C)] 97.9  F (36.6  C)  Pulse:  [] 85  Resp:  [22-40] 22  BP: ()/() 116/84  FiO2 (%):  [35 %] 35 %  SpO2:  [90 %-100 %] 95 %  Wt Readings from Last 4 Encounters:   02/15/24 140.2 kg (309 lb 1.4 oz)   11/27/23 135.2 kg (298 lb)   01/21/21 127 kg (280 lb)   01/24/18 117.9 kg (260 lb)     I/O last 3 completed shifts:  In: 10 [I.V.:10]  Out: 575 [Urine:575]      Vitals: /84 (BP Location: Left arm)   Pulse 85   Temp 97.9  F (36.6  C) (Oral)   Resp 22   Ht 1.549 m (5' 1\")   Wt 140.2 kg (309 lb 1.4 oz)   LMP 12/14/2020   SpO2 95%   BMI 58.40 kg/m      In no acute distress.  No clubbing no peripheral or central cyanosis  Mucous membranes appear normal  JVP not elevated  No thyromegaly  Cardiac apex nonpalpable auscultation reveals a soft systolic murmur with preserved first and second heart sound  Chest examination reveals soft expiratory wheezing  Abdomen obese, nontender  1+ bilateral ankle edema  Central nervous system examination grossly intact.    No lab results found in last 7 days.    Invalid input(s): \"TROPONINIES\"    Recent Labs   Lab 02/15/24  0816 02/15/24  0807 02/15/24  0636 02/14/24  2212 02/14/24  2133 02/14/24  1619   WBC  --  9.1 9.1  --   --  9.0   HGB  --  12.1 11.8  --   --  13.0   MCV  --  96 97  --   --  94   PLT  --  254 265  --   --  279   INR  --   --  1.06  --   --   --    NA  --   --  142 141  --  141   POTASSIUM  --   --  4.9 4.2  --  4.3   CHLORIDE  --   --  108* 105  --  105   CO2  --   --  22 20*  --  23   BUN  --   --  20.2* 15.9  --  17.9   CR  --   --  0.97* 0.87  --  0.85   GFRESTIMATED  --   --  70 80  --  82   ANIONGAP  --   --  12 16*  --  13   KRISTA  --   --  8.7 8.6  --  8.8   *  --  122* 162*   < > 99   ALBUMIN  --   --  3.8 4.0  --   --    PROTTOTAL  --   --  7.4 7.7  --   --    BILITOTAL  --   --  0.3 0.3  --   -- " "   ALKPHOS  --   --  117 121  --   --    ALT  --   --  64* 72*  --   --    AST  --   --  30 33  --   --     < > = values in this interval not displayed.     No results for input(s): \"CHOL\", \"HDL\", \"LDL\", \"TRIG\", \"CHOLHDLRATIO\" in the last 45798 hours.  Recent Labs   Lab 02/15/24  0807 02/15/24  0636 02/14/24  1619   WBC 9.1 9.1 9.0   HGB 12.1 11.8 13.0   HCT 39.8 39.5 41.5   MCV 96 97 94    265 279     No results for input(s): \"PH\", \"PHV\", \"PO2\", \"PO2V\", \"SAT\", \"PCO2\", \"PCO2V\", \"HCO3\", \"HCO3V\" in the last 168 hours.  Recent Labs   Lab 02/15/24  0636 02/14/24  1619   NTBNPI 2,007* 1,647*     No results for input(s): \"DD\" in the last 168 hours.  No results for input(s): \"SED\", \"CRP\" in the last 168 hours.  Recent Labs   Lab 02/15/24  0807 02/15/24  0636 02/14/24  1619    265 279     Recent Labs   Lab 02/14/24  1619   TSH 2.52     No results for input(s): \"COLOR\", \"APPEARANCE\", \"URINEGLC\", \"URINEBILI\", \"URINEKETONE\", \"SG\", \"UBLD\", \"URINEPH\", \"PROTEIN\", \"UROBILINOGEN\", \"NITRITE\", \"LEUKEST\", \"RBCU\", \"WBCU\" in the last 168 hours.    Imaging:  Recent Results (from the past 48 hour(s))   XR Chest Port 1 View    Narrative    EXAM: XR CHEST PORT 1 VIEW  LOCATION: [facility]  DATE: 2/14/2024    INDICATION: Shortness of breath  COMPARISON: 11/27/2023.      Impression    IMPRESSION: Patchy interstitial opacities may represent pulmonary edema and/or sequelae of infection. No significant effusions or pneumothorax. Cardiomegaly. No acute osseous findings.       Echo:  No results found for this or any previous visit (from the past 4320 hour(s)).       Clinically Significant Risk Factors Present on Admission                  # Hypertension: Home medication list includes antihypertensive(s)      # Severe Obesity: Estimated body mass index is 58.4 kg/m  as calculated from the following:    Height as of this encounter: 1.549 m (5' 1\").    Weight as of this encounter: 140.2 kg (309 lb 1.4 oz).       "       Cardiac Arrhythmia: Atrial fibrillation: Persistent  Non-Rheumatic Valve Disease: Aortic valve stenosis  COPD

## 2024-02-15 NOTE — PHARMACY-ADMISSION MEDICATION HISTORY
Pharmacist Admission Medication History    Admission medication history is complete. The information provided in this note is only as accurate as the sources available at the time of the update.    Information Source(s): Patient via in-person  Pertinent Information: none    Changes made to PTA medication list:  Added: fluticasone nasal spray  Deleted: duplicate albuterol, guaifenesin/codeine, levofloxacin, prednisone  Changed: None    Allergies reviewed with patient and updates made in EHR: no  Medication History Completed By: Vitaliy Lozada RPH 2/14/2024 7:56 PM    Prior to Admission medications    Medication Sig Last Dose Taking? Auth Provider Long Term End Date   albuterol (VENTOLIN HFA) 108 (90 Base) MCG/ACT inhaler Inhale 2 puffs into the lungs every 6 hours as needed for shortness of breath, wheezing or cough More than a month Yes Flaquita Callaway MD Yes    amLODIPine (NORVASC) 5 MG tablet Take 1 tablet (5 mg) by mouth daily 2/14/2024 at am Yes Claude Kirk MD Yes    fluticasone (FLONASE) 50 MCG/ACT nasal spray Spray 1 spray into both nostrils daily as needed for rhinitis or allergies Past Month Yes Unknown, Entered By History     lisinopril (ZESTRIL) 10 MG tablet Take 1 tablet (10 mg) by mouth daily 2/14/2024 at am Yes Claude Kirk MD Yes

## 2024-02-15 NOTE — H&P
[facility]    History and Physical - Hospitalist Service       Date of Admission:  2/14/2024    Assessment & Plan   Shilpa Miramontes is a 53-year-old female with a medical history of non-rheumatic aortic valve stenosis, undiagnosed COPD, former smoker, pre-diabetes, hypertension, hyperlipidemia, morbid obesity, cholecystectomy, and RSV infection in November 2023 who presented to the [facility] emergency department for the evaluation of shortness of breath and she was found to be in a new onset of atrial fibrillation.      #.  Shortness of breath, worse on exertion  #.  Cough  #.  Tachypnea  The patient has been on and off ill since she was diagnosed RSV in November 2023.  The current symptoms started about a week ago, and today she has fallen twice due to progressive weakness and shortness of breath.  Double providers has been questioned the diagnosis of COPD but the patient has not been formally diagnosed.  Patient is tachypneic, RR oin the 40s and the patient was placed on BiPAP. CBC negative for leukocytosis (WBC at 9), and procalcitonin is at 0.05.  TSH 2.52 within normal limits.  Influenza, RSV, and COVID came back negative.  proBNP was slightly elevated at 1,647, the patient received 40 mg of IV Lasix in the ED. Also 40 mg of oral prednisone given to patient in the ED.  EKG with Afib without ST elevation or depression.  Chest x-ray showed patchy interstitial opacities concerning for pulmonary edema versus sequela of infection. The patient has a respiratory distress possibly related to COPD exacerbation versus heart failure.  -Admit to intermediate care level floor  -Continue with the BiPAP  -IV Solu-Medrol 60 mg every 12 hours  -IV Ceftriaxone and Azithromycin for possible pneumonia  -Duoneb q 4 hours as needed    #.  New onset of atrial fibrillation with RVR  The patient noted to have a new onset of atrial fibrillation with RVR (HR ranging 124 - 168).  The patient received x 2  "doses of Diltiazem, and Digoxin.  -Echocardiogram in the morning  -The patient would benefit cardiology consult    #.  Hypertension  -Continue with PTA Lisinopril and Amlodipine    #.  Hyperlipidemia  -Continue with PTA Atorvastatin    #.  Prediabetes  -Monitor blood glucose  -Hgb A1c in am     Diet: NPO for Medical/Clinical Reasons Except for: Meds, Ice Chips    DVT Prophylaxis: Heparin SQ  Xiao Catheter: Not present  Lines: None     Cardiac Monitoring: ACTIVE order. Indication: Tachyarrhythmias, acute (48 hours)  Code Status: Full Code      Clinically Significant Risk Factors Present on Admission                  # Hypertension: Home medication list includes antihypertensive(s)   # Non-Invasive mechanical ventilation: current O2 Device: BiPAP/CPAP  # Acute hypoxic respiratory failure: continue supplemental O2 as needed     # Severe Obesity: Estimated body mass index is 52.91 kg/m  as calculated from the following:    Height as of this encounter: 1.549 m (5' 1\").    Weight as of this encounter: 127 kg (280 lb).              Disposition Plan      Expected Discharge Date: 02/16/2024                The patient's care was discussed with the Attending Physician, Dr. Bailey .    JOYCE Monk [facility]  Hospitalist Service  [facility]  Securely message with [employer] (more info)  Text page via [employer] Paging/Directory     ______________________________________________________________________    Chief Complaint   Shortness of breath     History is obtained from the patient    History of Present Illness   Shilpa Miramontes is a 53-year-old female with a medical history of non-rheumatic aortic valve stenosis, undiagnosed COPD, former smoker, pre-diabetes, hypertension, hyperlipidemia, morbid obesity, cholecystectomy, and RSV infection in November 2023 who presented to the [facility] emergency department for the evaluation of shortness of breath.    In the past couple of days the patient had " symptoms of runny nose and cough.  She attributed to common colds but she has not been improving to the point she could not tolerate taking showers.  Patient has fallen twice today. The first time she fell was urinating and experienced shortness of breath when she stood up. She laid down and fell asleep. She sat up and called for her  and he found her on the ground. Patient sustained head trauma, but denies vomiting. She has been eating and drinking normally. Patient denies chest pain. No blood thinner use. She takes blood pressure medication daily. She used to smoke, but notes she doesn't anymore.      The patient reports feeling sick on and off since she was diagnosed with the RSV in November 2023.  About a week ago she had symptoms of sinusitis and she was treated with Augmentin.  Prior to that she was treated with another antibiotics and steroids for a possible pneumonia.      Past Medical History    No past medical history on file.    Past Surgical History   Past Surgical History:   Procedure Laterality Date    CHOLECYSTECTOMY  2004       Prior to Admission Medications   Prior to Admission Medications   Prescriptions Last Dose Informant Patient Reported? Taking?   albuterol (ALBUTEROL) 108 (90 BASE) MCG/ACT Inhaler 2/14/2024  No Yes   Sig: Inhale 2 puffs into the lungs every 4 hours as needed for shortness of breath / dyspnea   albuterol (VENTOLIN HFA) 108 (90 Base) MCG/ACT inhaler 2/14/2024  No Yes   Sig: Inhale 2 puffs into the lungs every 6 hours as needed for shortness of breath, wheezing or cough   amLODIPine (NORVASC) 5 MG tablet 2/14/2024  No Yes   Sig: Take 1 tablet (5 mg) by mouth daily   guaiFENesin-codeine (ROBITUSSIN AC) 100-10 MG/5ML SOLN solution 2/14/2024  No Yes   Sig: Take 5 mLs by mouth every 4 hours as needed for cough   lisinopril (ZESTRIL) 10 MG tablet 2/14/2024  No Yes   Sig: Take 1 tablet (10 mg) by mouth daily      Facility-Administered Medications: None        Review of Systems     The 10 point Review of Systems is negative other than noted in the HPI or here.     Physical Exam   Vital Signs: Temp: 98.2  F (36.8  C) Temp src: Temporal BP: 101/80 Pulse: 95   Resp: 24 SpO2: 93 % O2 Device: BiPAP/CPAP    Weight: 280 lbs 0 oz    GENERAL: Alert and oriented. Answers all questions appropriately while on BiPAP  HEAD: Atraumatic.  NECK: No rigidity  CV: no murmurs rubs or gallops. Tachycardic. Irregularly irregular rhythm.   PULM: CTAB with good aeration; no retractions, rales, rhonchi. Distant breath sounds bilaterally with wheezing. Inspiratory and expiratory wheezing. Shortness of breath when speaking.   ABD: Soft, nontender, nondistended, no guarding  DERM: No rash. Skin warm and dry  EXTREMITY: Moving all extremities without difficulty. No calf tenderness or peripheral edema  VASCULAR: Symmetric pulses bilaterally      Medical Decision Making       60 MINUTES SPENT BY ME on the date of service doing chart review, history, exam, documentation & further activities per the note.      Data   ------------------------- PAST 24 HR DATA REVIEWED -----------------------------------------------    I have personally reviewed the following data over the past 24 hrs:    9.0  \   13.0   / 279     141 105 17.9 /  99   4.3 23 0.85 \     Trop: N/A BNP: 1,647 (H)     TSH: 2.52 T4: N/A A1C: N/A       Imaging results reviewed over the past 24 hrs:   Recent Results (from the past 24 hour(s))   XR Chest Port 1 View    Narrative    EXAM: XR CHEST PORT 1 VIEW  LOCATION: [facility]  DATE: 2/14/2024    INDICATION: Shortness of breath  COMPARISON: 11/27/2023.      Impression    IMPRESSION: Patchy interstitial opacities may represent pulmonary edema and/or sequelae of infection. No significant effusions or pneumothorax. Cardiomegaly. No acute osseous findings.

## 2024-02-15 NOTE — PROGRESS NOTES
"A BiPAP of  12/6 @ 35% was applied to the pt via the mask for an increase in WOB and/or SOB.  The bridge of the nose looks good and remains intact. Pt is tolerating it well. Will continue to monitor and assess the pt's current respiratory status and needs.    /80   Pulse 95   Temp 98.2  F (36.8  C) (Temporal)   Resp 24   Ht 1.549 m (5' 1\")   Wt 127 kg (280 lb)   LMP 12/14/2020   SpO2 93%   BMI 52.91 kg/m      Marlyn Reinier, RT    "

## 2024-02-15 NOTE — PROGRESS NOTES
Medicine Progress Note - Hospitalist Service    Date of Admission:  2/14/2024    Primary Care Physician   [facility]  CONSULTANTS: cardiology    Assessment & Plan   Shilpa Miramontes is a 53-year-old female with a medical history of non-rheumatic aortic valve stenosis, undiagnosed COPD, former smoker, pre-diabetes, hypertension, hyperlipidemia, morbid obesity, cholecystectomy, and RSV infection in November 2023 who presented to the [facility] emergency department for the evaluation of shortness of breath and she was found to be in a new onset of atrial fibrillation.       Acute hypoxic respiratory failure due to Acute congstive heart failure, aortic stenosis, rapid atrial fibrillation, hypoventilation syndrome  Patient presented with worsening shortness of breath.  She has been having issues since she was diagnosed with RSV in 11/2023.  She states that she keeps getting sick with viral infections but I suspect this is more related to her heart.  In the emergency room her BNP was elevated, her chest x-ray is showing likely pulmonary edema, and her procalcitonin is low.  She was started empirically on antibiotics which I stopped.  I suspect that her heart failure is a combination of her uncontrolled atrial fibrillation (has had palpitations for some time), aortic stenosis, hypoventilation syndrome, and diet.  Patient in the emergency room was tachypneic with a respiratory rate in the 40s and required BiPAP.  She has been able to be weaned down to 6 L of oxygen via facemask.  In the ER CBC negative for leukocytosis (WBC at 9), and procalcitonin is at 0.05.  TSH 2.52 within normal limits.  Influenza, RSV, and COVID came back negative.  proBNP was elevated at 1,647, the patient received 40 mg of IV Lasix in the ED. Also 40 mg of oral prednisone given to patient in the ED due to wheezing.  EKG with Afib without ST elevation or depression.  Chest x-ray showed  patchy interstitial opacities concerning for pulmonary edema versus sequela of infection.  Patient has a known history of aortic stenosis in the past.  -Plan at this point is to change her IV Solu-Medrol to oral prednisone, will place the patient on 3 times a day IV Lasix and follow her ins and outs and daily weight.  At this point I do not think she has pneumonia so we will stop her antibiotics.  Will also get cardiology involved and repeat an echocardiogram to make sure that her aortic stenosis is not the leading candidate for her symptoms. She has seen [facility].  If diuresis help quickly likely can come off steroids.     Possible COPD  Patient has a history of tobacco abuse in the past.  Has had ongoing wheezing but this could be due to CHF as well.  She does think that inhalers have helped her.  Patient currently is on a steroid burst and will switch her to oral prednisone.  Has as needed DuoNebs. I think her wheezing is more due to fluid that to copd but will treat both for now.        New onset rapid atrial fibrillation, hypertension    Patient tells me that she has had palpitations for some time so this could have been going on for some time.  This is the first time though she has been found to have atrial fibrillation.  She was placed on diltiazem drip for rate control and her heart rate is actually under better control.  Her blood pressure is running on the lower side.  At this point we will stop her lisinopril and amlodipine.  Will add Lopressor and hopefully wean off of her diltiazem drip.  Given her timing, it is probably not safe to cardiovert her until she has been adequately anticoagulated.  I have placed her on a heparin drip.  Given her aortic stenosis I suspect this could be a contributing factor to her A-fib so would not be a candidate for DOAC.  TSH is normal.  Denies significant alcohol use.    Hyperlipidemia  Continue with PTA Atorvastatin     Prediabetes  Blood sugars have been  "122-162 in the setting of being on steroids.  Her hemoglobin A1c is 6.2.  At this point we will stop following Accu-Cheks          Hypokalemia/hypomagnesemia  With the patient's ongoing diuresis will place the patient on replacement protocol.    Morbidly obese, hypoventilation syndrome, suspected sleep apnea  Patient with a BMI of 598 as of 2/15/24.  Some of that is due to fluid.  Given her shortness of breath I suspect she has a component of hypoventilation syndrome.  Patient likely would benefit from an outpatient sleep study at some point.        Discussed plan of care with nursing, social work, case management      Diet: NPO for Medical/Clinical Reasons Except for: Meds, Ice Chips  switch to a low salt diet  DVT Prophylaxis: heparin drip, stop pneumo-boots  Xiao Catheter: Not present  Lines: None     Cardiac Monitoring: ACTIVE order. Indication: Tachyarrhythmias, acute (48 hours)    RESTRAINTS: not indicated  Code Status: Full Code         This document was created using voice recognition technology.  Please excuse any typographical errors that may have occurred.  Please call with any questions.          Clinically Significant Risk Factors Present on Admission                  # Hypertension: Home medication list includes antihypertensive(s)      # Severe Obesity: Estimated body mass index is 58.4 kg/m  as calculated from the following:    Height as of this encounter: 1.549 m (5' 1\").    Weight as of this encounter: 140.2 kg (309 lb 1.4 oz).              Disposition Plan      Expected Discharge Date: 02/16/2024             patient will be here for days.        Barrier to discharge: hypoxia, chf    Logan Ravi MD  Hospitalist Service  [facility]  Securely message with Sukhw[employer] (more info)  Text page via [employer] Paging/Directory   ______________________________________________________________________    Interval History   Patient is new to me.  Still requiring hide supplement of oxygen at " 6 L/min.  Still is having some shortness of breath.  Also has lower extremity edema.  Patient denies any current chest pain.  She tells me she has been having palpitations at times in the past for some time.  Is been worked up for a murmur in the past and found to have aortic stenosis by echocardiogram.  She is seen [facility] cardiology in the past.  Patient was diagnosed with RSV 11/2023 and she continues to think her symptoms are related to that and having recurrent viral infections.  I discussed with her that her symptoms may actually all be due to heart failure.      ROS: A comprehensive review of systems was negative except for items noted in the HPI.  Patient currently denies any fever, chills, sweats, nausea, vomiting, diarrhea,  or chest pain.       Physical Exam   Vital Signs: Temp: 97.9  F (36.6  C) Temp src: Oral BP: 116/84 Pulse: 85   Resp: 22 SpO2: 95 % O2 Device: Oxymask Oxygen Delivery: 6 LPM  Weight: 309 lbs 1.36 oz      General appearance: Patient is alert and oriented x3, no apparent distress, pleasant and conversing normally, speaking in full sentences, appear older than stated age  HEENT:   Mucous membranes are moist  NECK:  supple and thick  RESPIRATORY: Distant breath sounds, sound coarse but are quite distant, decreased at the bases, moderate  air movement, few wheezes  CARDIOVASCULAR: Irregularly irregular with a normal rate, 2/6 ASTRID over aorta but distance due to body habitus    GASTROINTESTINAL: obese,  Non-distended, non-tender, soft, bowel sounds present throughout,  NEUROLOGIC:  Cranial nerves II-XII intact, without any focal deficits, strength 5/5 throughout  EXTREMITIES:  Moves all extremities, no clubbing, cyanosis, has 2+ bilateral lower leg edema to below the knees  :  Dameon not present         Data     I have personally reviewed the following data over the past 24 hrs:    9.1  \   12.1   / 254     142 108 (H) 20.2 (H) /  151 (H)   4.9 22 0.97 (H) \     ALT: 64 (H) AST: 30  AP: 117 TBILI: 0.3   ALB: 3.8 TOT PROTEIN: 7.4 LIPASE: N/A     Trop: 12 BNP: 2,007 (H)     TSH: 2.52 T4: N/A A1C: 6.2 (H)     Procal: 0.05 CRP: N/A Lactic Acid: N/A       INR:  1.06 PTT:  33   D-dimer:  N/A Fibrinogen:  N/A       Imaging:   Results for orders placed or performed during the hospital encounter of 02/14/24   XR Chest Port 1 View    Narrative    EXAM: XR CHEST PORT 1 VIEW  LOCATION: [facility]  DATE: 2/14/2024    INDICATION: Shortness of breath  COMPARISON: 11/27/2023.      Impression    IMPRESSION: Patchy interstitial opacities may represent pulmonary edema and/or sequelae of infection. No significant effusions or pneumothorax. Cardiomegaly. No acute osseous findings.     Procedures: echo 2/15/24    I have personally have reviewed the patient's most up to date radiologic exams, EKG, labs, orders, and medications myself, previous echo with aortic stenosis, telemetry still showing a fib

## 2024-02-15 NOTE — PLAN OF CARE
[facility]    VSS on 6L oxymask, O2 sat ranging 90-94% Soft BP when admitted, improved overnight. New Afib in 150s - 160s when admitted, on diltiazem 15mg/hr, HR is now controlled. Pt would benefit from CPAP evaluation. Congested coughing at times. C/o some headache, tylenol given. No bumps or bruises noted on the head. Tele, Afib CVR.  NPO.     Plan; Cardiology consult, echo 2/15    Goal Outcome Evaluation:      Plan of Care Reviewed With: patient    Overall Patient Progress: improvingOverall Patient Progress: improving

## 2024-02-15 NOTE — CONSULTS
"SPIRITUAL HEALTH SERVICES - Consult Note  [facility] MS 3  Referral Source/Reason for Visit: [facility] consult to assess spiritual and emotional needs because patient responded \"Yes\" to the question in the admission assessment, \"Do you have any spiritual or Bahai beliefs that will affect your care?\"    Summary and Recommendations -  Reflectively listened as patient recounted her ongoing health issues and other family struggles.    Plan: [facility] remains available.    Richard Martinez M.Div.      Pager: [phone]     Assessment    Saw pt Shilpa Miramontes per [facility] consult to assess spiritual and emotional needs because patient responded \"Yes\" to the question in the admission assessment, \"Do you have any spiritual or Bahai beliefs that will affect your care?\".    Patient/Family Understanding of Illness and Goals of Care - Pt understands that she is at  due to shortness of breath.    Distress and Loss - Pt missing family and home.    Strengths, Coping, and Resources - She leans on her  for emotional support.    Meaning, Beliefs, and Spirituality - Pt. grow up in Confucianism [place], but she respects all religions. Pt welcomed prayer.   "

## 2024-02-15 NOTE — ED NOTES
[facility]  ED Nurse Handoff Report    ED Chief complaint: Shortness of Breath  . ED Diagnosis:   Final diagnoses:   None       Allergies:   Allergies   Allergen Reactions    Morphine      rash    Fluoxetine Rash    Percocet [Oxycodone-Acetaminophen] Rash       Code Status: Full Code    Activity level - Baseline/Home:  independent.  Activity Level - Current:   assist of 1.   Lift room needed: No.   Bariatric: No   Needed: No   Isolation: No.   Infection: Not Applicable.     Respiratory status: BiPap    Vital Signs (within 30 minutes):   Vitals:    02/14/24 1800 02/14/24 1820 02/14/24 1839 02/14/24 1840   BP: 111/65 109/71  113/78   Pulse: 90 101 97 93   Resp:  (!) 40     Temp:       TempSrc:       SpO2: 93% 90% 96%    Weight:       Height:           Cardiac Rhythm:  rapid afib      Pain level:  denies  Patient confused: No.   Patient Falls Risk: patient and family education.   Elimination Status:  has not voided      Patient Report - Initial Complaint: SOB   Focused Assessment:  Shilpa Miramontes is a 53 year old female with no history of afib presenting with shortness of breath for the past week that has gotten worse over the past couple days. She initially attributed this to getting over a cold. She was experiencing rhinorrhea and cough. She feels that she is susceptible to illness, noting she recently had RSV. Patient has fallen twice today. The first time she fell was urinating and experienced shortness of breath when she stood up. She laid down and fell asleep. She sat up and called for her  and he found her on the ground. Patient sustained head trauma, but denies vomiting. She has been eating and drinking normally. Patient denies chest pain. No blood thinner use. She takes blood pressure medication daily. She used to smoke, but notes she doesn't anymore.      Abnormal Results:   Labs Ordered and Resulted from Time of ED Arrival to Time of ED Departure   CBC WITH  PLATELETS AND DIFFERENTIAL - Abnormal       Result Value    WBC Count 9.0      RBC Count 4.43      Hemoglobin 13.0      Hematocrit 41.5      MCV 94      MCH 29.3      MCHC 31.3 (*)     RDW 13.6      Platelet Count 279      % Neutrophils 72      % Lymphocytes 18      % Monocytes 7      % Eosinophils 2      % Basophils 1      % Immature Granulocytes 0      NRBCs per 100 WBC 0      Absolute Neutrophils 6.5      Absolute Lymphocytes 1.6      Absolute Monocytes 0.6      Absolute Eosinophils 0.2      Absolute Basophils 0.1      Absolute Immature Granulocytes 0.0      Absolute NRBCs 0.0     MAGNESIUM - Normal    Magnesium 2.0     BASIC METABOLIC PANEL - Normal    Sodium 141      Potassium 4.3      Chloride 105      Carbon Dioxide (CO2) 23      Anion Gap 13      Urea Nitrogen 17.9      Creatinine 0.85      GFR Estimate 82      Calcium 8.8      Glucose 99     INFLUENZA A/B, RSV, & SARS-COV2 PCR - Normal    Influenza A PCR Negative      Influenza B PCR Negative      RSV PCR Negative      SARS CoV2 PCR Negative     TSH WITH FREE T4 REFLEX - Normal    TSH 2.52     NT PROBNP INPATIENT        XR Chest Port 1 View   Final Result   IMPRESSION: Patchy interstitial opacities may represent pulmonary edema and/or sequelae of infection. No significant effusions or pneumothorax. Cardiomegaly. No acute osseous findings.          Treatments provided: IVF, labs, Diltiazem given x2 for rapid afib, nebs x2  Family Comments: N/A  OBS brochure/video discussed/provided to patient:  No  ED Medications:   Medications   ipratropium - albuterol 0.5 mg/2.5 mg/3 mL (DUONEB) neb solution 3 mL (3 mLs Nebulization $Given 2/14/24 1818)   predniSONE (DELTASONE) tablet 40 mg (40 mg Oral $Given 2/14/24 1619)   diltiazem (CARDIZEM) injection 25 mg (25 mg Intravenous $Given 2/14/24 1635)   lactated ringers BOLUS 1,000 mL (1,000 mLs Intravenous $New Bag 2/14/24 1702)   diltiazem (CARDIZEM) injection 30 mg (30 mg Intravenous $Given 2/14/24 1742)       Drips  infusing:  No  For the majority of the shift this patient was Green.   Interventions performed were N/A.    Sepsis treatment initiated: No    Cares/treatment/interventions/medications to be completed following ED care: per hospitalist    ED Nurse Name: Chanelle Colon RN  6:45 PM    RECEIVING UNIT ED HANDOFF REVIEW    Above ED Nurse Handoff Report was reviewed: Yes  Reviewed by: Jean Harrison RN on February 14, 2024 at 8:31 PM

## 2024-02-16 LAB
ANION GAP SERPL CALCULATED.3IONS-SCNC: 14 MMOL/L (ref 7–15)
ATRIAL RATE - MUSE: 153 BPM
BUN SERPL-MCNC: 26.7 MG/DL (ref 6–20)
CALCIUM SERPL-MCNC: 8.8 MG/DL (ref 8.6–10)
CHLORIDE SERPL-SCNC: 105 MMOL/L (ref 98–107)
CREAT SERPL-MCNC: 0.97 MG/DL (ref 0.51–0.95)
DEPRECATED HCO3 PLAS-SCNC: 24 MMOL/L (ref 22–29)
DIASTOLIC BLOOD PRESSURE - MUSE: NORMAL MMHG
EGFRCR SERPLBLD CKD-EPI 2021: 70 ML/MIN/1.73M2
GLUCOSE SERPL-MCNC: 127 MG/DL (ref 70–99)
INTERPRETATION ECG - MUSE: NORMAL
MAGNESIUM SERPL-MCNC: 2.1 MG/DL (ref 1.7–2.3)
P AXIS - MUSE: NORMAL DEGREES
POTASSIUM SERPL-SCNC: 4.6 MMOL/L (ref 3.4–5.3)
PR INTERVAL - MUSE: NORMAL MS
QRS DURATION - MUSE: 74 MS
QT - MUSE: 288 MS
QTC - MUSE: 467 MS
R AXIS - MUSE: 83 DEGREES
SODIUM SERPL-SCNC: 143 MMOL/L (ref 135–145)
SYSTOLIC BLOOD PRESSURE - MUSE: NORMAL MMHG
T AXIS - MUSE: -37 DEGREES
VENTRICULAR RATE- MUSE: 158 BPM

## 2024-02-16 PROCEDURE — 250N000013 HC RX MED GY IP 250 OP 250 PS 637: Performed by: HOSPITALIST

## 2024-02-16 PROCEDURE — 250N000013 HC RX MED GY IP 250 OP 250 PS 637: Performed by: INTERNAL MEDICINE

## 2024-02-16 PROCEDURE — 250N000011 HC RX IP 250 OP 636: Performed by: NURSE PRACTITIONER

## 2024-02-16 PROCEDURE — 80048 BASIC METABOLIC PNL TOTAL CA: CPT | Performed by: INTERNAL MEDICINE

## 2024-02-16 PROCEDURE — 36415 COLL VENOUS BLD VENIPUNCTURE: CPT | Performed by: INTERNAL MEDICINE

## 2024-02-16 PROCEDURE — 250N000011 HC RX IP 250 OP 636: Performed by: INTERNAL MEDICINE

## 2024-02-16 PROCEDURE — 99232 SBSQ HOSP IP/OBS MODERATE 35: CPT | Mod: FS | Performed by: NURSE PRACTITIONER

## 2024-02-16 PROCEDURE — 120N000001 HC R&B MED SURG/OB

## 2024-02-16 PROCEDURE — 250N000013 HC RX MED GY IP 250 OP 250 PS 637: Performed by: NURSE PRACTITIONER

## 2024-02-16 PROCEDURE — 83735 ASSAY OF MAGNESIUM: CPT | Performed by: INTERNAL MEDICINE

## 2024-02-16 PROCEDURE — 99233 SBSQ HOSP IP/OBS HIGH 50: CPT | Performed by: INTERNAL MEDICINE

## 2024-02-16 RX ORDER — DILTIAZEM HCL/D5W 125 MG/125
5-15 PLASTIC BAG, INJECTION (ML) INTRAVENOUS CONTINUOUS
Status: DISCONTINUED | OUTPATIENT
Start: 2024-02-16 | End: 2024-02-17

## 2024-02-16 RX ORDER — FUROSEMIDE 10 MG/ML
40 INJECTION INTRAMUSCULAR; INTRAVENOUS 3 TIMES DAILY
Status: DISCONTINUED | OUTPATIENT
Start: 2024-02-16 | End: 2024-02-18

## 2024-02-16 RX ORDER — NITROGLYCERIN 0.4 MG/1
0.4 TABLET SUBLINGUAL EVERY 5 MIN PRN
Status: DISCONTINUED | OUTPATIENT
Start: 2024-02-16 | End: 2024-02-16

## 2024-02-16 RX ORDER — DILTIAZEM HYDROCHLORIDE 30 MG/1
30 TABLET, FILM COATED ORAL ONCE
Status: COMPLETED | OUTPATIENT
Start: 2024-02-16 | End: 2024-02-16

## 2024-02-16 RX ORDER — METOPROLOL TARTRATE 25 MG/1
25 TABLET, FILM COATED ORAL 2 TIMES DAILY
Status: DISCONTINUED | OUTPATIENT
Start: 2024-02-16 | End: 2024-02-20

## 2024-02-16 RX ORDER — DILTIAZEM HYDROCHLORIDE 30 MG/1
60 TABLET, FILM COATED ORAL EVERY 6 HOURS SCHEDULED
Status: DISCONTINUED | OUTPATIENT
Start: 2024-02-16 | End: 2024-02-17

## 2024-02-16 RX ORDER — IBUPROFEN 600 MG/1
600 TABLET, FILM COATED ORAL 2 TIMES DAILY PRN
Status: DISCONTINUED | OUTPATIENT
Start: 2024-02-16 | End: 2024-02-21 | Stop reason: HOSPADM

## 2024-02-16 RX ORDER — DILTIAZEM HYDROCHLORIDE 30 MG/1
30 TABLET, FILM COATED ORAL EVERY 6 HOURS SCHEDULED
Status: DISCONTINUED | OUTPATIENT
Start: 2024-02-16 | End: 2024-02-16

## 2024-02-16 RX ORDER — LIDOCAINE 40 MG/G
CREAM TOPICAL
Status: DISCONTINUED | OUTPATIENT
Start: 2024-02-16 | End: 2024-02-16

## 2024-02-16 RX ADMIN — FUROSEMIDE 40 MG: 10 INJECTION, SOLUTION INTRAMUSCULAR; INTRAVENOUS at 15:57

## 2024-02-16 RX ADMIN — METOPROLOL TARTRATE 25 MG: 25 TABLET, FILM COATED ORAL at 17:37

## 2024-02-16 RX ADMIN — ACETAMINOPHEN 650 MG: 325 TABLET, FILM COATED ORAL at 16:32

## 2024-02-16 RX ADMIN — Medication 5 MG/HR: at 10:21

## 2024-02-16 RX ADMIN — FUROSEMIDE 40 MG: 10 INJECTION, SOLUTION INTRAMUSCULAR; INTRAVENOUS at 21:09

## 2024-02-16 RX ADMIN — APIXABAN 5 MG: 5 TABLET, FILM COATED ORAL at 21:08

## 2024-02-16 RX ADMIN — DILTIAZEM HYDROCHLORIDE 30 MG: 30 TABLET, FILM COATED ORAL at 05:05

## 2024-02-16 RX ADMIN — DILTIAZEM HYDROCHLORIDE 30 MG: 30 TABLET, FILM COATED ORAL at 08:55

## 2024-02-16 RX ADMIN — FUROSEMIDE 40 MG: 10 INJECTION, SOLUTION INTRAMUSCULAR; INTRAVENOUS at 08:55

## 2024-02-16 RX ADMIN — ACETAMINOPHEN 650 MG: 325 TABLET, FILM COATED ORAL at 05:06

## 2024-02-16 RX ADMIN — APIXABAN 5 MG: 5 TABLET, FILM COATED ORAL at 08:55

## 2024-02-16 RX ADMIN — DILTIAZEM HYDROCHLORIDE 30 MG: 30 TABLET, FILM COATED ORAL at 01:04

## 2024-02-16 RX ADMIN — ACETAMINOPHEN 650 MG: 325 TABLET, FILM COATED ORAL at 12:06

## 2024-02-16 RX ADMIN — IBUPROFEN 600 MG: 600 TABLET, FILM COATED ORAL at 21:08

## 2024-02-16 RX ADMIN — ATORVASTATIN CALCIUM 10 MG: 10 TABLET, FILM COATED ORAL at 21:08

## 2024-02-16 ASSESSMENT — ACTIVITIES OF DAILY LIVING (ADL)
ADLS_ACUITY_SCORE: 28
ADLS_ACUITY_SCORE: 28
ADLS_ACUITY_SCORE: 27
ADLS_ACUITY_SCORE: 28
ADLS_ACUITY_SCORE: 27
ADLS_ACUITY_SCORE: 28
ADLS_ACUITY_SCORE: 27
ADLS_ACUITY_SCORE: 28

## 2024-02-16 NOTE — PROGRESS NOTES
[facility]  Cardiology Progress Note    Date of Service (when I saw the patient): 02/16/2024       Assessment & Plan   Shilpa Miramontes is a 53 year old female who was admitted on 2/14/2024.     1.  : Acute diastolic heart failure  - 20 lbs up over the last month. Baseline weight 280 lbs, Admission weight 310 lb  -NT pro BNP 2007  - Echocardiogram showed a normal EF 55-60%, mildly dilated left atrium, mild mitral regurgitation and mild aortic stenosis.   - Initiated on IV lasix 40 mg TID. Minimal output.     2.  : Atrial Fibrillation   - Currently in A-fib with sub optimal rates  - Initiated on Diltiazem drip,  transitioned to PO diltiazem 60 mg every 6 hrs.   - CHADsVASc - 3, initiated on Eliquis for stroke prevention   - TSH - 2.52  -  K 4.6, Mag 2.1    3.  : Mild aortic stenosis - mean pressure gradient of 17 mmHg, MINI 1.3 cm2  4.  : Hypertension   - PTA amlodipine and lisinopril d/t episodes of hypotension  5.  : COPD   - Requiring Oxygen mask 8L  - PRN nebulizer therapy.  6.  : Strong suspicion for sleep apnea   7.  : Morbid Obesity     Plan   Currently in A-fib with RVR,  restart diltiazem gtt to keep HR < 100 bpm.   Eliquis for stroke prevention   Shortness of breath minimally improved. On 8L oxygen mask, PRN neb therapy. Minimal diuresis on IV Lasix, still volume up ~ 20 lbs from baseline weight (280 lb). Likely need IV diuresis for a few days.   Strict I/O's, daily standing weight, low sodium diet.   Strong suspicion for sleep apnea, will need referral to sleep medicine outpatient.     JOYCE Alegria CNP  Text Page  (M-F, 7:30 am - 4:00 pm)    Interval History   Shortness of breath minimally improved, requiring oxygen mask 8 L. Minimal diuresis on IV lasix. A-fib with RVR, diltiazem drip restarted. On Eliquis.     Physical Exam   Temp: 98.1  F (36.7  C) Temp src: Oral BP: (!) 118/103 Pulse: 111   Resp: 22 SpO2: 95 % O2 Device: Oxymask Oxygen Delivery: 8 LPM  Vitals:     02/14/24 2133 02/15/24 0603 02/16/24 0645   Weight: 140.6 kg (310 lb) 140.2 kg (309 lb 1.4 oz) 140.1 kg (308 lb 12.8 oz)     Vital Signs with Ranges  Temp:  [96.6  F (35.9  C)-98.6  F (37  C)] 98.1  F (36.7  C)  Pulse:  [] 111  Resp:  [18-26] 22  BP: ()/() 118/103  SpO2:  [93 %-98 %] 95 %  I/O last 3 completed shifts:  In: 720 [P.O.:720]  Out: 600 [Urine:600]    GEN:  In general, this is a morbidly obese female on Oxygen mask 8L.  Patient ambulatory.  HEENT:  Pupils equal, round. Sclerae nonicteric. Clear oropharynx. Mucous membranes moist.  NECK: Supple, no masses appreciated. Trachea midline. Difficult to assess JVD d/t body habitus.  C/V:  Irregular rate and rhythm, systolic murmur, no rub or gallop. No S3 or RV heave.   RESP: Respirations are labored. Coarse breath sounds bilaterally. Inspiratory and expiratory wheezing.   GI: Obese Abdomen   EXTREM: 1+ bilateral LE edema. No cyanosis or clubbing.  NEURO: Alert and oriented, cooperative.   PSYCH: Normal affect.  SKIN: Warm and dry. No rashes or petechiae appreciated.       Medications    dilTIAZem        apixaban ANTICOAGULANT  5 mg Oral BID    atorvastatin  10 mg Oral QPM    [Held by provider] diltiazem  60 mg Oral Q6H SANTI    furosemide  40 mg Intravenous TID    sodium chloride (PF)  3 mL Intracatheter Q8H       Data   Reviewed       I spent > 30 minutes face-to-face and/or coordinating care. Over 50% of our time on the unit was spent counseling the patient and/or coordinating care        Nuzhat eBrmudez, JOYCE CNP 2/16/2024

## 2024-02-16 NOTE — PLAN OF CARE
Patient back in afib -150's. /86. Cardiology notes from today reviewed. Will re-start diltiazem ggt, go up on scheduled diltiazem from 30mg q6 --> 60mg q6, try to wean off ggt as able.    Candido Rankin MD

## 2024-02-16 NOTE — PROGRESS NOTES
[facility]    Medicine Progress Note - Hospitalist Service    Date of Admission:  2/14/2024    Primary Care Physician   [facility]  CONSULTANTS: cardiology    Assessment & Plan   Shilpa Miramontes is a 53-year-old female with a medical history of non-rheumatic aortic valve stenosis, undiagnosed COPD, former smoker, pre-diabetes, hypertension, hyperlipidemia, morbid obesity, cholecystectomy, and RSV infection in November 2023 who presented to the [facility] emergency department for the evaluation of shortness of breath and she was found to be in a new onset of atrial fibrillation.       Acute hypoxic respiratory failure due to Acute congstive heart failure, aortic stenosis, rapid atrial fibrillation, hypoventilation syndrome  Patient presented with worsening shortness of breath.  She has been having issues since she was diagnosed with RSV in 11/2023.  She states that she keeps getting sick with viral infections but I suspect this is more related to her heart and possible heart failure.  In the emergency room her BNP was elevated, her chest x-ray showed likely pulmonary edema, and her procalcitonin is low.  She was started empirically on antibiotics which I stopped.  I suspect that her heart failure is a combination of her uncontrolled atrial fibrillation (has had palpitations for some time), aortic stenosis (less likely as not severe), hypoventilation syndrome, and diet.  Patient in the emergency room was tachypneic with a respiratory rate in the 40s and required BiPAP.  She has been able to be weaned down to 6 L of oxygen via facemask.  In the ER CBC negative for leukocytosis (WBC at 9), and procalcitonin is at 0.05.  TSH 2.52 within normal limits.  Influenza, RSV, and COVID came back negative.  proBNP was elevated at 1,647, the patient received 40 mg of IV Lasix in the ED. Also 40 mg of oral prednisone given to patient in the ED due to wheezing.  EKG with Afib  without ST elevation or depression.  Chest x-ray showed patchy interstitial opacities concerning for pulmonary edema versus sequela of infection.  Patient has a known history of aortic stenosis in the past.  --Currently, on steroids for wheezing but I doubt COPD at this point and the wheezing is better with congstive heart failure treatement.  Will stop her steroids.  Continue on lasix tid.  Weight has not changed but patient states her edema is better, she is feeling better and has been urinating.  Not sure the accuracy of her in/outs.    At this point I do not think she has pneumonia so off of antibiotics.  Appreciate cardiology input.  Echo done 2/15/24, actually showing good ejection fraction of 55 to 60% with only mild aortic stenosis.  She has seen [facility].  Will be stopping the patient's steroids at this point.  Still requiring 6 L of oxygen and will wean this as able.  Patient is feeling much better.    Possible COPD  Patient has a history of tobacco abuse in the past.  Has had ongoing wheezing but this could be due to CHF as well.  She does think that inhalers have helped her.  Patient currently is on a steroid burst but do not think this is actually treating anything at this point.  I think her symptoms are due to her heart failure.  Will stop the steroids.  Has as needed DuoNebs.        New onset rapid atrial fibrillation, hypertension    Patient tells me that she has had palpitations for some time so this could have been going on for some time.  This is the first time though she has been found to have atrial fibrillation.  She was placed on diltiazem drip for rate control and her heart rate is actually under better control.  Her blood pressure is running on the lower side.  At this point we stopped her lisinopril and amlodipine to get blood pressure room.  Cardiology placed the patient on oral diltiazem, it has been a struggle to try and balance her heart rate and her blood pressure.   "Has Lopressor IV as needed.  Given the lack of ongoing wheezing could try and resume a beta-blocker or digoxin going forward.  Patient was placed on Eliquis by cardiology.  Planning cardioversion in 3 to 4 weeks..  TSH is normal.  Denies significant alcohol use.  I have increased patient's diltiazem to 60 mg 4 times a day will need to watch her blood pressure.  Would accept a blood pressure around 90 if she is asymptomatic.    Hyperlipidemia  Continue with PTA Atorvastatin     Prediabetes  Blood sugars have been 122-162 in the setting of being on steroids.  Her hemoglobin A1c is 6.2.  At this point we will stop following Accu-Cheks          Hypokalemia/hypomagnesemia  With the patient's ongoing diuresis will place the patient on replacement protocol.    Morbidly obese, hypoventilation syndrome, suspected sleep apnea  Patient with a BMI of 58.4 as of 2/16/24.  Some of that is due to fluid.  Given her shortness of breath I suspect she has a component of hypoventilation syndrome.  Patient likely would benefit from an outpatient sleep study at some point.        Discussed plan of care with nursing, social work, case management, cardiology notes reviewed      Diet: Low Saturated Fat Na <2400 mg  switch to a low salt diet  DVT Prophylaxis: heparin drip, stop pneumo-boots  Xiao Catheter: Not present  Lines: None     Cardiac Monitoring: ACTIVE order. Indication: Tachyarrhythmias, acute (48 hours)    RESTRAINTS: not indicated  Code Status: Full Code         This document was created using voice recognition technology.  Please excuse any typographical errors that may have occurred.  Please call with any questions.          Clinically Significant Risk Factors                         # Severe Obesity: Estimated body mass index is 58.35 kg/m  as calculated from the following:    Height as of this encounter: 1.549 m (5' 1\").    Weight as of this encounter: 140.1 kg (308 lb 12.8 oz)., PRESENT ON ADMISSION            Disposition " Plan     Expected Discharge Date: 02/18/2024             patient will be here for days. Needs to wean oxygen       Barrier to discharge: hypoxia, chf    Logan Ravi MD  Hospitalist Service  [facility]  Securely message with [employer] (more info)  Text page via [employer] Paging/Directory   ______________________________________________________________________    Interval History   Patient is doing better with diuresis.  She says she has been urinating well.  States that her shortness of breath seems better as well.  Overnight has had issues with rapid A-fib along with some hypotension.  Her medications were adjusted on and off of diltiazem drip and had her oral diltiazem adjusted as well.  Patient's edema is better as well.  Denies any sputum production.  Has no fever, chills, sweats.  Overall is just doing better.    ROS: A comprehensive review of systems was negative except for items noted in the HPI.  Patient currently denies any fever, chills, sweats, nausea, vomiting, diarrhea,  or chest pain.       Physical Exam   Vital Signs: Temp: (!) 96.6  F (35.9  C) Temp src: Axillary BP: 118/88 Pulse: 115   Resp: 19 SpO2: 97 % O2 Device: Oxymask Oxygen Delivery: 6 LPM  Weight: 308 lbs 12.8 oz    Exam seems slightly improved from yesterday  General appearance: Patient is alert and oriented x3, no apparent distress, does not appear to be in significant respiratory distress, pleasant and conversing normally, speaking in full sentences, appear older than stated age  HEENT:   Mucous membranes are moist  RESPIRATORY: Distant breath sounds, sound coarse but are quite distant, no current wheezes, decreased at the bases, moderate  air movement  CARDIOVASCULAR: Irregularly irregular with tachycardia, 2/6 ASTRID over aorta but distance due to body habitus    GASTROINTESTINAL: obese,  Non-distended, non-tender, soft, bowel sounds present throughout,  NEUROLOGIC:  Cranial nerves II-XII intact, without any focal  deficits, strength 5/5 throughout  EXTREMITIES:  Moves all extremities, no clubbing, cyanosis, has 2+ bilateral lower leg edema to shins, her edema overall seems better today than yesterday  CIARA:  Dameon not present         Data     I have personally reviewed the following data over the past 24 hrs:    N/A  \   N/A   / N/A     143 105 26.7 (H) /  127 (H)   4.6 24 0.97 (H) \       Imaging:   Results for orders placed or performed during the hospital encounter of 02/14/24   XR Chest Port 1 View    Narrative    EXAM: XR CHEST PORT 1 VIEW  LOCATION: [facility]  DATE: 2/14/2024    INDICATION: Shortness of breath  COMPARISON: 11/27/2023.      Impression    IMPRESSION: Patchy interstitial opacities may represent pulmonary edema and/or sequelae of infection. No significant effusions or pneumothorax. Cardiomegaly. No acute osseous findings.     Procedures: echo 2/15/24    I have personally have reviewed the patient's most up to date labs, orders, and medications myself, echo, telemetry still showing rapid a fib

## 2024-02-16 NOTE — PROVIDER NOTIFICATION
Dr. Ravi messaged via [employer]: pt hr 90's up to 150's when up to bathroom. she is on 10mh/hr of dilt. b/p 111/91. metoprolol ordered to be given bid . first dose at 2100. would it be ok to give that now? Dr. Ravi responded it was ok to give now.

## 2024-02-16 NOTE — PLAN OF CARE
"/77 (BP Location: Left arm)   Pulse (!) 137   Temp 98.6  F (37  C) (Oral)   Resp 26   Ht 1.549 m (5' 1\")   Wt 140.2 kg (309 lb 1.4 oz)   LMP 12/14/2020   SpO2 95%   BMI 58.40 kg/m      Neuro: a/o x4  Cardiac: tele- afib rvr- IV metoprolol given  Lungs: 6L oxymask  GI: WNL  : WNL  Pain: denies  IV: saline locked x2  Meds: cardizem, eliquis, lasix  Labs/tests: K 4.9, Mg 3.8  Diet: low Na, low sat fat  Activity: SBA  Misc: K and Mg protocol.   Plan: Currently resting in bed, able to make need known.     "

## 2024-02-16 NOTE — PROGRESS NOTES
Cross Cover    Called for BP 86/68 and due to for furosemide 40 mg IV and diltiazem 60 mg  Here with AF with RVR and CHF with pulmonary edema requiring oxygen supplementation     Earlier last night had breakthrough RVR despite oral agents and dilt gtt resumed and oral dilt increased from 30 mg to 60 mg q6    Respiratory status looks stable at 6 L  Plus mean to give furosemide at [place]    Decreased oral dilt back to 30 mg and d/w RN to give it - MAP is 74 and high risk of decompensation   Changed furosemide to tid starting in am which would still give him his IV diuretic at 10 pm  Strongly consider readjustment of diuretics so that she might be able to get some sleep

## 2024-02-16 NOTE — PLAN OF CARE
"Goal Outcome Evaluation:    Vitals: /63 (BP Location: Left arm, Patient Position: Semi-Jennings's, Cuff Size: Adult Regular)   Pulse 90   Temp 98.4  F (36.9  C) (Oral)   Resp 22   Ht 1.549 m (5' 1\")   Wt 140.2 kg (309 lb 1.4 oz)   LMP 12/14/2020   SpO2 98%   BMI 58.40 kg/m      Primary DX: Dyspnea / New A-Fib  Orientation: A&OX4. Speech clear. Uses call light appropriately.   Pertinent: BP was low so PO Dilt was decreased back to 30 MG Q6H.   Pain: Denies  Anticoagulant: Eliquis  Neuro: Intact  Respiratory: LS diminished w/ exp wheezing on L .  Cardiac/Tele: A-Fib RVR  Bowel Sounds: +X4Q. ABD soft, non-tender.   GI/: Continent urine, no bm this shift.   Skin: See flow sheet  LDA: 2 R PIV SL  Protocols: K and Mag   Diet: 2 Gram Sodium  Activity: SBA  Followed By/Consults: Cards,   Plan of care was reviewed with: Patient  Overall patient progress stable this shift: Yes  Bed alarm on: yes  Safety checks completed: Yes  Plan: Continue current POC    Cares are clustered at night to promote rest and help reduce falls for the patient.                           "

## 2024-02-17 LAB
ANION GAP SERPL CALCULATED.3IONS-SCNC: 12 MMOL/L (ref 7–15)
BUN SERPL-MCNC: 32.8 MG/DL (ref 6–20)
CALCIUM SERPL-MCNC: 8.3 MG/DL (ref 8.6–10)
CHLORIDE SERPL-SCNC: 101 MMOL/L (ref 98–107)
CREAT SERPL-MCNC: 1.17 MG/DL (ref 0.51–0.95)
DEPRECATED HCO3 PLAS-SCNC: 29 MMOL/L (ref 22–29)
EGFRCR SERPLBLD CKD-EPI 2021: 56 ML/MIN/1.73M2
GLUCOSE SERPL-MCNC: 118 MG/DL (ref 70–99)
MAGNESIUM SERPL-MCNC: 2 MG/DL (ref 1.7–2.3)
PLATELET # BLD AUTO: 238 10E3/UL (ref 150–450)
POTASSIUM SERPL-SCNC: 4.3 MMOL/L (ref 3.4–5.3)
SODIUM SERPL-SCNC: 142 MMOL/L (ref 135–145)

## 2024-02-17 PROCEDURE — 83735 ASSAY OF MAGNESIUM: CPT | Performed by: INTERNAL MEDICINE

## 2024-02-17 PROCEDURE — 85049 AUTOMATED PLATELET COUNT: CPT | Performed by: NURSE PRACTITIONER

## 2024-02-17 PROCEDURE — 99233 SBSQ HOSP IP/OBS HIGH 50: CPT | Performed by: INTERNAL MEDICINE

## 2024-02-17 PROCEDURE — 120N000013 HC R&B IMCU

## 2024-02-17 PROCEDURE — 250N000013 HC RX MED GY IP 250 OP 250 PS 637: Performed by: INTERNAL MEDICINE

## 2024-02-17 PROCEDURE — 80048 BASIC METABOLIC PNL TOTAL CA: CPT | Performed by: INTERNAL MEDICINE

## 2024-02-17 PROCEDURE — 250N000013 HC RX MED GY IP 250 OP 250 PS 637: Performed by: NURSE PRACTITIONER

## 2024-02-17 PROCEDURE — 250N000009 HC RX 250: Performed by: INTERNAL MEDICINE

## 2024-02-17 PROCEDURE — 36415 COLL VENOUS BLD VENIPUNCTURE: CPT | Performed by: INTERNAL MEDICINE

## 2024-02-17 PROCEDURE — 250N000011 HC RX IP 250 OP 636: Performed by: INTERNAL MEDICINE

## 2024-02-17 RX ORDER — DILTIAZEM HYDROCHLORIDE 30 MG/1
30 TABLET, FILM COATED ORAL EVERY 6 HOURS SCHEDULED
Status: DISCONTINUED | OUTPATIENT
Start: 2024-02-17 | End: 2024-02-20

## 2024-02-17 RX ADMIN — FUROSEMIDE 40 MG: 10 INJECTION, SOLUTION INTRAMUSCULAR; INTRAVENOUS at 22:28

## 2024-02-17 RX ADMIN — IBUPROFEN 600 MG: 600 TABLET, FILM COATED ORAL at 07:54

## 2024-02-17 RX ADMIN — METOPROLOL TARTRATE 10 MG: 5 INJECTION INTRAVENOUS at 19:45

## 2024-02-17 RX ADMIN — DILTIAZEM HYDROCHLORIDE 30 MG: 30 TABLET, FILM COATED ORAL at 18:34

## 2024-02-17 RX ADMIN — APIXABAN 5 MG: 5 TABLET, FILM COATED ORAL at 20:45

## 2024-02-17 RX ADMIN — METOPROLOL TARTRATE 25 MG: 25 TABLET, FILM COATED ORAL at 07:54

## 2024-02-17 RX ADMIN — ATORVASTATIN CALCIUM 10 MG: 10 TABLET, FILM COATED ORAL at 20:49

## 2024-02-17 RX ADMIN — DILTIAZEM HYDROCHLORIDE 30 MG: 30 TABLET, FILM COATED ORAL at 23:33

## 2024-02-17 RX ADMIN — METOPROLOL TARTRATE 25 MG: 25 TABLET, FILM COATED ORAL at 17:15

## 2024-02-17 RX ADMIN — FUROSEMIDE 40 MG: 10 INJECTION, SOLUTION INTRAMUSCULAR; INTRAVENOUS at 16:35

## 2024-02-17 RX ADMIN — IBUPROFEN 600 MG: 600 TABLET, FILM COATED ORAL at 18:34

## 2024-02-17 RX ADMIN — DILTIAZEM HYDROCHLORIDE 30 MG: 30 TABLET, FILM COATED ORAL at 11:51

## 2024-02-17 RX ADMIN — APIXABAN 5 MG: 5 TABLET, FILM COATED ORAL at 07:54

## 2024-02-17 RX ADMIN — FUROSEMIDE 40 MG: 10 INJECTION, SOLUTION INTRAMUSCULAR; INTRAVENOUS at 09:52

## 2024-02-17 ASSESSMENT — ACTIVITIES OF DAILY LIVING (ADL)
ADLS_ACUITY_SCORE: 30
ADLS_ACUITY_SCORE: 28
ADLS_ACUITY_SCORE: 30
ADLS_ACUITY_SCORE: 28
ADLS_ACUITY_SCORE: 28
ADLS_ACUITY_SCORE: 30
ADLS_ACUITY_SCORE: 30
ADLS_ACUITY_SCORE: 28
ADLS_ACUITY_SCORE: 30
ADLS_ACUITY_SCORE: 28
ADLS_ACUITY_SCORE: 30
ADLS_ACUITY_SCORE: 30

## 2024-02-17 NOTE — PLAN OF CARE
Cared for 1900-0730    Activity: SBA. Bed alarm on, call light within reach  Neuro:  A&Ox4, intact. Denies numbness/tingling, n/v  Cardiac: on tele, afib CVR. Soft BPs. On metoprolol BID and eliquis. Denies SOB/chest pain.  Resp: on 4L Oxymask, with O2s in the 90s. Infrequent dry cough and LS coarse. Dyspneic with activity. Lasix TID. Has PRN DuoNebs  GI/: WNL  Diet: Low Fat/Na diet  Skin: Trace edema on LE  LDAs: R PIV, SL  Pain: 5/10 headache pain  PRNs: ibuprofen x1 given  Labs: K/Mg, Plt 238    Consults: Cardiology    Major Shift Events: Provider paged about 4 second paused and 20 sec HR in the 30s. Provider held dilt gtt.     Plan: Continue with plan of care  For vital signs and complete assessments, please see documentation under flowsheets.    Xena Porter RN      Goal Outcome Evaluation: unchanged

## 2024-02-17 NOTE — PLAN OF CARE
"  Problem: Adult Inpatient Plan of Care  Goal: Plan of Care Review  Description: The Plan of Care Review/Shift note should be completed every shift.  The Outcome Evaluation is a brief statement about your assessment that the patient is improving, declining, or no change.  This information will be displayed automatically on your shift  note.  Outcome: Progressing  Goal: Patient-Specific Goal (Individualized)  Description: You can add care plan individualizations to a care plan. Examples of Individualization might be:  \"Parent requests to be called daily at 9am for status\", \"I have a hard time hearing out of my right ear\", or \"Do not touch me to wake me up as it startles  me\".  Outcome: Progressing  Goal: Absence of Hospital-Acquired Illness or Injury  Outcome: Progressing  Intervention: Identify and Manage Fall Risk  Recent Flowsheet Documentation  Taken 2/17/2024 0754 by Tyrell Aguilera RN  Safety Promotion/Fall Prevention:   activity supervised   assistive device/personal items within reach   clutter free environment maintained   nonskid shoes/slippers when out of bed   patient and family education   room organization consistent   safety round/check completed   lighting adjusted  Intervention: Prevent Skin Injury  Recent Flowsheet Documentation  Taken 2/17/2024 0955 by Tyrell Aguilera RN  Body Position: position changed independently  Taken 2/17/2024 0754 by Tyrell Aguilera RN  Body Position: position changed independently  Device Skin Pressure Protection: absorbent pad utilized/changed  Intervention: Prevent and Manage VTE (Venous Thromboembolism) Risk  Recent Flowsheet Documentation  Taken 2/17/2024 0754 by Tyrell Aguilera RN  VTE Prevention/Management: SCDs (sequential compression devices) on  Goal: Optimal Comfort and Wellbeing  Outcome: Progressing  Intervention: Monitor Pain and Promote Comfort  Recent Flowsheet Documentation  Taken 2/17/2024 0836 by Tyrell Aguilera RN  Pain Management " Interventions:   repositioned   relaxation techniques promoted   rest  Taken 2/17/2024 0754 by Tyrell Aguilera RN  Pain Management Interventions: medication (see MAR)  Goal: Readiness for Transition of Care  Outcome: Progressing     Problem: Gas Exchange Impaired  Goal: Optimal Gas Exchange  Outcome: Progressing  Intervention: Optimize Oxygenation and Ventilation  Recent Flowsheet Documentation  Taken 2/17/2024 0754 by Tyrell Aguilera RN  Airway/Ventilation Management: airway patency maintained     Problem: Fall Injury Risk  Goal: Absence of Fall and Fall-Related Injury  Outcome: Progressing  Intervention: Identify and Manage Contributors  Recent Flowsheet Documentation  Taken 2/17/2024 0754 by Tyrell Aguilera RN  Medication Review/Management: medications reviewed  Intervention: Promote Injury-Free Environment  Recent Flowsheet Documentation  Taken 2/17/2024 0754 by Tyrell Aguilera RN  Safety Promotion/Fall Prevention:   activity supervised   assistive device/personal items within reach   clutter free environment maintained   nonskid shoes/slippers when out of bed   patient and family education   room organization consistent   safety round/check completed   lighting adjusted     Problem: Heart Failure  Goal: Optimal Cardiac Output  Outcome: Progressing  Intervention: Optimize Cardiac Output  Recent Flowsheet Documentation  Taken 2/17/2024 0754 by Tyrell Aguilera RN  Environmental Support:   calm environment promoted   rest periods encouraged  Goal: Stable Heart Rate and Rhythm  Outcome: Progressing  Goal: Optimal Functional Ability  Outcome: Progressing  Intervention: Optimize Functional Ability  Recent Flowsheet Documentation  Taken 2/17/2024 1024 by Tyrell Aguilera RN  Activity Management: up in chair  Taken 2/17/2024 1023 by Tyrell Aguilera RN  Activity Management:   ambulated in room   ambulated to bathroom  Taken 2/17/2024 0955 by Tyrell Aguilera RN  Activity Management:   ambulated in  room   up in chair  Taken 2/17/2024 0754 by Tyrell Aguilera RN  Activity Management: activity adjusted per tolerance  Goal: Effective Oxygenation and Ventilation  Outcome: Progressing  Intervention: Promote Airway Secretion Clearance  Recent Flowsheet Documentation  Taken 2/17/2024 1024 by Tyrell Aguilera RN  Activity Management: up in chair  Taken 2/17/2024 1023 by Tyrell Aguilera RN  Activity Management:   ambulated in room   ambulated to bathroom  Taken 2/17/2024 0955 by Tyrell Aguilera RN  Activity Management:   ambulated in room   up in chair  Taken 2/17/2024 0754 by Tyrell Aguilera RN  Breathing Techniques/Airway Clearance: deep/controlled cough encouraged  Activity Management: activity adjusted per tolerance  Intervention: Optimize Oxygenation and Ventilation  Recent Flowsheet Documentation  Taken 2/17/2024 0754 by Tyrell Aguilera RN  Airway/Ventilation Management: airway patency maintained   Goal Outcome Evaluation:  Heart Failure Care Map  GOALS TO BE MET BEFORE DISCHARGE:    1. Decrease congestion and/or edema with diuretic therapy to achieve near optimal volume status.     Dyspnea improved: No, further care required to meet this goal. Please explain pt on 4LNC sob with exertion    Edema improved: Yes, satisfactory for discharge.        Last 24 hour I/O:   Intake/Output Summary (Last 24 hours) at 2/17/2024 1542  Last data filed at 2/17/2024 1214  Gross per 24 hour   Intake 1065 ml   Output 4950 ml   Net -3885 ml           Net I/O and Weights since admission:   01/18 2300 - 02/17 2259  In: 2155 [P.O.:2040; I.V.:115]  Out: 7575 [Urine:7575]  Net: -5420     Vitals:    02/14/24 1435 02/14/24 2133 02/15/24 0603 02/16/24 0645   Weight: 127 kg (280 lb) 140.6 kg (310 lb) 140.2 kg (309 lb 1.4 oz) 140.1 kg (308 lb 12.8 oz)    02/17/24 0601 02/17/24 0648   Weight: 139.1 kg (306 lb 10.6 oz) 137.8 kg (303 lb 11.2 oz)       2.  O2 sats > 90% on room air, or at prior home O2 therapy level.      Able to  wean O2 this shift to keep sats above 90%?: No, further care required to meet this goal. Please explain pt still requires oxygen    Does patient use Home O2? Yes-  no home o2          Current oxygenation status:   SpO2: 96 %     O2 Device: Nasal cannula, Oxygen Delivery: 4 LPM    3.  Tolerates ambulation and mobility near baseline.     Ambulation: No, further care required to meet this goal. Please explain HR afib rvr    Times patient ambulated with staff this shift: 1    Please review the Heart Failure Care Map for additional HF goal outcomes.    Tyrell Aguilera RN  2/17/2024         B/p soft 93/60 denies light headed or dizziness. SOB with exertion. Sats 93-97% on 4LNC at rest. LS dim coarse exp wheezing. HA controlled with ibprofen. HR afib 100-120's. Dilt po added to meds today. Hr remains around low 100's to 120's. Asymptomatic. 1300 uo on iv lasix.

## 2024-02-17 NOTE — PROGRESS NOTES
[facility]    Medicine Progress Note - Hospitalist Service    Date of Admission:  2/14/2024    Primary Care Physician   [facility]  CONSULTANTS: cardiology    Assessment & Plan   Shilpa Miramontes is a 53-year-old female with a medical history of non-rheumatic aortic valve stenosis, undiagnosed COPD, former smoker, pre-diabetes, hypertension, hyperlipidemia, morbid obesity, cholecystectomy, and RSV infection in November 2023 who presented to the [facility] emergency department for the evaluation of shortness of breath and she was found to be in a new onset of atrial fibrillation.       Acute hypoxic respiratory failure due to Acute congstive heart failure, aortic stenosis, rapid atrial fibrillation, hypoventilation syndrome  Patient presented with worsening shortness of breath.  She has been having issues since she was diagnosed with RSV in 11/2023.  She states that she keeps getting sick with viral infections but I suspect this is more related to her heart and possible heart failure.  In the emergency room her BNP was elevated, her chest x-ray showed likely pulmonary edema, and her procalcitonin is low.  She was started empirically on antibiotics ahd steroids which I stopped.  I suspect that her heart failure is a combination of her uncontrolled atrial fibrillation (has had palpitations for some time), aortic stenosis (less likely as not severe), hypoventilation syndrome, and diet.  Patient in the emergency room was tachypneic with a respiratory rate in the 40s and required BiPAP.  She has been able to be weaned down to 6 L of oxygen via facemask.  In the ER CBC negative for leukocytosis (WBC at 9), and procalcitonin is at 0.05.  TSH 2.52 within normal limits.  Influenza, RSV, and COVID came back negative.  proBNP was elevated at 1,647, the patient received 40 mg of IV Lasix in the ED. Also 40 mg of oral prednisone given to patient in the ED due to wheezing.  EKG  with Afib without ST elevation or depression.  Chest x-ray showed patchy interstitial opacities concerning for pulmonary edema versus sequela of infection.  Patient has a known history of aortic stenosis in the past.  --Currently, overnight had pause of 4 seconds on telemetry.  Her diltiazem drip was stopped.  She continues on a beta-blocker.  Her heart rate was under control overnight but bumped up this morning.  I am okay with her getting her beta-blocker.  Patient has had 4.6 L out with her weight dropping to 137.8 kg from 140.6 kg.    Her creatinine is slightly increased today as well and her edema is improved.  Patient's shortness of breath is much improved but is still requiring 4 L of oxygen which needs to get weaned down.    Appreciate cardiology input.  Echo done 2/15/24, actually showing good ejection fraction of 55 to 60% with only mild aortic stenosis.  She has seen [facility].   Will continue diuretics for 1 more day and follow her creatinine.  Still is requiring oxygen supplementation which is getting weaned down.  Will also start ambulating the patient to see what happens with her heart rate and breathing.    Possible COPD  Patient has a history of tobacco abuse in the past.  Has had ongoing wheezing but this could be due to CHF as well.  She does think that inhalers have helped her.  Patient currently is on a steroid burst but do not think this is actually treating anything at this point.  I think her symptoms are due to her heart failure.  She was taken off of steroids.  Has as needed DuoNebs.        New onset rapid atrial fibrillation, hypertension, sinus sri/pauses  Patient tells me that she has had palpitations for some time so this could have been going on for some time.  This is the first time though she has been found to have atrial fibrillation.  She was placed on diltiazem drip for rate control and her heart rate is actually under better control.  Her blood pressure was  running on the lower side.  At this point we stopped her lisinopril and amlodipine to get blood pressure room.  Cardiology placed the patient on oral diltiazem and the patient required diltiazem drip on/off.  She was placed on oral beta-blockers which seems to be doing a better job of controlling her heart rate.  She did have some sinus pauses and bradycardia with both diltiazem and her Lopressor together so she is off the diltiazem at this point.    Patient was placed on Eliquis by cardiology.  Planning cardioversion in 3 to 4 weeks.  TSH is normal.  Denies significant alcohol use.   Would accept a blood pressure around 90 if she is asymptomatic as long as her heart rates under control.    Hyperlipidemia  Continue with PTA Atorvastatin     Prediabetes  Blood sugars have been 122-162 in the setting of being on steroids.  Her hemoglobin A1c is 6.2.  At this point we will stop following Accu-Cheks          Hypokalemia/hypomagnesemia  With the patient's ongoing diuresis will place the patient on replacement protocol.    Morbidly obese, hypoventilation syndrome, suspected sleep apnea  Patient with a BMI of 57.4 as of 2/17/24.  Some of that is due to fluid.  Given her shortness of breath I suspect she has a component of hypoventilation syndrome.  Patient likely would benefit from an outpatient sleep study at some point.        Discussed plan of care with nursing, cardiology notes reviewed      Diet: Low Saturated Fat Na <2400 mg  switch to a low salt diet  DVT Prophylaxis: heparin drip, stop pneumo-boots  Xiao Catheter: Not present  Lines: None     Cardiac Monitoring: ACTIVE order. Indication: Tachyarrhythmias, acute (48 hours)    RESTRAINTS: not indicated  Code Status: Full Code         This document was created using voice recognition technology.  Please excuse any typographical errors that may have occurred.  Please call with any questions.         Clinically Significant Risk Factors                         # Severe  "Obesity: Estimated body mass index is 57.38 kg/m  as calculated from the following:    Height as of this encounter: 1.549 m (5' 1\").    Weight as of this encounter: 137.8 kg (303 lb 11.2 oz)., PRESENT ON ADMISSION            Disposition Plan     Expected Discharge Date: 02/18/2024             patient will be here for days. Needs to wean off oxygen.  Patient needs to start ambulating getting up and about.       Barrier to discharge: hypoxia, chf    Logan Ravi MD  Hospitalist Service  [facility]  Securely message with [employer] (more info)  Text page via [facility] Paging/Directory   ______________________________________________________________________    Interval History   Patient overall is feeling better.  Has less shortness of breath and her oxygen is improved with only requiring 4 L of oxygen now.  This is getting weaned off.  States that her edema in her legs are much better.  Overnight had a 4-second pause so her diltiazem drip was stopped and only is on a beta-blocker at this point.  She has no chest pain or other symptoms to speak of.  When the 4-second pause happened she describes having an episode like she was [place] [facility] [place].    ROS: A comprehensive review of systems was negative except for items noted in the HPI.  Patient currently denies any fever, chills, sweats, nausea, vomiting, diarrhea,  or chest pain.       Physical Exam   Vital Signs: Temp: 98.8  F (37.1  C) Temp src: Axillary BP: 107/55 Pulse: (!) 130   Resp: 19 SpO2: 95 % O2 Device: Oxymask Oxygen Delivery: 4 LPM  Weight: 303 lbs 11.2 oz    Exam seems slightly improved from yesterday  General appearance: Patient is alert and oriented x3, no apparent distress, does not appear to be in significant respiratory distress, pleasant and conversing normally, speaking in full sentences, appear older than stated age  HEENT:   Mucous membranes are moist  RESPIRATORY: Distant breath sounds, sound coarse but are quite distant, " and she today has a few wheezes, decreased at the bases, moderate  air movement  CARDIOVASCULAR: Irregularly irregular with tachycardia, 2/6 ASTRID over aorta but distance due to body habitus    GASTROINTESTINAL: obese,  Non-distended, non-tender, soft, bowel sounds present throughout,  NEUROLOGIC:  Cranial nerves II-XII intact, without any focal deficits, strength 5/5 throughout  EXTREMITIES:  Moves all extremities, no clubbing, cyanosis, edema has basically resolved  :  Dameon not present         Data     I have personally reviewed the following data over the past 24 hrs:    N/A  \   N/A   / 238     142 101 32.8 (H) /  118 (H)   4.3 29 1.17 (H) \       Imaging:   Results for orders placed or performed during the hospital encounter of 02/14/24   XR Chest Port 1 View    Narrative    EXAM: XR CHEST PORT 1 VIEW  LOCATION: [facility]  DATE: 2/14/2024    INDICATION: Shortness of breath  COMPARISON: 11/27/2023.      Impression    IMPRESSION: Patchy interstitial opacities may represent pulmonary edema and/or sequelae of infection. No significant effusions or pneumothorax. Cardiomegaly. No acute osseous findings.     Procedures: echo 2/15/24  There is mild (1+) aortic regurgitation.  Mild valvular aortic stenosis.  The mean AoV pressure gradient is 17mmHg.  There is mild concentric left ventricular hypertrophy.  The visual ejection fraction is 55-60%.  The left atrium is mildly dilated.  There is mild (1+) mitral regurgitation.  The rhythm was atrial fibrillation.  The study was technically difficult. Contrast was used without apparent  complications.    I have personally have reviewed the patient's most up to date labs, telemetry,  orders, and medications myself, echo

## 2024-02-17 NOTE — PLAN OF CARE
"  Problem: Adult Inpatient Plan of Care  Goal: Plan of Care Review  Description: The Plan of Care Review/Shift note should be completed every shift.  The Outcome Evaluation is a brief statement about your assessment that the patient is improving, declining, or no change.  This information will be displayed automatically on your shift  note.  Outcome: Progressing  Goal: Patient-Specific Goal (Individualized)  Description: You can add care plan individualizations to a care plan. Examples of Individualization might be:  \"Parent requests to be called daily at 9am for status\", \"I have a hard time hearing out of my right ear\", or \"Do not touch me to wake me up as it startles  me\".  Outcome: Progressing  Goal: Absence of Hospital-Acquired Illness or Injury  Outcome: Progressing  Intervention: Identify and Manage Fall Risk  Recent Flowsheet Documentation  Taken 2/16/2024 1544 by Tyrell Aguilera RN  Safety Promotion/Fall Prevention:   activity supervised   assistive device/personal items within reach   clutter free environment maintained   nonskid shoes/slippers when out of bed   patient and family education   safety round/check completed  Taken 2/16/2024 0904 by Tyrell Aguilera RN  Safety Promotion/Fall Prevention:   activity supervised   assistive device/personal items within reach   clutter free environment maintained   increased rounding and observation   increase visualization of patient   lighting adjusted   nonskid shoes/slippers when out of bed   patient and family education   room organization consistent   safety round/check completed  Intervention: Prevent Skin Injury  Recent Flowsheet Documentation  Taken 2/16/2024 1032 by Tyrell Aguilera RN  Body Position: position changed independently  Intervention: Prevent and Manage VTE (Venous Thromboembolism) Risk  Recent Flowsheet Documentation  Taken 2/16/2024 0904 by Tyrell Aguilera RN  VTE Prevention/Management: SCDs (sequential compression devices) " on  Goal: Optimal Comfort and Wellbeing  Outcome: Progressing  Intervention: Monitor Pain and Promote Comfort  Recent Flowsheet Documentation  Taken 2/16/2024 1730 by Tyrell Aguilera RN  Pain Management Interventions: MD notified (comment)  Taken 2/16/2024 1632 by Tyrell Aguilera RN  Pain Management Interventions: medication (see MAR)  Goal: Readiness for Transition of Care  Outcome: Progressing     Problem: Gas Exchange Impaired  Goal: Optimal Gas Exchange  Outcome: Progressing     Problem: Fall Injury Risk  Goal: Absence of Fall and Fall-Related Injury  Outcome: Progressing  Intervention: Identify and Manage Contributors  Recent Flowsheet Documentation  Taken 2/16/2024 0904 by Tyrell Aguilera RN  Medication Review/Management: medications reviewed  Intervention: Promote Injury-Free Environment  Recent Flowsheet Documentation  Taken 2/16/2024 1544 by Tyrell Aguilera RN  Safety Promotion/Fall Prevention:   activity supervised   assistive device/personal items within reach   clutter free environment maintained   nonskid shoes/slippers when out of bed   patient and family education   safety round/check completed  Taken 2/16/2024 0904 by Tyrell Aguilera RN  Safety Promotion/Fall Prevention:   activity supervised   assistive device/personal items within reach   clutter free environment maintained   increased rounding and observation   increase visualization of patient   lighting adjusted   nonskid shoes/slippers when out of bed   patient and family education   room organization consistent   safety round/check completed     Problem: Heart Failure  Goal: Optimal Cardiac Output  Outcome: Progressing  Goal: Stable Heart Rate and Rhythm  Outcome: Progressing  Goal: Optimal Functional Ability  Outcome: Progressing  Intervention: Optimize Functional Ability  Recent Flowsheet Documentation  Taken 2/16/2024 1544 by Tyrell Aguilera RN  Activity Management: activity adjusted per tolerance  Taken 2/16/2024 1032 by  Tyrell Aguilera, RN  Activity Management:   ambulated to bathroom   back to bed  Goal: Effective Oxygenation and Ventilation  Outcome: Progressing  Intervention: Promote Airway Secretion Clearance  Recent Flowsheet Documentation  Taken 2/16/2024 1544 by Tyrell Aguilera, RN  Activity Management: activity adjusted per tolerance  Taken 2/16/2024 1032 by Tyrell Aguilera, RN  Activity Management:   ambulated to bathroom   back to bed  Taken 2/16/2024 0904 by Tyrell Aguilera, RN  Cough And Deep Breathing: done independently per patient   Goal Outcome Evaluation:  VSS 5mg/hr dilt gtt started at 1021. Increased to 10mg/hr  at 1218. SBp low 100's. Denies light headed or dizziness. 25mg po metoprolol given at 1737. HR now Afib 87- 118 up to 130's with activity. LS dim coarse wheezing. Sats 95% on 5L NC at rest. Desats to 92% on 5LNC with activity. Up A1 to br. SKin folds wnl. Tylenol for ha and ibprofen ordered. +1-+2 BLE . 2750 uo this shift.

## 2024-02-17 NOTE — PROVIDER NOTIFICATION
"Paged MD that pt had a 4 second pause and HR 30 for around 20 seconds per tele. Pt symptomatic and felt \"weird\" but unable to describe further. Now feeling normal. Dilt gtt paused since 2232 2/16.    Diltiazem gtt now held by MD  "

## 2024-02-17 NOTE — PROGRESS NOTES
Cross cover notified of patient having a 4-second pause with heart rate of 30 for about 20 seconds on telemetry.  Reviewed telemetry.  There were multiple pauses in a row, but remains in A-fib and this was just after midnight.  Diltiazem drip has been off since 10:32 PM.  Oral metoprolol tartrate 25 mg twice daily was started during the day.  -Hold diltiazem drip as rates are currently   -Continue telemetry and notify provider if further pauses overnight, then morning metoprolol dose would need to be decreased

## 2024-02-17 NOTE — PROGRESS NOTES
Cardiology Progress Note          Assessment and Plan:         53-year-old female who was admitted to [facility] on 2/14/2024 with atrial fibrillation with rapid ventricular response and acute diastolic heart failure in the context of known mild to moderate aortic stenosis.  Her other comorbidities include hypertension, COPD and probable sleep apnea in the context of an elevated BMI.    Symptoms improved with IV diuretic therapy.  She was on IV diltiazem as well as p.o. metoprolol but diltiazem was discontinued overnight due to 4-second pause.She did  express some lightheadedness at the time of this pause and was noted to have shorter pauses prior to this event.    Heart rates elevated this morning at rest despite p.o. metoprolol.    IMPRESSION:    Atrial fibrillation with rapid ventricular response.  Symptomatic pause noted overnight on IV diltiazem in addition to metoprolol.  Mild to moderate aortic stenosis.  Acute diastolic heart failure secondary to #1 and 2  Elevated BMI.  COPD.    PLAN    -This is a difficult situation.  Heart rates are elevated at rest this morning despite the administration of p.o. metoprolol.  Intravenous diltiazem led to asymptomatic pause overnight.  -At this point I would recommend we reintroduce p.o. diltiazem cautiously at a dose of 30 mg every 6.  We may need to tolerate slightly less than optimal rate control for the moment, and in any event this may improve once she is closer to euvolemia.  -Continue IV diuretic therapy.  Consider transition to p.o. in 24 to 48 hours.        Interval History:     Events overnight noted.  She had a 4-second pause and appeared to be mildly symptomatic at the time with lightheadedness.  IV diltiazem was discontinued and she has had no further pauses on telemetry.    She denies any palpitations and states that her breathing has improved.  Rapid, irregular             Review of Systems:   As per subjective, otherwise 5 systems reviewed and  "negative.           Physical Exam:   Blood pressure 108/77, pulse 103, temperature 98.4  F (36.9  C), temperature source Axillary, resp. rate 18, height 1.549 m (5' 1\"), weight 137.8 kg (303 lb 11.2 oz), last menstrual period 2020, SpO2 95%.      Vital Sign Ranges  Temperature Temp  Av.1  F (36.7  C)  Min: 97.3  F (36.3  C)  Max: 98.8  F (37.1  C)   Blood pressure Systolic (24hrs), Av , Min:78 , Max:133        Diastolic (24hrs), Av, Min:46, Max:95      Pulse Pulse  Av.8  Min: 75  Max: 130   Respirations Resp  Av.9  Min: 18  Max: 24   Pulse oximetry SpO2  Av.9 %  Min: 92 %  Max: 98 %         Intake/Output Summary (Last 24 hours) at 2024 1119  Last data filed at 2024 1028  Gross per 24 hour   Intake 585 ml   Output 4850 ml   Net -4265 ml       Constitutional: NAD  Skin: Warm and dry  Neck: No JVD  Lungs: Decreased breath sounds at bases.  Cardiovascular: Rapid, irregular.  Abdomen: Soft, non tender.  Extremities and Back:  +1 pitting lower extremities  Neurological: Nonfocal           Medications:     I have reviewed this patient's current medications.              Data:     Labs reviewed.         Karla Baca MD, M.D.    "

## 2024-02-17 NOTE — PROVIDER NOTIFICATION
DR. Baca with cardiology paged via [employer]: pt had multiple pauses in a row and one 4 second pause with hr  of 30 for 20 seconds on tele. Is patient ok to get her am metoprolol? HR now 130's. Per Dr. Baca ok to give am metoprolol and continue to monitor.

## 2024-02-17 NOTE — PROVIDER NOTIFICATION
Dr. Baca with Cardiology paged via [employer]:. Pt hr 120's SBP 93. Pt asymptomatic sitting up in chair. Furosemide due now. Do you want this patient to receive Prn IV metoprolol. MD returned called and stated to hold off on giving the PRN metoprolol. Will give IV furosemide and continue to monitor.

## 2024-02-18 LAB
ANION GAP SERPL CALCULATED.3IONS-SCNC: 8 MMOL/L (ref 7–15)
BUN SERPL-MCNC: 28.7 MG/DL (ref 6–20)
CALCIUM SERPL-MCNC: 8.5 MG/DL (ref 8.6–10)
CHLORIDE SERPL-SCNC: 101 MMOL/L (ref 98–107)
CREAT SERPL-MCNC: 1.01 MG/DL (ref 0.51–0.95)
DEPRECATED HCO3 PLAS-SCNC: 33 MMOL/L (ref 22–29)
EGFRCR SERPLBLD CKD-EPI 2021: 67 ML/MIN/1.73M2
GLUCOSE SERPL-MCNC: 108 MG/DL (ref 70–99)
MAGNESIUM SERPL-MCNC: 2 MG/DL (ref 1.7–2.3)
POTASSIUM SERPL-SCNC: 4.1 MMOL/L (ref 3.4–5.3)
SODIUM SERPL-SCNC: 142 MMOL/L (ref 135–145)

## 2024-02-18 PROCEDURE — 250N000013 HC RX MED GY IP 250 OP 250 PS 637: Performed by: NURSE PRACTITIONER

## 2024-02-18 PROCEDURE — 80048 BASIC METABOLIC PNL TOTAL CA: CPT | Performed by: INTERNAL MEDICINE

## 2024-02-18 PROCEDURE — 250N000013 HC RX MED GY IP 250 OP 250 PS 637: Performed by: INTERNAL MEDICINE

## 2024-02-18 PROCEDURE — 36415 COLL VENOUS BLD VENIPUNCTURE: CPT | Performed by: INTERNAL MEDICINE

## 2024-02-18 PROCEDURE — 120N000013 HC R&B IMCU

## 2024-02-18 PROCEDURE — 250N000013 HC RX MED GY IP 250 OP 250 PS 637: Performed by: HOSPITALIST

## 2024-02-18 PROCEDURE — 83735 ASSAY OF MAGNESIUM: CPT | Performed by: INTERNAL MEDICINE

## 2024-02-18 PROCEDURE — 99232 SBSQ HOSP IP/OBS MODERATE 35: CPT | Performed by: INTERNAL MEDICINE

## 2024-02-18 PROCEDURE — 258N000003 HC RX IP 258 OP 636: Performed by: INTERNAL MEDICINE

## 2024-02-18 PROCEDURE — 250N000011 HC RX IP 250 OP 636: Mod: JZ | Performed by: INTERNAL MEDICINE

## 2024-02-18 RX ORDER — DIGOXIN 125 MCG
125 TABLET ORAL DAILY
Status: DISCONTINUED | OUTPATIENT
Start: 2024-02-19 | End: 2024-02-21 | Stop reason: HOSPADM

## 2024-02-18 RX ORDER — METOPROLOL TARTRATE 1 MG/ML
5 INJECTION, SOLUTION INTRAVENOUS EVERY 4 HOURS PRN
Status: DISCONTINUED | OUTPATIENT
Start: 2024-02-18 | End: 2024-02-21 | Stop reason: HOSPADM

## 2024-02-18 RX ORDER — DIGOXIN 0.25 MG/ML
500 INJECTION INTRAMUSCULAR; INTRAVENOUS ONCE
Status: DISCONTINUED | OUTPATIENT
Start: 2024-02-18 | End: 2024-02-18

## 2024-02-18 RX ORDER — DIGOXIN 0.25 MG/ML
250 INJECTION INTRAMUSCULAR; INTRAVENOUS ONCE
Status: COMPLETED | OUTPATIENT
Start: 2024-02-18 | End: 2024-02-18

## 2024-02-18 RX ORDER — FUROSEMIDE 40 MG
40 TABLET ORAL DAILY
Status: DISCONTINUED | OUTPATIENT
Start: 2024-02-18 | End: 2024-02-21 | Stop reason: HOSPADM

## 2024-02-18 RX ADMIN — DIGOXIN 250 MCG: 0.25 INJECTION INTRAMUSCULAR; INTRAVENOUS at 15:00

## 2024-02-18 RX ADMIN — SODIUM CHLORIDE 250 ML: 9 INJECTION, SOLUTION INTRAVENOUS at 00:43

## 2024-02-18 RX ADMIN — ATORVASTATIN CALCIUM 10 MG: 10 TABLET, FILM COATED ORAL at 20:15

## 2024-02-18 RX ADMIN — DILTIAZEM HYDROCHLORIDE 30 MG: 30 TABLET, FILM COATED ORAL at 18:46

## 2024-02-18 RX ADMIN — APIXABAN 5 MG: 5 TABLET, FILM COATED ORAL at 09:50

## 2024-02-18 RX ADMIN — METOPROLOL TARTRATE 25 MG: 25 TABLET, FILM COATED ORAL at 06:26

## 2024-02-18 RX ADMIN — GUAIFENESIN 200 MG: 200 SOLUTION ORAL at 09:55

## 2024-02-18 RX ADMIN — DILTIAZEM HYDROCHLORIDE 30 MG: 30 TABLET, FILM COATED ORAL at 11:27

## 2024-02-18 RX ADMIN — ACETAMINOPHEN 650 MG: 325 TABLET, FILM COATED ORAL at 09:55

## 2024-02-18 RX ADMIN — APIXABAN 5 MG: 5 TABLET, FILM COATED ORAL at 20:15

## 2024-02-18 RX ADMIN — DILTIAZEM HYDROCHLORIDE 30 MG: 30 TABLET, FILM COATED ORAL at 23:25

## 2024-02-18 RX ADMIN — METOPROLOL TARTRATE 25 MG: 25 TABLET, FILM COATED ORAL at 17:31

## 2024-02-18 RX ADMIN — FUROSEMIDE 40 MG: 40 TABLET ORAL at 09:50

## 2024-02-18 RX ADMIN — IBUPROFEN 600 MG: 600 TABLET, FILM COATED ORAL at 06:25

## 2024-02-18 RX ADMIN — DILTIAZEM HYDROCHLORIDE 30 MG: 30 TABLET, FILM COATED ORAL at 05:27

## 2024-02-18 ASSESSMENT — ACTIVITIES OF DAILY LIVING (ADL)
ADLS_ACUITY_SCORE: 29
ADLS_ACUITY_SCORE: 29
ADLS_ACUITY_SCORE: 30
ADLS_ACUITY_SCORE: 29
ADLS_ACUITY_SCORE: 29
ADLS_ACUITY_SCORE: 30
ADLS_ACUITY_SCORE: 27
ADLS_ACUITY_SCORE: 30
ADLS_ACUITY_SCORE: 30
ADLS_ACUITY_SCORE: 27
ADLS_ACUITY_SCORE: 30
ADLS_ACUITY_SCORE: 29

## 2024-02-18 NOTE — PROVIDER NOTIFICATION
Dr. Baca with cards paged via [facility]: pt b/p 91/68 -140's when moving around. Do you want us to admin the prn metoprolol with the b/p soft. Dr. Baca called back and gave a TORB to change metoprolol to 5mg IV prn every 4 hours with same parameters. He also stated he would order Dig and to hold the prn metoprolol for now. Dig given per order. Will continue to monitor per poc. Pt is asymptomatic.

## 2024-02-18 NOTE — PROVIDER NOTIFICATION
Notified crossover for BP 85/63. IV lasix was given prior shift, and . After given oral diltiazem, recheck BP was low. 250mL NS bolus given. Recheck BP after. Held scheduled IV lasix.    Recheck BP was 138/55

## 2024-02-18 NOTE — PLAN OF CARE
"Goal Outcome Evaluation:      Plan of Care Reviewed With: patient    Overall Patient Progress: improving     Temp: 97.6  F (36.4  C) Temp src: Axillary BP: 100/63 Pulse: 94   Resp: 20 SpO2: 97 % O2 Device: Nasal cannula Oxygen Delivery: 4 LPM     Pt A/O x4. IMC care, close monitoring for afib RVR. Tachycardic, HR sustained in the 120-140s despite scheduled Metoprolol and Diltiazem, PRN IVP Metoprolol given 1x w/ great improvement: HR 80-100s. C/o generalized headache, managed w/ PRN Ibuprofen 1x. LS dim, O2 stable on 4L O2 NC. K+ and Mg protocols within parameters, AM rechecks. On tele: A-fib RVR before IVP Metoprolol. Up SBA to bathroom. On IV Lasix, adequate UOP. Will continue to monitor.    Problem: Adult Inpatient Plan of Care  Goal: Plan of Care Review  Description: The Plan of Care Review/Shift note should be completed every shift.  The Outcome Evaluation is a brief statement about your assessment that the patient is improving, declining, or no change.  This information will be displayed automatically on your shift  note.  Outcome: Progressing  Flowsheets (Taken 2/17/2024 2237)  Plan of Care Reviewed With: patient  Overall Patient Progress: improving  Goal: Patient-Specific Goal (Individualized)  Description: You can add care plan individualizations to a care plan. Examples of Individualization might be:  \"Parent requests to be called daily at 9am for status\", \"I have a hard time hearing out of my right ear\", or \"Do not touch me to wake me up as it startles  me\".  Outcome: Progressing  Goal: Absence of Hospital-Acquired Illness or Injury  Outcome: Progressing  Intervention: Identify and Manage Fall Risk  Recent Flowsheet Documentation  Taken 2/17/2024 1542 by Maki Davis, RN  Safety Promotion/Fall Prevention:   activity supervised   assistive device/personal items within reach   clutter free environment maintained   increase visualization of patient   lighting adjusted   patient and family education   nonskid " shoes/slippers when out of bed   room door open   room near nurse's station   room organization consistent   safety round/check completed   supervised activity  Intervention: Prevent Skin Injury  Recent Flowsheet Documentation  Taken 2/17/2024 1542 by Maki Daivs RN  Body Position: position changed independently  Intervention: Prevent and Manage VTE (Venous Thromboembolism) Risk  Recent Flowsheet Documentation  Taken 2/17/2024 1542 by Maki Davis RN  VTE Prevention/Management: SCDs (sequential compression devices) off  Intervention: Prevent Infection  Recent Flowsheet Documentation  Taken 2/17/2024 1542 by Maki Davis RN  Infection Prevention: hand hygiene promoted  Goal: Optimal Comfort and Wellbeing  Outcome: Progressing  Intervention: Monitor Pain and Promote Comfort  Recent Flowsheet Documentation  Taken 2/17/2024 1925 by Maki Davis RN  Pain Management Interventions: rest  Taken 2/17/2024 1834 by Maki Davis RN  Pain Management Interventions: medication (see MAR)  Goal: Readiness for Transition of Care  Outcome: Progressing     Problem: Gas Exchange Impaired  Goal: Optimal Gas Exchange  Outcome: Progressing     Problem: Fall Injury Risk  Goal: Absence of Fall and Fall-Related Injury  Outcome: Progressing  Intervention: Identify and Manage Contributors  Recent Flowsheet Documentation  Taken 2/17/2024 1542 by Maki Davis RN  Medication Review/Management: medications reviewed  Intervention: Promote Injury-Free Environment  Recent Flowsheet Documentation  Taken 2/17/2024 1542 by Maki Davis RN  Safety Promotion/Fall Prevention:   activity supervised   assistive device/personal items within reach   clutter free environment maintained   increase visualization of patient   lighting adjusted   patient and family education   nonskid shoes/slippers when out of bed   room door open   room near nurse's station   room organization consistent   safety round/check completed   supervised activity     Problem: Heart  Failure  Goal: Optimal Cardiac Output  Outcome: Progressing  Goal: Stable Heart Rate and Rhythm  Outcome: Progressing  Goal: Optimal Functional Ability  Outcome: Progressing  Intervention: Optimize Functional Ability  Recent Flowsheet Documentation  Taken 2/17/2024 1542 by Maki Davis RN  Activity Management:   activity adjusted per tolerance   up in chair  Goal: Effective Oxygenation and Ventilation  Outcome: Progressing  Intervention: Promote Airway Secretion Clearance  Recent Flowsheet Documentation  Taken 2/17/2024 1542 by Maki Davis RN  Cough And Deep Breathing: done independently per patient  Activity Management:   activity adjusted per tolerance   up in chair

## 2024-02-18 NOTE — PROGRESS NOTES
Cross cover notified of patient with blood pressure of 85/63 and then when I looked at the vital signs to have it look like the most recent 1 was down into the 70s systolic.  Receiving IV Lasix 40 mg every 8 hours and has had at 2.75 L urine output last 24 hours.  Also currently on oral diltiazem 30 mg every 6 hours and oral metoprolol tartrate 25 mg twice daily.  -Hold order placed on IV Lasix  -250 mL NS bolus and reassess blood pressure after this

## 2024-02-18 NOTE — PROGRESS NOTES
[facility]    Medicine Progress Note - Hospitalist Service    Date of Admission:  2/14/2024    Primary Care Physician   [facility]  CONSULTANTS: cardiology    Assessment & Plan   Shilpa Miramontes is a 53-year-old female with a medical history of non-rheumatic aortic valve stenosis, undiagnosed COPD, former smoker, pre-diabetes, hypertension, hyperlipidemia, morbid obesity, cholecystectomy, and RSV infection in November 2023 who presented to the [facility] emergency department for the evaluation of shortness of breath and she was found to be in a new onset of atrial fibrillation.       Acute hypoxic respiratory failure due to Acute congstive heart failure, aortic stenosis, rapid atrial fibrillation, hypoventilation syndrome  Patient presented with worsening shortness of breath.  She has been having issues since she was diagnosed with RSV in 11/2023.  She states that she keeps getting sick with viral infections but I suspect this is more related to her heart and possible heart failure.  In the emergency room, her BNP was elevated, her chest x-ray showed likely pulmonary edema, and her procalcitonin was low.  She was started empirically on antibiotics and steroids which I stopped as her symptoms are consistent with heart failure due to a combination of her uncontrolled atrial fibrillation (has had palpitations for some time), aortic stenosis (less likely as not severe), hypoventilation syndrome, and diet.  Patient in the emergency room was tachypneic with a respiratory rate in the 40s and required BiPAP.  She has been able to be weaned down to 3 L of oxygen via facemask as of 2/18.  In the ER CBC negative for leukocytosis (WBC at 9), and procalcitonin is at 0.05.  TSH 2.52 within normal limits.  Influenza, RSV, and COVID came back negative.  proBNP was elevated at 1,647, the patient received 40 mg of IV Lasix in the ED. Also 40 mg of oral prednisone given to patient in  the ED due to wheezing.  EKG with Afib without ST elevation or depression.  Chest x-ray showed patchy interstitial opacities concerning for pulmonary edema versus sequela of infection.  Patient has a known history of aortic stenosis in the past.  --Currently, treated with diltiazem and lopressor. Has required a diltiazem drip.  Rate under better control but has had pauses so the drip was stopped. 2/17 had pause of 4 seconds on telemetry.   This is a difficult situation.  If heart rate continues to be uncontrolled, consider 1 dose of digoxin and consider cardioversion.  Has low blood pressure last night 2/18, but asymptomatic. I am ok with a BP around 90.  She got a fluid bolus and lasix IV stopped.  Will place on oral lasix now.  Has had 7.1 liters out, weight down 134.7 kg from 140.6 kg.   Her creatinine is stable at 1.01 2/18/24.    -  Patient's shortness of breath is much improved but is still requiring 3 L of oxygen which needs to get weaned down.    Appreciate cardiology input.  Echo done 2/15/24, actually showing good ejection fraction of 55 to 60% with only mild aortic stenosis.  She has seen [place] [facility].    - would benefit from outpatient sleep study to rule out sleep apnea    Possible COPD  Patient has a history of tobacco abuse in the past.  Has had ongoing wheezing but this could be due to CHF as well.  She does think that inhalers have helped her.  Patient currently is on a steroid burst but do not think this is actually treating anything at this point.  I think her symptoms are due to her heart failure.  She was taken off of steroids.  Has as needed DuoNebs.        New onset rapid atrial fibrillation, hypertension, sinus sri/pauses  Patient tells me that she has had palpitations for some time so this could have been going on for some time.  This is the first time though she has been found to have atrial fibrillation.  She has been treated with diltiazem drip, oral dilt, and lopressor.   See above.  Her blood pressure was running on the lower side.  At this point, we stopped her lisinopril and amlodipine to get blood pressure room.  Cardiology placed the patient on oral diltiazem and the patient required diltiazem drip on/off.  She was placed on oral beta-blockers which seems to be doing a better job of controlling her heart rate but still having runs of tachycardia at times.  She did have some sinus pauses and bradycardia with both diltiazem and her Lopressor together so she is off the diltiazem drip at this point.    Patient was placed on Eliquis by cardiology.  Ideally, planning cardioversion in 3 to 4 weeks but if still difficult to control she may need ADAMARIS with cardioversion sooner than later.  TSH is normal.  Denies significant alcohol use.   Would accept a blood pressure around 90 if she is asymptomatic as long as her heart rates under control. Needs to ambulate to see what her heart rate response will be    Hyperlipidemia  Continue with PTA Atorvastatin     Prediabetes  Blood sugars have been 108-118.  Her hemoglobin A1c is 6.2.  At this point we will stop following Accu-Cheks          Hypokalemia/hypomagnesemia  With the patient's ongoing diuresis will place the patient on replacement protocol.    Morbidly obese, hypoventilation syndrome, suspected sleep apnea  Patient with a BMI of 56.1 as of 2/18/24.  Some of that is due to fluid.  Given her shortness of breath I suspect she has a component of hypoventilation syndrome.  Patient likely would benefit from an outpatient sleep study at some point.        Discussed plan of care with nursing, cardiology notes reviewed      Diet: Low Saturated Fat Na <2400 mg  switch to a low salt diet  DVT Prophylaxis: heparin drip, stop pneumo-boots  Xiao Catheter: Not present  Lines: None     Cardiac Monitoring: ACTIVE order. Indication: Tachyarrhythmias, acute (48 hours)    RESTRAINTS: not indicated  Code Status: Full Code         This document was created  "using voice recognition technology.  Please excuse any typographical errors that may have occurred.  Please call with any questions.         Clinically Significant Risk Factors                         # Severe Obesity: Estimated body mass index is 56.1 kg/m  as calculated from the following:    Height as of this encounter: 1.549 m (5' 1\").    Weight as of this encounter: 134.7 kg (296 lb 14.4 oz)., PRESENT ON ADMISSION            Disposition Plan     Expected Discharge Date: 02/19/2024             patient will be here for days. Needs to wean off oxygen.  Patient needs to start ambulating getting up and about. Needs heart rate under control       Barrier to discharge: hypoxia, chf    Logan Ravi MD  Hospitalist Service  [facility]  Securely message with [employer] (more info)  Text page via  Paging/Directory   ______________________________________________________________________    Interval History   Patient overall is feeling better.  Oxygen has been weaned down to 3 L.  Overnight her blood pressure dropped to 85/63 but she tells me she was asymptomatic.  She was given a 250 cc fluid bolus.  States that she does not have any significant edema anymore.  Heart rate still been up and down racing at times.  She denies any chest pain.      ROS: A comprehensive review of systems was negative except for items noted in the HPI.  Patient currently denies any fever, chills, sweats, nausea, vomiting, diarrhea,  or chest pain.       Physical Exam   Vital Signs: Temp: 98.2  F (36.8  C) Temp src: Oral BP: 97/81 Pulse: 120   Resp: 20 SpO2: 95 % O2 Device: Oxymask Oxygen Delivery: 3 LPM  Weight: 296 lbs 14.4 oz    Exam seems slightly improved from yesterday, daily improving slowly  General appearance: Patient is alert and oriented x3, no apparent distress, does not appear to be in significant respiratory distress, pleasant and conversing normally, speaking in full sentences, appear older than stated " age  HEENT:   Mucous membranes are moist  RESPIRATORY: Distant breath sounds, sound coarse but are quite distant, and she today has no current wheezes, decreased at the bases, moderate  air movement, nasal cannula at 3 L/min  CARDIOVASCULAR: Irregularly irregular with tachycardia still, 2/6 ASTRID over aorta but distance due to body habitus    GASTROINTESTINAL: obese,  Non-distended, non-tender, soft, bowel sounds present throughout,  NEUROLOGIC:  Cranial nerves II-XII intact, without any focal deficits, strength 5/5 throughout  EXTREMITIES:  Moves all extremities, no clubbing, cyanosis, edema has basically resolved does have some bilateral foot edema 1+  :  Dameon not present         Data     I have personally reviewed the following data over the past 24 hrs:    N/A  \   N/A   / N/A     142 101 28.7 (H) /  108 (H)   4.1 33 (H) 1.01 (H) \       Imaging:   Results for orders placed or performed during the hospital encounter of 02/14/24   XR Chest Port 1 View    Narrative    EXAM: XR CHEST PORT 1 VIEW  LOCATION: [facility]  DATE: 2/14/2024    INDICATION: Shortness of breath  COMPARISON: 11/27/2023.      Impression    IMPRESSION: Patchy interstitial opacities may represent pulmonary edema and/or sequelae of infection. No significant effusions or pneumothorax. Cardiomegaly. No acute osseous findings.     Procedures: echo 2/15/24  There is mild (1+) aortic regurgitation.  Mild valvular aortic stenosis.  The mean AoV pressure gradient is 17mmHg.  There is mild concentric left ventricular hypertrophy.  The visual ejection fraction is 55-60%.  The left atrium is mildly dilated.  There is mild (1+) mitral regurgitation.  The rhythm was atrial fibrillation.  The study was technically difficult. Contrast was used without apparent  complications.    I have personally have reviewed the patient's most up to date labs, telemetry,  orders, and medications myself, echo

## 2024-02-18 NOTE — PROGRESS NOTES
"Cardiology Progress Note          Assessment and Plan:         53-year-old female who was admitted to [facility] on 2024 with atrial fibrillation with rapid ventricular response and acute diastolic heart failure in the context of known mild to moderate aortic stenosis.  Her other comorbidities include hypertension, COPD and probable sleep apnea in the context of an elevated BMI.    Symptoms improved with IV diuretic therapy.  She was on IV diltiazem as well as p.o. metoprolol but diltiazem was discontinued on 2024 due to a symptomatic 4-second pause.    Heart rates better controlled on metoprolol and p.o. diltiazem.    IMPRESSION:    Atrial fibrillation with rapid ventricular response.  On Eliquis for thromboembolic prophylaxis.  Symptomatic pause noted on IV diltiazem in addition to metoprolol.  Mild to moderate aortic stenosis.  Acute diastolic heart failure secondary to #1 and 2  Elevated BMI.  COPD.    PLAN    -Continue diltiazem, metoprolol.  Will consider IV digoxin x 1 if heart rates remain elevated during the day today.  -Agree with transition to p.o. furosemide.  -If rate control is unsuccessful we may need to consider ADAMARIS guided cardioversion.        Interval History:     Feels better today.  Blood pressures have been on the lower side and she was switched to p.o. furosemide.             Review of Systems:   As per subjective, otherwise 5 systems reviewed and negative.           Physical Exam:   Blood pressure 104/69, pulse 97, temperature 98.2  F (36.8  C), temperature source Oral, resp. rate 20, height 1.549 m (5' 1\"), weight 134.7 kg (296 lb 14.4 oz), last menstrual period 2020, SpO2 95%.      Vital Sign Ranges  Temperature Temp  Av.1  F (36.7  C)  Min: 97.3  F (36.3  C)  Max: 98.8  F (37.1  C)   Blood pressure Systolic (24hrs), Av , Min:78 , Max:133        Diastolic (24hrs), Av, Min:46, Max:95      Pulse Pulse  Av.8  Min: 75  Max: 130   Respirations Resp  Avg: " 20.9  Min: 18  Max: 24   Pulse oximetry SpO2  Av.9 %  Min: 92 %  Max: 98 %         Intake/Output Summary (Last 24 hours) at 2024 1119  Last data filed at 2024 1028  Gross per 24 hour   Intake 585 ml   Output 4850 ml   Net -4265 ml       Constitutional: NAD  Skin: Warm and dry  Neck: No JVD  Lungs: Decreased breath sounds at bases.  Cardiovascular: Irregular  Abdomen: Soft, non tender.  Extremities and Back:  +1 pitting lower extremities  Neurological: Nonfocal           Medications:     I have reviewed this patient's current medications.              Data:     Labs reviewed.         Karla Baca MD, M.D.

## 2024-02-18 NOTE — PLAN OF CARE
Goal Outcome Evaluation:      Plan of Care Reviewed With: patient    Overall Patient Progress: no changeOverall Patient Progress: no change       A/Ox4. Denies sob, dizziness, or n/v. Complaint of headache, prn ibprofen given. SPRING. HR goes up to 150-160s with ambulation. K + Mag, recheck am. 4L NC, wean down to 3L oxymask as pt prefer uses oxymask during night time. Tele: Afib CVR. HR 80-100s at rest. Trace edema BLE. BP was 85/63 after giving PO dilt and IV lasix. Notified cross over, bolus given. Held IV lasix. BP seems to have improved. See flowsheet. On scheduled PO diltiazem and metoprolol.     449am: pt had 2.0s pause. Pt was sleeping comfortable. Asymptomatic. Notified cross over. No new order obtain at this time.

## 2024-02-18 NOTE — PLAN OF CARE
"  Problem: Adult Inpatient Plan of Care  Goal: Plan of Care Review  Description: The Plan of Care Review/Shift note should be completed every shift.  The Outcome Evaluation is a brief statement about your assessment that the patient is improving, declining, or no change.  This information will be displayed automatically on your shift  note.  Outcome: Progressing  Goal: Patient-Specific Goal (Individualized)  Description: You can add care plan individualizations to a care plan. Examples of Individualization might be:  \"Parent requests to be called daily at 9am for status\", \"I have a hard time hearing out of my right ear\", or \"Do not touch me to wake me up as it startles  me\".  Outcome: Progressing  Goal: Absence of Hospital-Acquired Illness or Injury  Outcome: Progressing  Intervention: Identify and Manage Fall Risk  Recent Flowsheet Documentation  Taken 2/18/2024 1031 by Tyrell Aguilera RN  Safety Promotion/Fall Prevention:   assistive device/personal items within reach   increase visualization of patient   lighting adjusted   nonskid shoes/slippers when out of bed   patient and family education  Taken 2/18/2024 0958 by Tyrell Aguilera RN  Safety Promotion/Fall Prevention:   activity supervised   assistive device/personal items within reach   clutter free environment maintained   increase visualization of patient   lighting adjusted   nonskid shoes/slippers when out of bed   patient and family education   room organization consistent   safety round/check completed  Intervention: Prevent Skin Injury  Recent Flowsheet Documentation  Taken 2/18/2024 0958 by Tyrell Aguilera RN  Body Position: position changed independently  Device Skin Pressure Protection: absorbent pad utilized/changed  Intervention: Prevent and Manage VTE (Venous Thromboembolism) Risk  Recent Flowsheet Documentation  Taken 2/18/2024 0958 by Tyrell Aguilera RN  VTE Prevention/Management: SCDs (sequential compression devices) off  Goal: " Optimal Comfort and Wellbeing  Outcome: Progressing  Intervention: Monitor Pain and Promote Comfort  Recent Flowsheet Documentation  Taken 2/18/2024 1038 by Tyrell Aguilera RN  Pain Management Interventions: medication (see MAR)  Taken 2/18/2024 0955 by Tyrell Aguilera RN  Pain Management Interventions: medication (see MAR)  Goal: Readiness for Transition of Care  Outcome: Progressing     Problem: Gas Exchange Impaired  Goal: Optimal Gas Exchange  Outcome: Progressing  Intervention: Optimize Oxygenation and Ventilation  Recent Flowsheet Documentation  Taken 2/18/2024 0958 by Tyrell Aguilera RN  Airway/Ventilation Management: airway patency maintained     Problem: Fall Injury Risk  Goal: Absence of Fall and Fall-Related Injury  Outcome: Progressing  Intervention: Identify and Manage Contributors  Recent Flowsheet Documentation  Taken 2/18/2024 1031 by Tyrell Aguilera RN  Medication Review/Management: medications reviewed  Taken 2/18/2024 0958 by Tyrell Aguilera RN  Medication Review/Management: medications reviewed  Intervention: Promote Injury-Free Environment  Recent Flowsheet Documentation  Taken 2/18/2024 1031 by Tyrell Aguilera RN  Safety Promotion/Fall Prevention:   assistive device/personal items within reach   increase visualization of patient   lighting adjusted   nonskid shoes/slippers when out of bed   patient and family education  Taken 2/18/2024 0958 by Tyrell Aguilera RN  Safety Promotion/Fall Prevention:   activity supervised   assistive device/personal items within reach   clutter free environment maintained   increase visualization of patient   lighting adjusted   nonskid shoes/slippers when out of bed   patient and family education   room organization consistent   safety round/check completed     Problem: Heart Failure  Goal: Optimal Cardiac Output  Outcome: Progressing  Goal: Stable Heart Rate and Rhythm  Outcome: Progressing  Goal: Optimal Functional Ability  Outcome:  Progressing  Intervention: Optimize Functional Ability  Recent Flowsheet Documentation  Taken 2/18/2024 1031 by Tyrell Aguilera RN  Activity Management:   ambulated in room   up in chair  Taken 2/18/2024 0958 by Tyrell Aguilera RN  Activity Management: activity adjusted per tolerance  Goal: Effective Oxygenation and Ventilation  Outcome: Progressing  Intervention: Promote Airway Secretion Clearance  Recent Flowsheet Documentation  Taken 2/18/2024 1031 by Tyrell Aguilera RN  Activity Management:   ambulated in room   up in chair  Taken 2/18/2024 0958 by Tyrell Aguilera RN  Breathing Techniques/Airway Clearance: deep/controlled cough encouraged  Cough And Deep Breathing: done independently per patient  Activity Management: activity adjusted per tolerance  Intervention: Optimize Oxygenation and Ventilation  Recent Flowsheet Documentation  Taken 2/18/2024 0958 by Tyrell Aguilera RN  Airway/Ventilation Management: airway patency maintained   Goal Outcome Evaluation:       B/p soft 91/68. HR afib 108 up to 140's with activity. Asymptomatic. Oxygen weaned to 2L oxymask. Voiding well. Creat improved. Tylenol for HA with some decrease. Mcuinex given for congestion.   Heart Failure Care Map  GOALS TO BE MET BEFORE DISCHARGE:    1. Decrease congestion and/or edema with diuretic therapy to achieve near optimal volume status.     Dyspnea improved: No, further care required to meet this goal. Please explain sob with exertion still on 2L oxygen mask    Edema improved: Yes, satisfactory for discharge.        Last 24 hour I/O:   Intake/Output Summary (Last 24 hours) at 2/18/2024 1619  Last data filed at 2/18/2024 1344  Gross per 24 hour   Intake 1320 ml   Output 3150 ml   Net -1830 ml           Net I/O and Weights since admission:   01/19 2300 - 02/18 2259  In: 3475 [P.O.:3360; I.V.:115]  Out: 56860 [Urine:30732]  Net: -7250     Vitals:    02/14/24 1435 02/14/24 2133 02/15/24 0603 02/16/24 0645   Weight: 127 kg (280  lb) 140.6 kg (310 lb) 140.2 kg (309 lb 1.4 oz) 140.1 kg (308 lb 12.8 oz)    02/17/24 0601 02/17/24 0648 02/18/24 0622   Weight: 139.1 kg (306 lb 10.6 oz) 137.8 kg (303 lb 11.2 oz) 134.7 kg (296 lb 14.4 oz)       2.  O2 sats > 90% on room air, or at prior home O2 therapy level.      Able to wean O2 this shift to keep sats above 90%?: No, further care required to meet this goal. Please explain 2L oxymask    Does patient use Home O2? No          Current oxygenation status:   SpO2: 91 %     O2 Device: Oxymask, Oxygen Delivery: 2 LPM    3.  Tolerates ambulation and mobility near baseline.     Ambulation: No, further care required to meet this goal. Please explain needs ambulation    Times patient ambulated with staff this shift: 1    Please review the Heart Failure Care Map for additional HF goal outcomes.    Tyrell Aguilera RN  2/18/2024

## 2024-02-19 LAB
ANION GAP SERPL CALCULATED.3IONS-SCNC: 9 MMOL/L (ref 7–15)
BUN SERPL-MCNC: 19.8 MG/DL (ref 6–20)
CALCIUM SERPL-MCNC: 8.4 MG/DL (ref 8.6–10)
CHLORIDE SERPL-SCNC: 100 MMOL/L (ref 98–107)
CREAT SERPL-MCNC: 0.82 MG/DL (ref 0.51–0.95)
DEPRECATED HCO3 PLAS-SCNC: 31 MMOL/L (ref 22–29)
EGFRCR SERPLBLD CKD-EPI 2021: 86 ML/MIN/1.73M2
GLUCOSE SERPL-MCNC: 123 MG/DL (ref 70–99)
MAGNESIUM SERPL-MCNC: 2.2 MG/DL (ref 1.7–2.3)
POTASSIUM SERPL-SCNC: 4.1 MMOL/L (ref 3.4–5.3)
SODIUM SERPL-SCNC: 140 MMOL/L (ref 135–145)

## 2024-02-19 PROCEDURE — 80048 BASIC METABOLIC PNL TOTAL CA: CPT | Performed by: INTERNAL MEDICINE

## 2024-02-19 PROCEDURE — 250N000013 HC RX MED GY IP 250 OP 250 PS 637: Performed by: INTERNAL MEDICINE

## 2024-02-19 PROCEDURE — 83735 ASSAY OF MAGNESIUM: CPT | Performed by: INTERNAL MEDICINE

## 2024-02-19 PROCEDURE — 250N000013 HC RX MED GY IP 250 OP 250 PS 637: Performed by: NURSE PRACTITIONER

## 2024-02-19 PROCEDURE — 250N000013 HC RX MED GY IP 250 OP 250 PS 637: Performed by: HOSPITALIST

## 2024-02-19 PROCEDURE — 99232 SBSQ HOSP IP/OBS MODERATE 35: CPT | Mod: FS | Performed by: NURSE PRACTITIONER

## 2024-02-19 PROCEDURE — 99233 SBSQ HOSP IP/OBS HIGH 50: CPT | Performed by: STUDENT IN AN ORGANIZED HEALTH CARE EDUCATION/TRAINING PROGRAM

## 2024-02-19 PROCEDURE — 250N000013 HC RX MED GY IP 250 OP 250 PS 637: Performed by: STUDENT IN AN ORGANIZED HEALTH CARE EDUCATION/TRAINING PROGRAM

## 2024-02-19 PROCEDURE — 36415 COLL VENOUS BLD VENIPUNCTURE: CPT | Performed by: INTERNAL MEDICINE

## 2024-02-19 PROCEDURE — 120N000001 HC R&B MED SURG/OB

## 2024-02-19 RX ORDER — CETIRIZINE HYDROCHLORIDE 10 MG/1
10 TABLET ORAL DAILY
Status: DISCONTINUED | OUTPATIENT
Start: 2024-02-19 | End: 2024-02-21 | Stop reason: HOSPADM

## 2024-02-19 RX ADMIN — IBUPROFEN 600 MG: 600 TABLET, FILM COATED ORAL at 00:59

## 2024-02-19 RX ADMIN — METOPROLOL TARTRATE 25 MG: 25 TABLET, FILM COATED ORAL at 08:11

## 2024-02-19 RX ADMIN — DILTIAZEM HYDROCHLORIDE 30 MG: 30 TABLET, FILM COATED ORAL at 12:34

## 2024-02-19 RX ADMIN — ACETAMINOPHEN 650 MG: 325 TABLET, FILM COATED ORAL at 08:19

## 2024-02-19 RX ADMIN — METOPROLOL TARTRATE 25 MG: 25 TABLET, FILM COATED ORAL at 18:19

## 2024-02-19 RX ADMIN — APIXABAN 5 MG: 5 TABLET, FILM COATED ORAL at 08:11

## 2024-02-19 RX ADMIN — GUAIFENESIN 200 MG: 200 SOLUTION ORAL at 08:19

## 2024-02-19 RX ADMIN — APIXABAN 5 MG: 5 TABLET, FILM COATED ORAL at 20:05

## 2024-02-19 RX ADMIN — DILTIAZEM HYDROCHLORIDE 30 MG: 30 TABLET, FILM COATED ORAL at 05:36

## 2024-02-19 RX ADMIN — CETIRIZINE HYDROCHLORIDE 10 MG: 10 TABLET, FILM COATED ORAL at 12:33

## 2024-02-19 RX ADMIN — ATORVASTATIN CALCIUM 10 MG: 10 TABLET, FILM COATED ORAL at 20:05

## 2024-02-19 RX ADMIN — DIGOXIN 125 MCG: 125 TABLET ORAL at 08:11

## 2024-02-19 RX ADMIN — DILTIAZEM HYDROCHLORIDE 30 MG: 30 TABLET, FILM COATED ORAL at 18:19

## 2024-02-19 RX ADMIN — FUROSEMIDE 40 MG: 40 TABLET ORAL at 08:11

## 2024-02-19 RX ADMIN — DILTIAZEM HYDROCHLORIDE 30 MG: 30 TABLET, FILM COATED ORAL at 23:48

## 2024-02-19 ASSESSMENT — ACTIVITIES OF DAILY LIVING (ADL)
ADLS_ACUITY_SCORE: 30
ADLS_ACUITY_SCORE: 30
ADLS_ACUITY_SCORE: 29
ADLS_ACUITY_SCORE: 29
ADLS_ACUITY_SCORE: 30
ADLS_ACUITY_SCORE: 29
ADLS_ACUITY_SCORE: 28
ADLS_ACUITY_SCORE: 29
ADLS_ACUITY_SCORE: 30
ADLS_ACUITY_SCORE: 28
ADLS_ACUITY_SCORE: 30
ADLS_ACUITY_SCORE: 28

## 2024-02-19 NOTE — PROGRESS NOTES
[facility] [place]    Medicine Progress Note - Hospitalist Service    Date of Admission:  2/14/2024    Assessment & Plan     Shilpa Miramontes is a 53-year-old female with a medical history of non-rheumatic aortic valve stenosis, undiagnosed COPD, former smoker, pre-diabetes, hypertension, hyperlipidemia, morbid obesity, cholecystectomy, and RSV infection in November 2023 who presented to the [facility] emergency department for the evaluation of shortness of breath and she was found to be in a new onset of atrial fibrillation.       Acute hypoxic respiratory failure, multifactorial   Acute congestive heart failure/HFpEF, mild/moderate Aortic Stenosis, AFRVR, hypoventilation syndrome  Ongoing worsening SOB; issues seemed to begin in 11/2023 after RSV and subsequent repeat viral infections but suspect some in part exacerbations of likely heart failure. BNP elevated on admission with pulmonary edema; antibiotics and steroids held due to suspicion of heart failure exacerbation as well as uncontrolled atrial fibrillation, superimposed with probable mild/moderate aortic stenosis, as well as hypoventilation syndrome, dietary considerations. TTE with EF 55-60% with mild/moderate aortic stenosis, LAE.   -initially on diltiazem drip, lopressor but now off of dilt as had 4 second pause. Planning on ADAMARIS/cardioversion tomorrow  -now on PO lasix as more than 7 liters net down.   -SOB much improved, but still requiring supplemental oxygen;   -recommend outpatient sleep study     New onset atrial fibrillation with RVR  Unclear onset, has had symptoms of palpitations for a while so could be ongoing for some time. Have trialed numerous combinations of IV/PO AV batsheva blocking agents still with difficult to control RVR with intermittent bradycardia/pauses.  -started on apixaban  -HR not optimally controlled even with increasing doses of AV batsheva agents complicated by intermittent pauses/bradycardia, planning on ADAMARIS  "directed cardioversion with hopes of restoring sinus rhythm tomorrow    Possible COPD  Hx of tobacco use previously; ongoing wheezing could both be 2/2 CHF and pulm edema vs COPD, however she does think that the inhalers have helped.   -continue prn duonebs (though may contribute to tachyarrhythmias)    Likely seasonal/allergic rhinitis  Headache/congestion in AM with symptoms ongoing for several months, will start cetirizine 5 mg daily (can increase to 10 mg), already on flonase, also recommend outpatient sleep study for CATARINO/overnight oxygenation evaluation.    Morbidly obese, hypoventilation syndrome, suspected sleep apnea  Patient with a BMI of 56.1 as of 2/18/24.  Some of that is due to fluid.  Given her shortness of breath I suspect she has a component of hypoventilation syndrome.  Patient likely would benefit from an outpatient sleep study at some point.     Hyperlipidemia  Continue with PTA Atorvastatin     Prediabetes  Blood sugars have been 108-118.  Her hemoglobin A1c is 6.2.  At this point we will stop following Accu-Cheks           Hypokalemia/hypomagnesemia  With the patient's ongoing diuresis will place the patient on replacement protocol.          Diet: Low Saturated Fat Na <2400 mg    DVT Prophylaxis: DOAC  Xiao Catheter: Not present  Lines: None     Cardiac Monitoring: ACTIVE order. Indication: Tachyarrhythmias, acute (48 hours)  Code Status: Full Code      Clinically Significant Risk Factors                         # Severe Obesity: Estimated body mass index is 56.02 kg/m  as calculated from the following:    Height as of this encounter: 1.549 m (5' 1\").    Weight as of this encounter: 134.5 kg (296 lb 8 oz).             Disposition Plan     Expected Discharge Date: 02/19/2024                  Chanelle Pacheco DO  Hospitalist Service  [facility]  Securely message with [email or web address] (more info)  Text page via [employer] Paging/Directory "   ______________________________________________________________________    Interval History   Doing okay; planning on road testing todayf or further hr eval    Physical Exam   Vital Signs: Temp: 98.4  F (36.9  C) Temp src: Oral BP: 117/74 Pulse: (!) 144   Resp: 20 SpO2: 91 % O2 Device: Oxymask Oxygen Delivery: 3 LPM  Weight: 296 lbs 8 oz    General: Very pleasant female , NAD sitting up in chair  HEENT:  Sclerae anicteric.  Mucous membranes moist.  Cardiac: irregular, rate controlled  Respiratory: Normal work of breathing. Decreased breath sounds at bases  Musculoskeletal: Moving all extremities appropriately.mild trace edema  Skin: No rashes or abrasions on exposed skin.  Neurologic: Alert. No gross focal deficits.   Psychologic: Appropriate mood and affect.    Medical Decision Making       50 MINUTES SPENT BY ME on the date of service doing chart review, history, exam, documentation & further activities per the note.      Data     I have personally reviewed the following data over the past 24 hrs:    N/A  \   N/A   / N/A     140 100 19.8 /  123 (H)   4.1 31 (H) 0.82 \       Imaging results reviewed over the past 24 hrs:   No results found for this or any previous visit (from the past 24 hour(s)).

## 2024-02-19 NOTE — PROGRESS NOTES
"Cardiology Progress Note  Sera Quach APRN, CNP          Assessment and Plan:     Admit (2/14/24) with progressive SOB  Evidence of rapid A-fib, acute HFpEF exacerbation    PMH:  aortic stenosis, former smoker, COPD, pre-diabetes, hypertension, hyperlipidemia, morbid obesity, likely untreated sleep apnea    NEW Paroxysmal Atrial Fibrillation with RVR  -ongoing A-fib since admission  -IV Diltiazem stopped due to 4 second pause  -on Diltiazem 30 mg QID, Metoprolol 25 mg BID, Digoxin  HRs mainly  bpm, up to 150s at times with activity   Pauses up to 2.4 seconds during sleep  -consider ADAMARIS/DCCV tomorrow vs as outpatient  -will ambulate in jeffrey and record heart rate  -Eliquis started  (CHADS2 Vasc score: 4)    Acute HFpEF exacerbation  BNP: 2000 / pulmonary edema on CXR  -LVEF 55-60%, normal RV function, mild LAE, 1+ MR  -improved symptoms with IV diuresis  -weight down 14 lbs / (-7300 total OP)  -continue diuresis    Mild-Moderate Aortic Stenosis  -mean gradient 17 mmHg    Possible Sleep Apnea  -outpatient sleep evaluation recommended    Morbid Obesity               Interval History:     Up in chair  Denies CP, palpitations  SOB improved  Slept with CPAP                Medications:      apixaban ANTICOAGULANT  5 mg Oral BID    atorvastatin  10 mg Oral QPM    cetirizine  10 mg Oral Daily    digoxin  125 mcg Oral Daily    diltiazem  30 mg Oral Q6H SANTI    furosemide  40 mg Oral Daily    metoprolol tartrate  25 mg Oral BID    sodium chloride (PF)  3 mL Intracatheter Q8H            Physical Exam:   Blood pressure 117/74, pulse (!) 144, temperature 98.4  F (36.9  C), temperature source Oral, resp. rate 20, height 1.549 m (5' 1\"), weight 134.5 kg (296 lb 8 oz), last menstrual period 12/14/2020, SpO2 91%.  Wt Readings from Last 3 Encounters:   02/19/24 134.5 kg (296 lb 8 oz)   11/27/23 135.2 kg (298 lb)   01/21/21 127 kg (280 lb)     I/O last 3 completed shifts:  In: 1200 [P.O.:1200]  Out: 1400 [Urine:1400]    CONST:  " "alert and oriented    LUNGS: CTA  No wheezing  CARDIO:  Irreg, Irreg  distant heart tones  ABD:  obese  EXT:  trace ankle edema           Data:   TELE:  A-fib    CBC  Recent Labs   Lab 02/17/24  0611 02/15/24  0807 02/15/24  0636   WBC  --  9.1 9.1   HGB  --  12.1 11.8    254 265       BMP  Recent Labs   Lab 02/19/24  0611 02/18/24  0632 02/17/24  0611 02/16/24  0625    142 142 143   POTASSIUM 4.1 4.1 4.3 4.6   CHLORIDE 100 101 101 105   KRISTA 8.4* 8.5* 8.3* 8.8   CO2 31* 33* 29 24   BUN 19.8 28.7* 32.8* 26.7*   CR 0.82 1.01* 1.17* 0.97*   * 108* 118* 127*     Recent Labs   Lab Test 02/15/24  0636   CHOL 145   HDL 36*   LDL 87   TRIG 108       TROP  No results found for: \"TROPI\", \"TROPONIN\", \"TROPR\"    BNP  Recent Labs   Lab 02/15/24  0636   NTBNPI 2,007*            "

## 2024-02-19 NOTE — PLAN OF CARE
"  Problem: Adult Inpatient Plan of Care  Goal: Plan of Care Review  Description: The Plan of Care Review/Shift note should be completed every shift.  The Outcome Evaluation is a brief statement about your assessment that the patient is improving, declining, or no change.  This information will be displayed automatically on your shift  note.  Outcome: Progressing  Goal: Patient-Specific Goal (Individualized)  Description: You can add care plan individualizations to a care plan. Examples of Individualization might be:  \"Parent requests to be called daily at 9am for status\", \"I have a hard time hearing out of my right ear\", or \"Do not touch me to wake me up as it startles  me\".  Outcome: Progressing  Goal: Absence of Hospital-Acquired Illness or Injury  Outcome: Progressing  Intervention: Identify and Manage Fall Risk  Recent Flowsheet Documentation  Taken 2/19/2024 1238 by Tyrell Aguilera RN  Safety Promotion/Fall Prevention:   assistive device/personal items within reach   clutter free environment maintained   nonskid shoes/slippers when out of bed   patient and family education   safety round/check completed  Taken 2/19/2024 0823 by Tyrell Aguilera RN  Safety Promotion/Fall Prevention:   activity supervised   assistive device/personal items within reach   clutter free environment maintained   increased rounding and observation   nonskid shoes/slippers when out of bed   patient and family education   safety round/check completed   room organization consistent  Intervention: Prevent and Manage VTE (Venous Thromboembolism) Risk  Recent Flowsheet Documentation  Taken 2/19/2024 0823 by Tyrell Aguilera RN  VTE Prevention/Management: SCDs (sequential compression devices) off  Goal: Optimal Comfort and Wellbeing  Outcome: Progressing  Intervention: Monitor Pain and Promote Comfort  Recent Flowsheet Documentation  Taken 2/19/2024 0819 by Tyrell Aguilera RN  Pain Management Interventions: medication (see " MAR)  Goal: Readiness for Transition of Care  Outcome: Progressing     Problem: Gas Exchange Impaired  Goal: Optimal Gas Exchange  Outcome: Progressing  Intervention: Optimize Oxygenation and Ventilation  Recent Flowsheet Documentation  Taken 2/19/2024 0823 by Tyrell Aguilera RN  Airway/Ventilation Management: airway patency maintained     Problem: Fall Injury Risk  Goal: Absence of Fall and Fall-Related Injury  Outcome: Progressing  Intervention: Identify and Manage Contributors  Recent Flowsheet Documentation  Taken 2/19/2024 0823 by Tyrell Aguilera RN  Medication Review/Management: medications reviewed  Intervention: Promote Injury-Free Environment  Recent Flowsheet Documentation  Taken 2/19/2024 1238 by Tyrell Aguilera RN  Safety Promotion/Fall Prevention:   assistive device/personal items within reach   clutter free environment maintained   nonskid shoes/slippers when out of bed   patient and family education   safety round/check completed  Taken 2/19/2024 0823 by Tyrell Aguilera RN  Safety Promotion/Fall Prevention:   activity supervised   assistive device/personal items within reach   clutter free environment maintained   increased rounding and observation   nonskid shoes/slippers when out of bed   patient and family education   safety round/check completed   room organization consistent     Problem: Heart Failure  Goal: Optimal Cardiac Output  Outcome: Progressing  Goal: Stable Heart Rate and Rhythm  Outcome: Progressing  Goal: Optimal Functional Ability  Outcome: Progressing  Intervention: Optimize Functional Ability  Recent Flowsheet Documentation  Taken 2/19/2024 0823 by Tyrell Aguilera RN  Activity Management:   activity adjusted per tolerance   ambulated to bathroom   ambulated in room   up in chair  Goal: Effective Oxygenation and Ventilation  Outcome: Progressing  Intervention: Promote Airway Secretion Clearance  Recent Flowsheet Documentation  Taken 2/19/2024 0823 by Tyrell Agiulera  RN  Cough And Deep Breathing: done independently per patient  Activity Management:   activity adjusted per tolerance   ambulated to bathroom   ambulated in room   up in chair  Intervention: Optimize Oxygenation and Ventilation  Recent Flowsheet Documentation  Taken 2/19/2024 0823 by Tyrell Aguilera RN  Airway/Ventilation Management: airway patency maintained   Goal Outcome Evaluation:  Heart Failure Care Map  GOALS TO BE MET BEFORE DISCHARGE:    1. Decrease congestion and/or edema with diuretic therapy to achieve near optimal volume status.     Dyspnea improved: No, further care required to meet this goal. Please explain still requiring 2L oxymask    Edema improved: No, further care required to meet this goal. Please explain Improved. But patient still on po lasix         Last 24 hour I/O:   Intake/Output Summary (Last 24 hours) at 2/19/2024 1340  Last data filed at 2/19/2024 1238  Gross per 24 hour   Intake 1200 ml   Output 2100 ml   Net -900 ml           Net I/O and Weights since admission:   01/20 1500 - 02/19 1459  In: 4195 [P.O.:4080; I.V.:115]  Out: 15602 [Urine:60909]  Net: -8030     Vitals:    02/14/24 1435 02/14/24 2133 02/15/24 0603 02/16/24 0645   Weight: 127 kg (280 lb) 140.6 kg (310 lb) 140.2 kg (309 lb 1.4 oz) 140.1 kg (308 lb 12.8 oz)    02/17/24 0601 02/17/24 0648 02/18/24 0622 02/19/24 0451   Weight: 139.1 kg (306 lb 10.6 oz) 137.8 kg (303 lb 11.2 oz) 134.7 kg (296 lb 14.4 oz) 134.5 kg (296 lb 8 oz)       2.  O2 sats > 90% on room air, or at prior home O2 therapy level.      Able to wean O2 this shift to keep sats above 90%?: No, further care required to meet this goal. Please explain 2L oxymask    Does patient use Home O2? No          Current oxygenation status:   SpO2: 91 %     O2 Device: Oxymask, Oxygen Delivery: 3 LPM    3.  Tolerates ambulation and mobility near baseline.     Ambulation: No, further care required to meet this goal. Please explain needs to ambulate halls   Times patient  ambulated with staff this shift: 1    Please review the Heart Failure Care Map for additional HF goal outcomes.    Tyrell Aguilera RN  2/19/2024       Vss HR afib cvr to rvr when up moving pt hr up to 140's just to the br. Tylenol and zyrtec for HA with relief. LS dim. Sats 92-94% on 2L oxymask. Npo after midnight.

## 2024-02-19 NOTE — PLAN OF CARE
Pt is alert and oriented x4. P denies pain or discomfort. VSS. Pt is on acute distress noted. Pt is on 02 2L NC. Pt denies shortness of breath. Pt is on Tele monitoring Afib. Pt is morbid obesed. Pt is on potassium and magnesium. Recheck in the morning. Pt HR was stable during the shift in the 60's. Pt has trace edema BLE. Pt is assist of one in transfer and cares. Pt is sleeping in bed. Bed alarm in place and meliza light within reach. Will continue to monitor and assess pt.

## 2024-02-19 NOTE — PLAN OF CARE
Care from 1500 - 1900:    Goal Outcome Evaluation:      Plan of Care Reviewed With: patient     Pt A/O x4, VSS except elevated HR (110-140s), on meds (see MAR), pt is asymptomatic. Denies pain, c/p, n/v, dizziness. LS dim, SPRING, O2 sats stable on 2L w/ oxymask. PIV patent and SL. On tele: A-fib RVR. K+ and Mg protocols WNL, AM rechecks. Cr improved. Up SBA to bathroom. Up in chair for dinner. Will continue POC.

## 2024-02-19 NOTE — PLAN OF CARE
Goal Outcome Evaluation:      Plan of Care Reviewed With: patient    Overall Patient Progress: no changeOverall Patient Progress: no change     Care from 3147-2225  Inpatient Progress Note    ORIENTATION: Aox4  VSS. HR 90s; was around 100s-120s intermittently but not sustaining; pt is asymptomatic; denies palpitations.  PAIN: Denies pain, CP, n/v, SOB.  O2: 2L oxymask  TELE: Afib VVR  GI/: Continent.  ACTIVITY: SBA, bed alarm on.  DIET: Low fat/Na.  LINES/DRAINS: New L PIV placed this shift; SL.  PROTOCOLS: K, Mg; rechecks for 0600  PLAN: Monitor HR; PRN IV metoprolol available for HR sustaining >110; did not need to administer this shift.

## 2024-02-20 ENCOUNTER — APPOINTMENT (OUTPATIENT)
Dept: CARDIOLOGY | Facility: CLINIC | Age: 54
End: 2024-02-20
Attending: INTERNAL MEDICINE
Payer: COMMERCIAL

## 2024-02-20 LAB
ANION GAP SERPL CALCULATED.3IONS-SCNC: 6 MMOL/L (ref 7–15)
BUN SERPL-MCNC: 13.6 MG/DL (ref 6–20)
CALCIUM SERPL-MCNC: 8.5 MG/DL (ref 8.6–10)
CHLORIDE SERPL-SCNC: 101 MMOL/L (ref 98–107)
CREAT SERPL-MCNC: 0.76 MG/DL (ref 0.51–0.95)
DEPRECATED HCO3 PLAS-SCNC: 32 MMOL/L (ref 22–29)
EGFRCR SERPLBLD CKD-EPI 2021: >90 ML/MIN/1.73M2
GLUCOSE BLDC GLUCOMTR-MCNC: 128 MG/DL (ref 70–99)
GLUCOSE SERPL-MCNC: 122 MG/DL (ref 70–99)
LVEF ECHO: NORMAL
MAGNESIUM SERPL-MCNC: 2.2 MG/DL (ref 1.7–2.3)
PLATELET # BLD AUTO: 247 10E3/UL (ref 150–450)
POTASSIUM SERPL-SCNC: 4.3 MMOL/L (ref 3.4–5.3)
SODIUM SERPL-SCNC: 139 MMOL/L (ref 135–145)

## 2024-02-20 PROCEDURE — 250N000009 HC RX 250

## 2024-02-20 PROCEDURE — 85049 AUTOMATED PLATELET COUNT: CPT | Performed by: NURSE PRACTITIONER

## 2024-02-20 PROCEDURE — 250N000013 HC RX MED GY IP 250 OP 250 PS 637: Performed by: INTERNAL MEDICINE

## 2024-02-20 PROCEDURE — 250N000011 HC RX IP 250 OP 636: Performed by: INTERNAL MEDICINE

## 2024-02-20 PROCEDURE — 250N000013 HC RX MED GY IP 250 OP 250 PS 637: Performed by: STUDENT IN AN ORGANIZED HEALTH CARE EDUCATION/TRAINING PROGRAM

## 2024-02-20 PROCEDURE — 36415 COLL VENOUS BLD VENIPUNCTURE: CPT | Performed by: NURSE PRACTITIONER

## 2024-02-20 PROCEDURE — 250N000013 HC RX MED GY IP 250 OP 250 PS 637: Performed by: NURSE PRACTITIONER

## 2024-02-20 PROCEDURE — 93320 DOPPLER ECHO COMPLETE: CPT | Mod: 26 | Performed by: INTERNAL MEDICINE

## 2024-02-20 PROCEDURE — 99232 SBSQ HOSP IP/OBS MODERATE 35: CPT | Mod: 25 | Performed by: NURSE PRACTITIONER

## 2024-02-20 PROCEDURE — 80048 BASIC METABOLIC PNL TOTAL CA: CPT | Performed by: INTERNAL MEDICINE

## 2024-02-20 PROCEDURE — 99232 SBSQ HOSP IP/OBS MODERATE 35: CPT | Performed by: STUDENT IN AN ORGANIZED HEALTH CARE EDUCATION/TRAINING PROGRAM

## 2024-02-20 PROCEDURE — 120N000001 HC R&B MED SURG/OB

## 2024-02-20 PROCEDURE — 93312 ECHO TRANSESOPHAGEAL: CPT | Mod: 26 | Performed by: INTERNAL MEDICINE

## 2024-02-20 PROCEDURE — 83735 ASSAY OF MAGNESIUM: CPT | Performed by: STUDENT IN AN ORGANIZED HEALTH CARE EDUCATION/TRAINING PROGRAM

## 2024-02-20 PROCEDURE — 999N000157 HC STATISTIC RCP TIME EA 10 MIN

## 2024-02-20 PROCEDURE — 93325 DOPPLER ECHO COLOR FLOW MAPG: CPT

## 2024-02-20 PROCEDURE — 93325 DOPPLER ECHO COLOR FLOW MAPG: CPT | Mod: 26 | Performed by: INTERNAL MEDICINE

## 2024-02-20 RX ORDER — GLYCOPYRROLATE 0.2 MG/ML
0.1 INJECTION, SOLUTION INTRAMUSCULAR; INTRAVENOUS ONCE
Status: COMPLETED | OUTPATIENT
Start: 2024-02-20 | End: 2024-02-20

## 2024-02-20 RX ORDER — LIDOCAINE HYDROCHLORIDE 20 MG/ML
SOLUTION OROPHARYNGEAL
Status: COMPLETED
Start: 2024-02-20 | End: 2024-02-20

## 2024-02-20 RX ORDER — SODIUM CHLORIDE 9 MG/ML
INJECTION, SOLUTION INTRAVENOUS CONTINUOUS PRN
Status: DISCONTINUED | OUTPATIENT
Start: 2024-02-20 | End: 2024-02-21

## 2024-02-20 RX ORDER — NALOXONE HYDROCHLORIDE 0.4 MG/ML
INJECTION, SOLUTION INTRAMUSCULAR; INTRAVENOUS; SUBCUTANEOUS
Status: DISCONTINUED
Start: 2024-02-20 | End: 2024-02-20 | Stop reason: WASHOUT

## 2024-02-20 RX ORDER — NALOXONE HYDROCHLORIDE 0.4 MG/ML
0.4 INJECTION, SOLUTION INTRAMUSCULAR; INTRAVENOUS; SUBCUTANEOUS
Status: DISCONTINUED | OUTPATIENT
Start: 2024-02-20 | End: 2024-02-21

## 2024-02-20 RX ORDER — FENTANYL CITRATE 50 UG/ML
INJECTION, SOLUTION INTRAMUSCULAR; INTRAVENOUS
Status: DISPENSED
Start: 2024-02-20 | End: 2024-02-20

## 2024-02-20 RX ORDER — FENTANYL CITRATE 50 UG/ML
50 INJECTION, SOLUTION INTRAMUSCULAR; INTRAVENOUS ONCE
Status: DISCONTINUED | OUTPATIENT
Start: 2024-02-20 | End: 2024-02-21

## 2024-02-20 RX ORDER — FENTANYL CITRATE 50 UG/ML
INJECTION, SOLUTION INTRAMUSCULAR; INTRAVENOUS PRN
Status: COMPLETED | OUTPATIENT
Start: 2024-02-20 | End: 2024-02-20

## 2024-02-20 RX ORDER — LIDOCAINE HYDROCHLORIDE 20 MG/ML
15 SOLUTION OROPHARYNGEAL ONCE
Status: COMPLETED | OUTPATIENT
Start: 2024-02-20 | End: 2024-02-20

## 2024-02-20 RX ORDER — POTASSIUM CHLORIDE 1500 MG/1
20 TABLET, EXTENDED RELEASE ORAL
Status: ACTIVE | OUTPATIENT
Start: 2024-02-20 | End: 2024-02-20

## 2024-02-20 RX ORDER — NALOXONE HYDROCHLORIDE 0.4 MG/ML
0.2 INJECTION, SOLUTION INTRAMUSCULAR; INTRAVENOUS; SUBCUTANEOUS
Status: DISCONTINUED | OUTPATIENT
Start: 2024-02-20 | End: 2024-02-21

## 2024-02-20 RX ORDER — FLUMAZENIL 0.1 MG/ML
0.2 INJECTION, SOLUTION INTRAVENOUS
Status: DISCONTINUED | OUTPATIENT
Start: 2024-02-20 | End: 2024-02-21

## 2024-02-20 RX ORDER — LIDOCAINE 40 MG/G
CREAM TOPICAL
Status: DISCONTINUED | OUTPATIENT
Start: 2024-02-20 | End: 2024-02-21

## 2024-02-20 RX ORDER — DEXTROSE MONOHYDRATE 25 G/50ML
9.5 INJECTION, SOLUTION INTRAVENOUS
Status: DISCONTINUED | OUTPATIENT
Start: 2024-02-20 | End: 2024-02-21

## 2024-02-20 RX ORDER — MAGNESIUM SULFATE HEPTAHYDRATE 40 MG/ML
2 INJECTION, SOLUTION INTRAVENOUS
Status: DISCONTINUED | OUTPATIENT
Start: 2024-02-20 | End: 2024-02-21 | Stop reason: HOSPADM

## 2024-02-20 RX ORDER — FLUMAZENIL 0.1 MG/ML
INJECTION, SOLUTION INTRAVENOUS
Status: DISCONTINUED
Start: 2024-02-20 | End: 2024-02-20 | Stop reason: WASHOUT

## 2024-02-20 RX ORDER — FENTANYL CITRATE 50 UG/ML
25 INJECTION, SOLUTION INTRAMUSCULAR; INTRAVENOUS
Status: DISCONTINUED | OUTPATIENT
Start: 2024-02-20 | End: 2024-02-21

## 2024-02-20 RX ORDER — GLYCOPYRROLATE 0.2 MG/ML
INJECTION, SOLUTION INTRAMUSCULAR; INTRAVENOUS
Status: DISPENSED
Start: 2024-02-20 | End: 2024-02-20

## 2024-02-20 RX ORDER — DILTIAZEM HYDROCHLORIDE 240 MG/1
240 CAPSULE, COATED, EXTENDED RELEASE ORAL DAILY
Status: DISCONTINUED | OUTPATIENT
Start: 2024-02-20 | End: 2024-02-21 | Stop reason: HOSPADM

## 2024-02-20 RX ORDER — METOPROLOL SUCCINATE 25 MG/1
25 TABLET, EXTENDED RELEASE ORAL
Status: DISCONTINUED | OUTPATIENT
Start: 2024-02-20 | End: 2024-02-21 | Stop reason: HOSPADM

## 2024-02-20 RX ADMIN — FUROSEMIDE 40 MG: 40 TABLET ORAL at 08:28

## 2024-02-20 RX ADMIN — GLYCOPYRROLATE 0.1 MG: 0.2 INJECTION, SOLUTION INTRAMUSCULAR; INTRAVENOUS at 10:45

## 2024-02-20 RX ADMIN — METOPROLOL TARTRATE 25 MG: 25 TABLET, FILM COATED ORAL at 08:28

## 2024-02-20 RX ADMIN — FENTANYL CITRATE 25 MCG: 50 INJECTION INTRAMUSCULAR; INTRAVENOUS at 11:46

## 2024-02-20 RX ADMIN — METOPROLOL SUCCINATE 25 MG: 25 TABLET, EXTENDED RELEASE ORAL at 12:50

## 2024-02-20 RX ADMIN — CETIRIZINE HYDROCHLORIDE 10 MG: 10 TABLET, FILM COATED ORAL at 08:28

## 2024-02-20 RX ADMIN — DILTIAZEM HYDROCHLORIDE 240 MG: 240 CAPSULE, COATED, EXTENDED RELEASE ORAL at 12:49

## 2024-02-20 RX ADMIN — ATORVASTATIN CALCIUM 10 MG: 10 TABLET, FILM COATED ORAL at 21:07

## 2024-02-20 RX ADMIN — APIXABAN 5 MG: 5 TABLET, FILM COATED ORAL at 08:28

## 2024-02-20 RX ADMIN — LIDOCAINE HYDROCHLORIDE 30 ML: 20 SOLUTION ORAL at 10:46

## 2024-02-20 RX ADMIN — MIDAZOLAM 0.5 MG: 1 INJECTION INTRAMUSCULAR; INTRAVENOUS at 11:46

## 2024-02-20 RX ADMIN — APIXABAN 5 MG: 5 TABLET, FILM COATED ORAL at 21:07

## 2024-02-20 RX ADMIN — LIDOCAINE HYDROCHLORIDE 30 ML: 20 SOLUTION OROPHARYNGEAL at 10:46

## 2024-02-20 RX ADMIN — FENTANYL CITRATE 25 MCG: 50 INJECTION INTRAMUSCULAR; INTRAVENOUS at 11:52

## 2024-02-20 RX ADMIN — TOPICAL ANESTHETIC 1 ML: 200 SPRAY DENTAL; PERIODONTAL at 11:31

## 2024-02-20 RX ADMIN — DIGOXIN 125 MCG: 125 TABLET ORAL at 15:04

## 2024-02-20 RX ADMIN — MIDAZOLAM 0.5 MG: 1 INJECTION INTRAMUSCULAR; INTRAVENOUS at 11:50

## 2024-02-20 ASSESSMENT — ACTIVITIES OF DAILY LIVING (ADL)
ADLS_ACUITY_SCORE: 28
ADLS_ACUITY_SCORE: 27
ADLS_ACUITY_SCORE: 27
ADLS_ACUITY_SCORE: 28
ADLS_ACUITY_SCORE: 27
ADLS_ACUITY_SCORE: 28
ADLS_ACUITY_SCORE: 28
ADLS_ACUITY_SCORE: 27
ADLS_ACUITY_SCORE: 28
ADLS_ACUITY_SCORE: 27

## 2024-02-20 NOTE — PLAN OF CARE
"Goal Outcome Evaluation:      Plan of Care Reviewed With: patient    Overall Patient Progress: no change    Outcome Evaluation: .    Patient calm and cooperative. A/O x4. VS stable, BP's are on softer side per patient's normal. Maintaining O2 sats >90% on RA. Infrequent cough. Lung sounds are diminished.  Denies SOB and chest pain. HR has gotten up to 180's with activity but doesn't sustain. Tele is Afib RVR. Plan to cardiovert and Echo ADAMARIS tomorrow, NPO at midnight. Patient denies pain. Denies headaches. Left PIV inserted by RN flyer, CDI and saline locked. Stand by assist. Continue POC.    Problem: Adult Inpatient Plan of Care  Goal: Plan of Care Review  Description: The Plan of Care Review/Shift note should be completed every shift.  The Outcome Evaluation is a brief statement about your assessment that the patient is improving, declining, or no change.  This information will be displayed automatically on your shift  note.  Outcome: Progressing  Flowsheets (Taken 2/19/2024 2224)  Outcome Evaluation: .  Plan of Care Reviewed With: patient  Overall Patient Progress: no change  Goal: Patient-Specific Goal (Individualized)  Description: You can add care plan individualizations to a care plan. Examples of Individualization might be:  \"Parent requests to be called daily at 9am for status\", \"I have a hard time hearing out of my right ear\", or \"Do not touch me to wake me up as it startles  me\".  Outcome: Progressing  Goal: Absence of Hospital-Acquired Illness or Injury  Outcome: Progressing  Intervention: Prevent and Manage VTE (Venous Thromboembolism) Risk  Recent Flowsheet Documentation  Taken 2/19/2024 1817 by Vesna Colon RN  VTE Prevention/Management: SCDs (sequential compression devices) off  Goal: Optimal Comfort and Wellbeing  Outcome: Progressing  Goal: Readiness for Transition of Care  Outcome: Progressing     Problem: Gas Exchange Impaired  Goal: Optimal Gas Exchange  Outcome: Progressing     Problem: " Fall Injury Risk  Goal: Absence of Fall and Fall-Related Injury  Outcome: Progressing     Problem: Heart Failure  Goal: Optimal Cardiac Output  Outcome: Progressing  Goal: Stable Heart Rate and Rhythm  Outcome: Progressing  Goal: Optimal Functional Ability  Outcome: Progressing  Goal: Effective Oxygenation and Ventilation  Outcome: Progressing

## 2024-02-20 NOTE — PRE-PROCEDURE
GENERAL PRE-PROCEDURE:   Procedure:  ADAMARIS cardioversion    Written consent obtained?: Yes    Risks and benefits: Risks, benefits and alternatives were discussed    Consent given by:  Patient  Patient states understanding of procedure being performed: Yes    Patient's understanding of procedure matches consent: Yes    Procedure consent matches procedure scheduled: Yes    Expected level of sedation:  Moderate  Appropriately NPO:  Yes  Mallampati  :  Grade 2- soft palate, base of uvula, tonsillar pillars, and portion of posterior pharyngeal wall visible  Lungs:  Lungs clear with good breath sounds bilaterally  Heart:  Normal heart sounds and rate and a-fib  History & Physical reviewed:  History and physical reviewed and no updates needed  Statement of review:  I have reviewed the lab findings, diagnostic data, medications, and the plan for sedation    Risk benefits of ADAMARIS cardioversion, with risks including but not limited to risk of sedation, esophageal injury, pericardial versions stroke, post cardioversion arrhythmia, need for emergent pacemaker implantation, postcardioversion skin burns, risk of anesthesia were discussed in detail with patient.  Patient has been appropriately n.p.o.  She is on apixaban and had this morning dose as well.  Patient understands the rational of ADAMARIS cardioversion, the risk and now and wishes to proceed with it

## 2024-02-20 NOTE — PLAN OF CARE
Pt is stable, but with uncontrolled A Fib rates 100s-160.  Attempted cardioversion, but unable due to CESAR findings of thrombus in left atrial appendage.  Pt denies shortness of breath and chest pain.  Advanced diet after CESAR without difficulty.  Pt had a shower, educated on medication regimen. Voiding without difficulty.  Problem: Heart Failure  Goal: Stable Heart Rate and Rhythm  Intervention: Monitor and Manage Cardiac Rhythm Effect  Recent Flowsheet Documentation  Taken 2/20/2024 1604 by Luz Elena Sam RN  Dysrhythmia Management: (medications and planned procedure-cardioversion/cesar planned today) other (see comments)  Taken 2/20/2024 0828 by Luz Elena Sam RN  Dysrhythmia Management: (medications and planned procedure-cardioversion/cesar planned today) other (see comments)   Goal Outcome Evaluation:      Plan of Care Reviewed With: patient, family    Overall Patient Progress: no change

## 2024-02-20 NOTE — PROGRESS NOTES
"Cardiology Progress Note  Sera Quach APRN, CNP          Assessment and Plan:     Admit (2/14/24) with progressive SOB  Evidence of rapid A-fib, acute HFpEF exacerbation    PMH:  aortic stenosis, former smoker, COPD, pre-diabetes, hypertension, hyperlipidemia, morbid obesity, likely untreated sleep apnea    NEW Paroxysmal Atrial Fibrillation with RVR, Asymptomatic  -ongoing A-fib since admission  -HR poorly controlled with Diltiazem + Metoprolol + Digoxin, with bradycardia/pauses during sleep  -plan for ADAMARIS/DCCV today  -Eliquis started  (CHADS2 Vasc score: 4)    Acute HFpEF exacerbation  BNP: 2000 / pulmonary edema on CXR  -LVEF 55-60%, normal RV function, mild LAE, 1+ MR  -improved symptoms with IV diuresis  -weight down 17 lbs / (-9100 total OP)  -continue diuresis    Mild-Moderate Aortic Stenosis  -mean gradient 17 mmHg    Possible Sleep Apnea  -wearing CPAP here  -outpatient sleep evaluation recommended    Morbid Obesity               Interval History:     Using CPAP at night  Does not feel her A-fib  Breathing improved  No CP                Medications:      apixaban ANTICOAGULANT  5 mg Oral BID    atorvastatin  10 mg Oral QPM    cetirizine  10 mg Oral Daily    digoxin  125 mcg Oral Daily    diltiazem  30 mg Oral Q6H SANTI    furosemide  40 mg Oral Daily    metoprolol tartrate  25 mg Oral BID    sodium chloride (PF)  3 mL Intracatheter Q8H            Physical Exam:   Blood pressure 123/89, pulse 96, temperature 98.2  F (36.8  C), temperature source Oral, resp. rate 16, height 1.549 m (5' 1\"), weight 133 kg (293 lb 3.2 oz), last menstrual period 12/14/2020, SpO2 98%.  Wt Readings from Last 3 Encounters:   02/20/24 133 kg (293 lb 3.2 oz)   11/27/23 135.2 kg (298 lb)   01/21/21 127 kg (280 lb)     I/O last 3 completed shifts:  In: 300 [P.O.:300]  Out: 2100 [Urine:2100]    CONST:  alert and oriented    LUNGS: CTA  No wheezing  CARDIO:  Irreg, Irreg  distant heart tones  ABD:  obese  EXT:  trace ankle edema           " "Data:   TELE:  A-fib    CBC  Recent Labs   Lab 02/20/24  0543 02/17/24  0611 02/15/24  0807 02/15/24  0636   WBC  --   --  9.1 9.1   HGB  --   --  12.1 11.8    238 254 265       BMP  Recent Labs   Lab 02/20/24  0543 02/20/24  0244 02/19/24  0611 02/18/24  0632 02/17/24  0611     --  140 142 142   POTASSIUM 4.3  --  4.1 4.1 4.3   CHLORIDE 101  --  100 101 101   KRISTA 8.5*  --  8.4* 8.5* 8.3*   CO2 32*  --  31* 33* 29   BUN 13.6  --  19.8 28.7* 32.8*   CR 0.76  --  0.82 1.01* 1.17*   * 128* 123* 108* 118*     Recent Labs   Lab Test 02/15/24  0636   CHOL 145   HDL 36*   LDL 87   TRIG 108       TROP  No results found for: \"TROPI\", \"TROPONIN\", \"TROPR\"    BNP  Recent Labs   Lab 02/15/24  0636   NTBNPI 2,007*            "

## 2024-02-20 NOTE — PROGRESS NOTES
[facility]    Medicine Progress Note - Hospitalist Service    Date of Admission:  2/14/2024    Assessment & Plan     Shilpa Miramontes is a 53-year-old female with a medical history of non-rheumatic aortic valve stenosis, undiagnosed COPD, former smoker, pre-diabetes, hypertension, hyperlipidemia, morbid obesity, cholecystectomy, and RSV infection in November 2023 who presented to the [facility] emergency department for the evaluation of shortness of breath and she was found to be in a new onset of atrial fibrillation.   ADAMARIS 2/20 with findings of left atrial appendage thrombus, plan for continued rate control with metoprolol and diltiazem, with plan for ADAMARIS with cardioversion in 1 months time following anticoagulation.     Acute hypoxic respiratory failure, multifactorial   Acute congestive heart failure/HFpEF, mild/moderate Aortic Stenosis, AFRVR, hypoventilation syndrome  Ongoing worsening SOB; issues seemed to begin in 11/2023 after RSV and subsequent repeat viral infections but suspect some in part exacerbations of likely heart failure. BNP elevated on admission with pulmonary edema; antibiotics and steroids held due to suspicion of heart failure exacerbation as well as uncontrolled atrial fibrillation, superimposed with probable mild/moderate aortic stenosis, as well as hypoventilation syndrome, dietary considerations. TTE with EF 55-60% with mild/moderate aortic stenosis, LAE.   -now on PO lasix as more than 7 liters net down.   -SOB much improved, but still requiring supplemental oxygen;   -recommend outpatient sleep study     New onset atrial fibrillation with RVR  Unclear onset, has had symptoms of palpitations for a while so could be ongoing for some time. Have trialed numerous combinations of IV/PO AV batsheva blocking agents still with difficult to control RVR with intermittent bradycardia/pauses.  -ADAMARIS 2/20 with findings of DESIRE thrombus, planning for outpatient ADAMARIS/cardioversion  "in one month after continued anticoagulation.   -consolidated to toprol 25 mg bid and dilt xr 240 mg/day today, will assess response and tolerability. Has had pauses at night, likely also exacerbated by CATARINO.     Possible COPD  Hx of tobacco use previously; ongoing wheezing could both be 2/2 CHF and pulm edema vs COPD, however she does think that the inhalers have helped.   -continue prn duonebs (though may contribute to tachyarrhythmias)    Likely seasonal/allergic rhinitis  Headache/congestion in AM with symptoms ongoing for several months, will start cetirizine 5 mg daily (can increase to 10 mg), already on flonase, also recommend outpatient sleep study for CATARINO/overnight oxygenation evaluation.    Morbidly obese, hypoventilation syndrome, suspected sleep apnea  Patient with a BMI of 56.1 as of 2/18/24.  Some of that is due to fluid.  Given her shortness of breath I suspect she has a component of hypoventilation syndrome.  Patient likely would benefit from an outpatient sleep study at some point.     Hyperlipidemia  Continue with PTA Atorvastatin     Prediabetes  Blood sugars have been 108-118.  Her hemoglobin A1c is 6.2.  At this point we will stop following Accu-Cheks           Hypokalemia/hypomagnesemia  With the patient's ongoing diuresis will place the patient on replacement protocol.          Diet: NPO for Medical/Clinical Reasons Except for: Meds    DVT Prophylaxis: DOAC  Xiao Catheter: Not present  Lines: None     Cardiac Monitoring: ACTIVE order. Indication: Tachyarrhythmias, acute (48 hours)  Code Status: Full Code      Clinically Significant Risk Factors                         # Severe Obesity: Estimated body mass index is 55.4 kg/m  as calculated from the following:    Height as of this encounter: 1.549 m (5' 1\").    Weight as of this encounter: 133 kg (293 lb 3.2 oz).             Disposition Plan     Expected Discharge Date: 02/20/2024                  Chanelle Pacheco DO  Hospitalist Service  M " [facility]  Securely message with [employer] (more info)  Text page via [employer] Paging/Directory   ______________________________________________________________________    Interval History   Relatively asymptomatic     Physical Exam   Vital Signs: Temp: 98.2  F (36.8  C) Temp src: Oral BP: 123/89 Pulse: 96   Resp: 16 SpO2: 98 % O2 Device: None (Room air) Oxygen Delivery: 2 LPM  Weight: 293 lbs 3.2 oz    General: Very pleasant female , NAD sitting up in chair  HEENT:  Sclerae anicteric.  Mucous membranes moist.  Cardiac: irregular, rate controlled  Respiratory: Normal work of breathing. Decreased breath sounds at bases  Musculoskeletal: Moving all extremities appropriately.mild trace edema  Skin: No rashes or abrasions on exposed skin.  Neurologic: Alert. No gross focal deficits.   Psychologic: Appropriate mood and affect.    Medical Decision Making       35 MINUTES SPENT BY ME on the date of service doing chart review, history, exam, documentation & further activities per the note.      Data     I have personally reviewed the following data over the past 24 hrs:    N/A  \   N/A   / 247     139 101 13.6 /  122 (H)   4.3 32 (H) 0.76 \       Imaging results reviewed over the past 24 hrs:   No results found for this or any previous visit (from the past 24 hour(s)).

## 2024-02-20 NOTE — PLAN OF CARE
Care from [id number]      Admit Date: 2/14/2024  Admitting Diagnosis: Dyspnea, Afib RVR, CHF  Pertinent History: Undiagnosed COPD, former smoker, Pre-DM, HTN, HLD    Neuro: Alert and Oriented x4  Activity: are SBA with no assistive devices   Telemetry Monitoring: Yes, Afib CVR PAC   Pain: denying pain.  Labs / Tests: Mag and K protocol. No replacement given. Blood glucose checks: 123  GI: bowel sounds audible. Bmx1. Denies nausea.   : voiding spontaneously ambulating to bathroom  O2: 2 LPM oxymask O2 saturation: 92-93%  LDA's: Peripheral  Fluids: is Saline locked.  Diet: NPO  Consults: Cardiology  Discharge Disposition:  D

## 2024-02-21 ENCOUNTER — APPOINTMENT (OUTPATIENT)
Dept: CARDIOLOGY | Facility: CLINIC | Age: 54
End: 2024-02-21
Attending: PHYSICIAN ASSISTANT
Payer: COMMERCIAL

## 2024-02-21 VITALS
DIASTOLIC BLOOD PRESSURE: 82 MMHG | SYSTOLIC BLOOD PRESSURE: 102 MMHG | WEIGHT: 293 LBS | HEART RATE: 78 BPM | BODY MASS INDEX: 55.32 KG/M2 | TEMPERATURE: 98.3 F | OXYGEN SATURATION: 94 % | RESPIRATION RATE: 20 BRPM | HEIGHT: 61 IN

## 2024-02-21 LAB
ANION GAP SERPL CALCULATED.3IONS-SCNC: 10 MMOL/L (ref 7–15)
BUN SERPL-MCNC: 14.3 MG/DL (ref 6–20)
CALCIUM SERPL-MCNC: 8.6 MG/DL (ref 8.6–10)
CHLORIDE SERPL-SCNC: 102 MMOL/L (ref 98–107)
CREAT SERPL-MCNC: 0.8 MG/DL (ref 0.51–0.95)
DEPRECATED HCO3 PLAS-SCNC: 29 MMOL/L (ref 22–29)
EGFRCR SERPLBLD CKD-EPI 2021: 88 ML/MIN/1.73M2
GLUCOSE SERPL-MCNC: 117 MG/DL (ref 70–99)
HOLD SPECIMEN: NORMAL
MAGNESIUM SERPL-MCNC: 2.1 MG/DL (ref 1.7–2.3)
POTASSIUM SERPL-SCNC: 4.1 MMOL/L (ref 3.4–5.3)
SODIUM SERPL-SCNC: 141 MMOL/L (ref 135–145)

## 2024-02-21 PROCEDURE — 250N000013 HC RX MED GY IP 250 OP 250 PS 637: Performed by: STUDENT IN AN ORGANIZED HEALTH CARE EDUCATION/TRAINING PROGRAM

## 2024-02-21 PROCEDURE — 36415 COLL VENOUS BLD VENIPUNCTURE: CPT | Performed by: INTERNAL MEDICINE

## 2024-02-21 PROCEDURE — 250N000013 HC RX MED GY IP 250 OP 250 PS 637: Performed by: INTERNAL MEDICINE

## 2024-02-21 PROCEDURE — 93272 ECG/REVIEW INTERPRET ONLY: CPT | Performed by: INTERNAL MEDICINE

## 2024-02-21 PROCEDURE — 99232 SBSQ HOSP IP/OBS MODERATE 35: CPT | Mod: 25 | Performed by: PHYSICIAN ASSISTANT

## 2024-02-21 PROCEDURE — 83735 ASSAY OF MAGNESIUM: CPT | Performed by: STUDENT IN AN ORGANIZED HEALTH CARE EDUCATION/TRAINING PROGRAM

## 2024-02-21 PROCEDURE — 80048 BASIC METABOLIC PNL TOTAL CA: CPT | Performed by: INTERNAL MEDICINE

## 2024-02-21 PROCEDURE — 93270 REMOTE 30 DAY ECG REV/REPORT: CPT

## 2024-02-21 PROCEDURE — 99239 HOSP IP/OBS DSCHRG MGMT >30: CPT | Performed by: STUDENT IN AN ORGANIZED HEALTH CARE EDUCATION/TRAINING PROGRAM

## 2024-02-21 RX ORDER — DIGOXIN 125 MCG
125 TABLET ORAL DAILY
Qty: 30 TABLET | Refills: 0 | Status: SHIPPED | OUTPATIENT
Start: 2024-02-22 | End: 2024-02-21

## 2024-02-21 RX ORDER — ATORVASTATIN CALCIUM 10 MG/1
10 TABLET, FILM COATED ORAL EVERY EVENING
Qty: 30 TABLET | Refills: 0 | Status: SHIPPED | OUTPATIENT
Start: 2024-02-21 | End: 2024-02-21

## 2024-02-21 RX ORDER — METOPROLOL SUCCINATE 25 MG/1
25 TABLET, EXTENDED RELEASE ORAL 2 TIMES DAILY
Qty: 60 TABLET | Refills: 0 | Status: SHIPPED | OUTPATIENT
Start: 2024-02-21 | End: 2024-03-19

## 2024-02-21 RX ORDER — FUROSEMIDE 40 MG
40 TABLET ORAL DAILY
Qty: 30 TABLET | Refills: 0 | Status: SHIPPED | OUTPATIENT
Start: 2024-02-22 | End: 2024-03-19

## 2024-02-21 RX ORDER — DIGOXIN 125 MCG
125 TABLET ORAL DAILY
Qty: 30 TABLET | Refills: 0 | Status: SHIPPED | OUTPATIENT
Start: 2024-02-22 | End: 2024-03-19

## 2024-02-21 RX ORDER — DILTIAZEM HYDROCHLORIDE 240 MG/1
240 CAPSULE, COATED, EXTENDED RELEASE ORAL DAILY
Qty: 30 CAPSULE | Refills: 0 | Status: SHIPPED | OUTPATIENT
Start: 2024-02-22 | End: 2024-02-21

## 2024-02-21 RX ORDER — FUROSEMIDE 40 MG
40 TABLET ORAL DAILY
Qty: 30 TABLET | Refills: 0 | Status: SHIPPED | OUTPATIENT
Start: 2024-02-22 | End: 2024-02-21

## 2024-02-21 RX ORDER — METOPROLOL SUCCINATE 25 MG/1
25 TABLET, EXTENDED RELEASE ORAL 2 TIMES DAILY
Qty: 60 TABLET | Refills: 0 | Status: SHIPPED | OUTPATIENT
Start: 2024-02-21 | End: 2024-02-21

## 2024-02-21 RX ORDER — CETIRIZINE HYDROCHLORIDE 10 MG/1
10 TABLET ORAL DAILY
Qty: 30 TABLET | Refills: 0 | Status: SHIPPED | OUTPATIENT
Start: 2024-02-22 | End: 2024-02-21

## 2024-02-21 RX ORDER — ATORVASTATIN CALCIUM 10 MG/1
10 TABLET, FILM COATED ORAL EVERY EVENING
Qty: 30 TABLET | Refills: 0 | Status: SHIPPED | OUTPATIENT
Start: 2024-02-21 | End: 2024-04-24

## 2024-02-21 RX ORDER — DILTIAZEM HYDROCHLORIDE 240 MG/1
240 CAPSULE, COATED, EXTENDED RELEASE ORAL DAILY
Qty: 30 CAPSULE | Refills: 0 | Status: SHIPPED | OUTPATIENT
Start: 2024-02-22 | End: 2024-03-19

## 2024-02-21 RX ORDER — CETIRIZINE HYDROCHLORIDE 10 MG/1
10 TABLET ORAL DAILY
Qty: 30 TABLET | Refills: 0 | Status: SHIPPED | OUTPATIENT
Start: 2024-02-22

## 2024-02-21 RX ADMIN — METOPROLOL SUCCINATE 25 MG: 25 TABLET, EXTENDED RELEASE ORAL at 08:08

## 2024-02-21 RX ADMIN — DIGOXIN 125 MCG: 125 TABLET ORAL at 08:08

## 2024-02-21 RX ADMIN — CETIRIZINE HYDROCHLORIDE 10 MG: 10 TABLET, FILM COATED ORAL at 08:08

## 2024-02-21 RX ADMIN — APIXABAN 5 MG: 5 TABLET, FILM COATED ORAL at 08:08

## 2024-02-21 RX ADMIN — FUROSEMIDE 40 MG: 40 TABLET ORAL at 08:07

## 2024-02-21 RX ADMIN — DILTIAZEM HYDROCHLORIDE 240 MG: 240 CAPSULE, COATED, EXTENDED RELEASE ORAL at 08:07

## 2024-02-21 ASSESSMENT — ACTIVITIES OF DAILY LIVING (ADL)
ADLS_ACUITY_SCORE: 27

## 2024-02-21 NOTE — PROGRESS NOTES
Patient has been assessed for Home Oxygen needs. Oxygen readings:    *Pulse oximetry (SpO2) = 92% on room air at rest while awake.    *SpO2 improved to 92% on 0liters/minute at rest.    *SpO2 = 86% on room air during activity/with exercise.    *SpO2 improved to 92% on 3liters/minute during activity/with exercise.

## 2024-02-21 NOTE — DISCHARGE SUMMARY
"[facility]  Hospitalist Discharge Summary      Date of Admission:  2/14/2024  Date of Discharge:  2/21/2024  Discharging Provider: Chanelle Pacheco DO  Discharge Service: Hospitalist Service    Discharge Diagnoses   Acute hypoxic respiratory failure, multifactorial   Acute congestive heart failure/HFpEF, mild/moderate Aortic Stenosis, AFRVR, hypoventilation syndrome  New onset atrial fibrillation with RVR   Possible COPD   Likely seasonal/allergic rhinitis   Morbidly obese, hypoventilation syndrome, suspected sleep apnea   Hyperlipidemia   Prediabetes     Clinically Significant Risk Factors     # Severe Obesity: Estimated body mass index is 55.4 kg/m  as calculated from the following:    Height as of this encounter: 1.549 m (5' 1\").    Weight as of this encounter: 133 kg (293 lb 3.2 oz).       Follow-ups Needed After Discharge   Follow-up Appointments     Follow-up and recommended labs and tests       Follow up with primary care provider, [facility],   within 7 days for hospital follow- up and regarding new diagnosis.  The   following labs/tests are recommended: BMP.              Discharge Disposition   Discharged to home  Condition at discharge: Stable    Hospital Course   Shilpa Miramontes is a 53-year-old female with a medical history of non-rheumatic aortic valve stenosis, undiagnosed COPD, former smoker, pre-diabetes, hypertension, hyperlipidemia, morbid obesity, cholecystectomy, and RSV infection in November 2023 who presented to the [facility] emergency department for the evaluation of shortness of breath and she was found to be in a new onset of atrial fibrillation. ADAMARIS 2/20 with findings of left atrial appendage thrombus, plan for continued rate control with metoprolol, digoxin, diltiazem, with plan for ADAMARIS with cardioversion in 1 months time following anticoagulation.     Acute hypoxic respiratory failure, multifactorial   Acute congestive heart " failure/HFpEF, mild/moderate Aortic Stenosis, AFRVR, hypoventilation syndrome  Ongoing worsening SOB; issues seemed to begin in 11/2023 after RSV and subsequent repeat viral infections but suspect some in part exacerbations of likely heart failure. BNP elevated on admission with pulmonary edema; antibiotics and steroids held due to suspicion of heart failure exacerbation as well as uncontrolled atrial fibrillation, superimposed with probable mild/moderate aortic stenosis, as well as hypoventilation syndrome, dietary considerations. TTE with EF 55-60% with mild/moderate aortic stenosis, LAE.   -now on PO lasix, continue at discharge  -SOB much improved, but still requiring supplemental oxygen; ordered dme 02  -recommend outpatient sleep study     New onset atrial fibrillation with RVR  Unclear onset, has had symptoms of palpitations for a while so could be ongoing for some time. Have trialed numerous combinations of IV/PO AV batsheva blocking agents still with difficult to control RVR with intermittent bradycardia/pauses.  -ADAMARIS 2/20 with findings of DESIRE thrombus, planning for outpatient ADAMARIS/cardioversion in one month after continued anticoagulation.   -consolidated to toprol 25 mg bid and dilt xr 240 mg/day today, will assess response and tolerability, continue dig  - Has had pauses at night, likely also exacerbated by CATARINO. Monitor ordered    Possible COPD  Hx of tobacco use previously; ongoing wheezing could both be 2/2 CHF and pulm edema vs COPD, however she does think that the inhalers have helped.     Likely seasonal/allergic rhinitis  Headache/congestion in AM with symptoms ongoing for several months, will start cetirizine 5 mg daily (can increase to 10 mg), already on flonase, also recommend outpatient sleep study for CATARINO/overnight oxygenation evaluation.    Morbidly obese, hypoventilation syndrome, suspected sleep apnea  Patient with a BMI of 56.1 as of 2/18/24.  Some of that is due to fluid.  Given her shortness  of breath I suspect she has a component of hypoventilation syndrome.  Patient likely would benefit from an outpatient sleep study at some point.     Hyperlipidemia  Continue with PTA Atorvastatin     Prediabetes  Blood sugars have been 108-118.  Her hemoglobin A1c is 6.2.  At this point we will stop following Accu-Cheks           Hypokalemia/hypomagnesemia  With the patient's ongoing diuresis    Consultations This Hospital Stay   PHARMACY IP CONSULT  CARDIOLOGY IP CONSULT  [facility] IP CONSULT  PHARMACY DISCHARGE EDUCATION BY PHARMACIST    Code Status   Full Code    Time Spent on this Encounter   I, Chanelle Pacheco DO, personally saw the patient today and spent greater than 30 minutes discharging this patient.       Chanelle Pacheco DO  [place] [address] [facility]  [address] [place] [address]  Phone: [phone]  Fax: [phone]  ______________________________________________________________________    Physical Exam   Vital Signs: Temp: 98.3  F (36.8  C) Temp src: Oral BP: 102/82 Pulse: 78   Resp: 20 SpO2: 94 % O2 Device: None (Room air) Oxygen Delivery: 3 LPM  Weight: 293 lbs 3.2 oz         Primary Care Physician   [facility]    Discharge Orders      Basic metabolic panel     CBC with platelets     Follow-Up with Cardiology MAYA      Sleep Study Referral      Reason for your hospital stay    You were hospitalized for treatment of atrial fibrillation, an abnormal heart rhythm. We have started you on medications to help with this as well as medicine to help prevent blood clots, with plan to follow-up with cardiology for repeat ADAMARIS and cardioversion.  Recommend following up with your primary this week to discuss medication changes and check labs.  Referral sent for outpatient sleep study.     Follow-up and recommended labs and tests     Follow up with primary care provider, [facility], within 7 days for hospital follow- up and  regarding new diagnosis.  The following labs/tests are recommended: BMP.     Activity    Your activity upon discharge: activity as tolerated     Oxygen Order    Oxygen Documentation  I certify that this patient, Shilpa Miramontes has been under my care (or a nurse practitioner or physician's assistant working with me). This is the face-to-face encounter for oxygen medical necessity.      At the time of this encounter, I have reviewed the qualifying testing and have determined that supplemental oxygen is reasonable and necessary and is expected to improve the patient's condition in a home setting.       Patient has continued oxygen desaturation due to Acute Respiratory Failure J96.01  Chronic Respiratory Failure with Hypoxia J96.11.    If portability is ordered, is the patient mobile within the home? yes     Diet    Follow this diet upon discharge: Orders Placed This Encounter      Low Saturated Fat Na <2400 mg       Significant Results and Procedures   Most Recent 3 CBC's:  Recent Labs   Lab Test 02/20/24  0543 02/17/24  0611 02/15/24  0807 02/15/24  0636 02/14/24  1619   WBC  --   --  9.1 9.1 9.0   HGB  --   --  12.1 11.8 13.0   MCV  --   --  96 97 94    238 254 265 279     Most Recent 3 BMP's:  Recent Labs   Lab Test 02/21/24  0647 02/20/24  0543 02/20/24  0244 02/19/24  0611    139  --  140   POTASSIUM 4.1 4.3  --  4.1   CHLORIDE 102 101  --  100   CO2 29 32*  --  31*   BUN 14.3 13.6  --  19.8   CR 0.80 0.76  --  0.82   ANIONGAP 10 6*  --  9   KRISTA 8.6 8.5*  --  8.4*   * 122* 128* 123*     Most Recent 3 BNP's:  Recent Labs   Lab Test 02/15/24  0636 02/14/24  1619   NTBNPI 2,007* 1,647*     Most Recent Cholesterol Panel:  Recent Labs   Lab Test 02/15/24  0636   CHOL 145   LDL 87   HDL 36*   TRIG 108   ,   Results for orders placed or performed during the hospital encounter of 02/14/24   XR Chest Port 1 View    Narrative    EXAM: XR CHEST PORT 1 VIEW  LOCATION: [facility]  HOSPITAL  DATE: 2024    INDICATION: Shortness of breath  COMPARISON: 2023.      Impression    IMPRESSION: Patchy interstitial opacities may represent pulmonary edema and/or sequelae of infection. No significant effusions or pneumothorax. Cardiomegaly. No acute osseous findings.   Echocardiogram Complete     Value    LVEF  55-60%    [facility]    [id number]  [id number]  [id number]  [id number]^FELIX^MARISSA^RICARDO     [facility]  Echocardiography Laboratory  [address] [place] [address]     Name: CANDI PATEL  MRN: [id number]  : 1970  Study Date: 02/15/2024 10:42 AM  Age: 53 yrs  Gender: Female  Patient Location: [facility]  Reason For Study: Atrial Fibrillation  Ordering Physician: MARISSA WASHINGTON  Performed By: Kim Corley     BSA: 2.3 m2  Height: 61 in  Weight: 309 lb  BP: 101/80 mmHg  ______________________________________________________________________________  Procedure  Complete Portable Echo Adult. Optison (NDC #[id number]) given intravenously.  ______________________________________________________________________________  Interpretation Summary     There is mild (1+) aortic regurgitation.  Mild valvular aortic stenosis.  The mean AoV pressure gradient is 17mmHg.  There is mild concentric left ventricular hypertrophy.  The visual ejection fraction is 55-60%.  The left atrium is mildly dilated.  There is mild (1+) mitral regurgitation.  The rhythm was atrial fibrillation.  The study was technically difficult. Contrast was used without apparent  complications.  ______________________________________________________________________________  Left Ventricle  The left ventricle is normal in size. There is mild concentric left  ventricular hypertrophy. The visual ejection fraction is 55-60%. Left  ventricular diastolic function is abnormal.     Right Ventricle  The right ventricle is normal in structure, function and size.     Atria  The left atrium is mildly  dilated. Right atrial size is normal.     Mitral Valve  There is mild to moderate mitral annular calcification. The mitral valve  leaflets are mildly thickened. There is mild (1+) mitral regurgitation.     Tricuspid Valve  The tricuspid valve is not well visualized, but is grossly normal.     Aortic Valve  The aortic valve is not well visualized. There is mild (1+) aortic  regurgitation. Mild valvular aortic stenosis. The mean AoV pressure gradient  is 17mmHg.     Pulmonic Valve  The pulmonic valve is not well seen, but is grossly normal.     Vessels  The aortic root is normal size. Dilation of the inferior vena cava is present  with normal respiratory variation in diameter.     Pericardium  There is no pericardial effusion.     Rhythm  The rhythm was atrial fibrillation.  ______________________________________________________________________________  MMode/2D Measurements & Calculations  IVSd: 1.2 cm  LVIDd: 5.2 cm  LVIDs: 3.6 cm  LVPWd: 1.2 cm  FS: 32.1 %  LV mass(C)d: 248.9 grams  LV mass(C)dI: 109.5 grams/m2  Ao root diam: 2.5 cm  asc Aorta Diam: 3.5 cm  LVOT diam: 2.0 cm  LVOT area: 3.1 cm2  Ao root diam index Ht(cm/m): 1.6  Ao root diam index BSA (cm/m2): 1.1  Asc Ao diam index BSA (cm/m2): 1.5  Asc Ao diam index Ht(cm/m): 2.2  LA Volume (BP): 70.4 ml     LA Volume Index (BP): 31.0 ml/m2  RWT: 0.45  TAPSE: 1.7 cm     Doppler Measurements & Calculations  MV max P.0 mmHg  MV mean PG: 10.1 mmHg  MV V2 VTI: 58.3 cm  MVA(VTI): 1.4 cm2  MV P1/2t max deni: 235.3 cm/sec  MV P1/2t: 111.5 msec  MVA(P1/2t): 2.0 cm2  MV dec slope: 618.0 cm/sec2  Ao V2 max: 271.6 cm/sec  Ao max P.0 mmHg  Ao V2 mean: 207.5 cm/sec  Ao mean P.8 mmHg  Ao V2 VTI: 63.4 cm  MINI(I,D): 1.3 cm2  MINI(V,D): 1.4 cm2  AI P1/2t: 400.3 msec  LV V1 max P.0 mmHg  LV V1 max: 120.2 cm/sec  LV V1 VTI: 27.4 cm  MR PISA: 1.8 cm2  MR ERO: 0.11 cm2  MR volume: 13.6 ml  SV(LVOT): 84.5 ml  SI(LVOT): 37.2 ml/m2  TR max deni: 229.9 cm/sec  TR max PG:  21.1 mmHg     AV Kunal Ratio (DI): 0.44  MINI Index (cm2/m2): 0.59     ______________________________________________________________________________  Report approved by: Tanvir Nichols 02/15/2024 01:31 PM         Echocardiogram ADAMARIS     Value    LVEF  55-60%    Narrative    [id number]  [id number]  [id number]  [id number]^BENITA^DAYANNA^VANCE     [facility]  Echocardiography Laboratory  [address] [place] [address]     Name: CANDI PATEL  MRN: [id number]  : 1970  Study Date: 2024 10:51 AM  Age: 53 yrs  Gender: Female  Patient Location: [facility]  Reason For Study: Atrial Fibrillation  Ordering Physician: DAYANNA JOY  Performed By: Kim Corley     BSA: 1.9 m2  Height: 61 in  Weight: 193 lb  HR: 110  BP: 110/62 mmHg  ______________________________________________________________________________  Procedure  Complete ADAMARIS Adult. ADAMARIS Probe serial #[id number] (R) was used during the  procedure. The heart rate, respiratory rate and response to care were  monitored throughout the procedure with the assistance of the nurse.  ______________________________________________________________________________  Interpretation Summary     Left ventricular systolic function is normal.The visual ejection fraction is  55-60%.  The right ventricular systolic function is normal.  There is a thrombus seen in the left atrial appendage (best seen in image #  48).  Spontaneous contrast in left atrial appendage.  Low emptying velocities in left atrial appendage.  There is mild to moderate (1-2+) mitral regurgitation.There is mild (1+)  aortic regurgitation.     Discussed with Dr Ed Quach.  ______________________________________________________________________________  ADAMARIS  Versed (1mg) was given intravenously. Fentanyl (50mcg) was given  intravenously. I determined this patient to be an appropriate candidate for  the planned sedation and procedure and have reassessed the patient  immediately  prior to sedation and procedure. Total sedation time: 6min minutes of  continuous bedside 1:1 monitoring. Consent to the procedure was obtained prior  to sedation. Prior to the exam, the oral cavity was checked and no  overcrowding was noted. The transesophageal probe was passed without  difficulty. There were no complications associated with this procedure.     Left Ventricle  Left ventricular systolic function is normal. The visual ejection fraction is  55-60%.     Right Ventricle  The right ventricular systolic function is normal.     Atria  The left atrium is moderately dilated. Right atrial size is normal. There is  no color Doppler evidence of an atrial shunt. There is a thrombus seen in the  left atrial appendage. Spontaneous contrast in left atrial appendage. low  emptying velocities in left atrial appendage.     Mitral Valve  There is mild to moderate (1-2+) mitral regurgitation.     Tricuspid Valve  There is mild (1+) tricuspid regurgitation.     Aortic Valve  There is mild (1+) aortic regurgitation.     Vessels  decreased systolic velocities pulmonary vein doppler.     Pericardial/Pleural  There is no pericardial effusion.     ______________________________________________________________________________  Report approved by: Tanvir Duong 02/20/2024 12:46 PM     ______________________________________________________________________________          Discharge Medications   Current Discharge Medication List        START taking these medications    Details   apixaban ANTICOAGULANT (ELIQUIS) 5 MG tablet Take 1 tablet (5 mg) by mouth 2 times daily  Qty: 60 tablet, Refills: 0    Associated Diagnoses: Atrial fibrillation with RVR (H); Thrombus of left atrial appendage; CATARINO (obstructive sleep apnea)      atorvastatin (LIPITOR) 10 MG tablet Take 1 tablet (10 mg) by mouth every evening  Qty: 30 tablet, Refills: 0    Associated Diagnoses: Atrial fibrillation with RVR (H); Thrombus of left atrial  appendage; CATARINO (obstructive sleep apnea)      cetirizine (ZYRTEC) 10 MG tablet Take 1 tablet (10 mg) by mouth daily  Qty: 30 tablet, Refills: 0    Associated Diagnoses: Atrial fibrillation with RVR (H); Thrombus of left atrial appendage; CATARINO (obstructive sleep apnea)      digoxin (LANOXIN) 125 MCG tablet Take 1 tablet (125 mcg) by mouth daily  Qty: 30 tablet, Refills: 0    Associated Diagnoses: Atrial fibrillation with RVR (H); Thrombus of left atrial appendage; CATARINO (obstructive sleep apnea)      diltiazem ER COATED BEADS (CARDIZEM CD/CARTIA XT) 240 MG 24 hr capsule Take 1 capsule (240 mg) by mouth daily  Qty: 30 capsule, Refills: 0    Associated Diagnoses: Atrial fibrillation with RVR (H); Thrombus of left atrial appendage; CATARINO (obstructive sleep apnea)      furosemide (LASIX) 40 MG tablet Take 1 tablet (40 mg) by mouth daily  Qty: 30 tablet, Refills: 0    Associated Diagnoses: Atrial fibrillation with RVR (H); Thrombus of left atrial appendage; CATARINO (obstructive sleep apnea)      metoprolol succinate ER (TOPROL XL) 25 MG 24 hr tablet Take 1 tablet (25 mg) by mouth 2 times daily  Qty: 60 tablet, Refills: 0    Associated Diagnoses: Atrial fibrillation with RVR (H); Thrombus of left atrial appendage; CATARINO (obstructive sleep apnea)           CONTINUE these medications which have NOT CHANGED    Details   albuterol (VENTOLIN HFA) 108 (90 Base) MCG/ACT inhaler Inhale 2 puffs into the lungs every 6 hours as needed for shortness of breath, wheezing or cough  Qty: 18 g, Refills: 0    Comments: Pharmacy may dispense brand covered by insurance (Proair, or proventil or ventolin or generic albuterol inhaler)      fluticasone (FLONASE) 50 MCG/ACT nasal spray Spray 1 spray into both nostrils daily as needed for rhinitis or allergies           STOP taking these medications       amLODIPine (NORVASC) 5 MG tablet Comments:   Reason for Stopping:         lisinopril (ZESTRIL) 10 MG tablet Comments:   Reason for Stopping:              Allergies   Allergies   Allergen Reactions    Morphine      rash    Fluoxetine Rash    Percocet [Oxycodone-Acetaminophen] Rash

## 2024-02-21 NOTE — PLAN OF CARE
Goal Outcome Evaluation:      Plan of Care Reviewed With: patient    Overall Patient Progress: improvingOverall Patient Progress: improving       Care from [id number]    Admit Date: 2/14/2024  Admitting Diagnosis: Dyspnea, Afib RVR, CHF  Pertinent History: Undiagnosed COPD, former smoker, Pre-DM, HTN, HLD    Neuro: Alert and Oriented x4  Activity: are SBA with no assistive devices   Telemetry Monitoring: Yes,   Pain: denying pain.  Labs / Tests: Mag and K protocol. No replacement given.   GI: bowel sounds audible. Denies nausea.   : voiding spontaneously ambulating to bathroom  O2: RA. 3 LPM oxymask over night  LDA's: Peripheral  Fluids: is Saline locked.  Diet: low saturated fat   Consults: Cardiology  Discharge Disposition: D

## 2024-02-21 NOTE — PROGRESS NOTES
Cardiology Progress Note          Assessment and Plan:     Admit 2/14/24 with progressive SOB  Evidence of rapid A-fib, acute HFpEF exacerbation.    PMH:  aortic stenosis, former smoker, COPD, pre-diabetes, hypertension, hyperlipidemia, morbid obesity, likely untreated sleep apnea        NEW Paroxysmal Atrial Fibrillation with RVR, Asymptomatic  -ongoing A-fib since admission  -Attempted ADAMARIS DCCV 2/20/24 but had DESIRE thrombus, so could not cardiovert  -Rate control Toprol XL 25 mg BID, dilt  mg, dig 125 mcg. Rates are decently controlled on this regimen. No sig pauses last night.   Will send home with a cardiac event monitor.   -Eliquis started  (CHADS2 Vasc score: 4)  -Repeat ADAMARIS in 1 month       Acute HFpEF exacerbation  NT proBNP: 2000 / pulmonary edema on CXR  -LVEF 55-60%, normal RV function, mild LAE, 1+ MR  -improved symptoms with IV diuresis  -Now on PO Lasix and appears close to euvolemic      Mild-Moderate Aortic Stenosis  -mean gradient 17 mmHg      Suspected  Sleep Apnea  -wearing CPAP here  -outpatient sleep evaluation recommended      Morbid Obesity      NSVT  -12 beat run NSVT overnight, asx  -Trops on admission WNL  -EF normal  -K and Mg WNL  -Continue BB and dilt  -Outpatient Emily        Ok to discharge today.     Follow up:  Home with event monitor.   Cardiology in 2 weeks with BMP and CBC, she would like to follow with [facility] cardiology.  (Orders placed and message sent to scheduling). Arrange for repeat ADAMARIS DCCV and Emily at that time. Also refer to sleep medicine.        Irving Mahan PA-C                      Interval History:   Breathing is improved  She does sleep better with CPAP on  No sxs last night during NSVT  No bleeding concerns             Medications:      apixaban ANTICOAGULANT  5 mg Oral BID    atorvastatin  10 mg Oral QPM    cetirizine  10 mg Oral Daily    digoxin  125 mcg Oral Daily    diltiazem ER COATED BEADS  240 mg Oral Daily    furosemide  40 mg Oral Daily  "   metoprolol succinate ER  25 mg Oral BID BMT CSA    sodium chloride (PF)  3 mL Intracatheter Q8H            Physical Exam:   Blood pressure 119/80, pulse 101, temperature 98.3  F (36.8  C), temperature source Oral, resp. rate 20, height 1.549 m (5' 1\"), weight 133 kg (293 lb 3.2 oz), last menstrual period 12/14/2020, SpO2 94%.  Wt Readings from Last 3 Encounters:   02/20/24 133 kg (293 lb 3.2 oz)   11/27/23 135.2 kg (298 lb)   01/21/21 127 kg (280 lb)     I/O last 3 completed shifts:  In: 660 [P.O.:660]  Out: 1575 [Urine:1575]    CONST:  alert and oriented    LUNGS: CTA  No wheezing  CARDIO:  Irreg, Irreg  distant heart tones  ABD:  obese  EXT:  trace ankle edema           Data:   TELE:  A-fib, rates controlled      CBC  Recent Labs   Lab 02/20/24  0543 02/17/24  0611 02/15/24  0807 02/15/24  0636   WBC  --   --  9.1 9.1   HGB  --   --  12.1 11.8    238 254 265       BMP  Recent Labs   Lab 02/21/24  0647 02/20/24  0543 02/20/24  0244 02/19/24  0611 02/18/24  0632    139  --  140 142   POTASSIUM 4.1 4.3  --  4.1 4.1   CHLORIDE 102 101  --  100 101   KRISTA 8.6 8.5*  --  8.4* 8.5*   CO2 29 32*  --  31* 33*   BUN 14.3 13.6  --  19.8 28.7*   CR 0.80 0.76  --  0.82 1.01*   * 122* 128* 123* 108*     Recent Labs   Lab Test 02/15/24  0636   CHOL 145   HDL 36*   LDL 87   TRIG 108       TROP  No results found for: \"TROPI\", \"TROPONIN\", \"TROPR\"    BNP  Recent Labs   Lab 02/15/24  0636   NTBNPI 2,007*            "

## 2024-02-21 NOTE — PROVIDER NOTIFICATION
Pt had a 12 beat run of V tach at 1830. Vitals are normal and patient has no complaints. Please notify RN if any furhter orders needed.  Per Dr Kelley, no new orders and continue to monitor on tele.

## 2024-02-21 NOTE — PLAN OF CARE
"  A&O x4  Cardiac monitor placed on pt  Home O2 delivered to pt  Pt needing O2 with activity and while sleeping  Denies pain, SoB, N/V  Up SBA  Completed discharge instructions and pt education pertaining to sleep apnea and Afib.  New medications received by pt.  Pt discharged with  at 1500      /82 (BP Location: Left arm)   Pulse 78   Temp 98.3  F (36.8  C) (Oral)   Resp 20   Ht 1.549 m (5' 1\")   Wt 133 kg (293 lb 3.2 oz)   LMP 12/14/2020   SpO2 94%   BMI 55.40 kg/m      "

## 2024-02-21 NOTE — PROGRESS NOTES
Oxygen Documentation  I certify that this patient, Shilpa Miramontes has been under my care (or a nurse practitioner or physician's assistant working with me). This is the face-to-face encounter for oxygen medical necessity.      At the time of this encounter, I have reviewed the qualifying testing and have determined that supplemental oxygen is reasonable and necessary and is expected to improve the patient's condition in a home setting.       Patient has continued oxygen desaturation due to Acute Respiratory Failure J96.01  Chronic Respiratory Failure with Hypoxia J96.11.    If portability is ordered, is the patient mobile within the home? Yes    Chanelle Pacheco, DO

## 2024-02-22 ENCOUNTER — PATIENT OUTREACH (OUTPATIENT)
Dept: CARE COORDINATION | Facility: CLINIC | Age: 54
End: 2024-02-22
Payer: COMMERCIAL

## 2024-02-22 ENCOUNTER — TELEPHONE (OUTPATIENT)
Dept: CARDIOLOGY | Facility: CLINIC | Age: 54
End: 2024-02-22
Payer: COMMERCIAL

## 2024-02-22 NOTE — TELEPHONE ENCOUNTER
Patient was admitted to [facility] on 2/14/24 with progressive SOB. Evidence of new onset rapid A-fib, acute HFpEF exacerbation.     PMH:  aortic stenosis, former smoker, COPD, pre-diabetes, hypertension, hyperlipidemia, morbid obesity, likely untreated sleep apnea    2/15/24: Echo showed EF of 55-60%, normal RV function, mild LAE, 1+ MR.    2/20/24: ADAMARIS with planned DCCV, but had DESIRE thrombus, so could not cardiovert. Plan for ADAMARIS with cardioversion in 1 months time following anticoagulation.     IV Lasix diuresed.    Pt was started on Eliquis, Lipitor, Zyrtec, Digoxin, Lasix, Metoprolol and Diltiazem. PTA Norvasc and Lisinopril were discontinued at time of discharge. 30 day heart monitor placed at time of discharge.    Pt needs to be scheduled for NM Lexiscan, ADAMARIS/DCCV, cardiology MAYA and cardiologist appt's as ordered.    Writer attempted to call pt for a cardiology post discharge phone call, but no answer. VM left to return my call. Reminder left of need to schedule appts as above. Scheduling and Writer's phone numbers provided. DAMIAN Lam RN.

## 2024-02-22 NOTE — PROGRESS NOTES
Connected Care Resource Center: [facility]    Background: Transitional Care Management program identified per system criteria and reviewed by [facility] [place] team for possible outreach.    Assessment: Upon chart review, CCR Team member will not proceed with patient outreach related to this episode of Transitional Care Management program due to reason below:    Patient has active communication with a nurse, provider or care team for reason of post-hospital follow up plan.  Outreach call by CCRC team not indicated to minimize duplicative efforts.     See Cardiology TE from 2/22/24 for post hospital discharge phone call.    Plan: Transitional Care Management episode addressed appropriately per reason noted above.      Radha Moreno RN  Connected Care Resource Center, [facility]    *Connected Care Resource Team does NOT follow patient ongoing. Referrals are identified based on internal discharge reports and the outreach is to ensure patient has an understanding of their discharge instructions.

## 2024-02-23 NOTE — TELEPHONE ENCOUNTER
"Returned pt's phone message.    Called patient to discuss any post hospital d/c questions, review discharge medication, and confirm f/u appts. Patient denied any questions regarding new or changes to PTA medications.     Patient denied any SOB, chest pain, edema, or light headedness. Pt states she has had a couple short episodes of fluttering episodes and few seconds of \"pain at the bottom of my heart.\" This is the best I have felt in a long time.    RN confirmed with patient that she needs to be scheduled for NM Lexiscan, ADAMARIS/DCCV, cardiology MAYA and cardiologist appt's as ordered. Scheduling and Dr. Wick's phone numbers provided. States she has already been in contact with scheduling and appt's are being arranged.    Instructed patient to weigh self every AM, after waking and using the restroom, but before breakfast and medications. Call clinic for a weight gain of 2 lbs overnight or 5 lbs in a week. Low Na+ diet encouraged. Pt instructed to bring daily wt/BP diary and medications with to f/u OV.     Patient advised to call clinic with any cardiac related questions or concerns prior to his appt. Patient verbalized understanding and agreed with plan. DAMIAN Lam RN.     "

## 2024-02-26 ENCOUNTER — TELEPHONE (OUTPATIENT)
Dept: CARDIOLOGY | Facility: CLINIC | Age: 54
End: 2024-02-26
Payer: COMMERCIAL

## 2024-02-26 NOTE — TELEPHONE ENCOUNTER
Pt returned call stating she was sleeping at the time of this episode so is not aware of any symptoms.  She did receive a call from [employer] advising her to go the ED for evaluation but she had transportation issues and was unable to get there.  Pt states that over the last day she has noticed short 3-4 sec episodes of chest pain but otherwise denies symptoms.  Advised pt she needs to be evaluated for the pause seen on Urgent Report.  She has scheduled appt tomorrow morning with PCP and states she will go to the ER with worsening symptoms.

## 2024-02-26 NOTE — TELEPHONE ENCOUNTER
Received urgent Biotel Alert dated 2/25/24 at 0744 showing atrial fib with PVC and multiple pauses, longest being 7.6 seconds.  Per report, notification was in progress.  No notes in Epic.  Called pt to review, no answer.  LM requesting call back.

## 2024-02-27 ENCOUNTER — TELEPHONE (OUTPATIENT)
Dept: CARDIOLOGY | Facility: CLINIC | Age: 54
End: 2024-02-27
Payer: COMMERCIAL

## 2024-02-27 NOTE — TELEPHONE ENCOUNTER
Writer received urgent report from team rafa Thomas, from [employer] heart rhythm monitor regarding a 7.3 second pause this morning.          Writer placed call to Shilpa.  I had to LVM, inquired if she was asleep or if she remembered having any symptoms.    Writer discussed with Dr. Wick, who reviewed the printout of the rhythm.  He states that patient was likely asleep, and we will continue to monitor.    Await call back from patient.    Mamie Lopez RN on 2/27/2024 at 11:05 AM

## 2024-02-27 NOTE — TELEPHONE ENCOUNTER
"Patient did return call.  States she was awake around 2:50 am, but definitely not at 3:10 am.   She does not recall feeling any symptoms at all, just sleeping.    Writer thanked her for the information.   Dr. Wick would like her to just keep using the monitor and if she does have any symptoms to be sure and push the \"event button\".    Mamie Lopez RN on 2/27/2024 at 4:01 PM    "

## 2024-02-27 NOTE — TELEPHONE ENCOUNTER
Received call from Abena at [facility] reporting an auto trigger event at 0330 AM on 2/27, afib 30-80 bpm, with a 7.3 second pause. I was told the report is available online. Will route an update to 's nurse team as he is the ordering cardiologist. Ana OSBORN February 27, 2024, 10:13 AM

## 2024-02-28 ENCOUNTER — TELEPHONE (OUTPATIENT)
Dept: CARDIOLOGY | Facility: CLINIC | Age: 54
End: 2024-02-28
Payer: COMMERCIAL

## 2024-02-28 NOTE — TELEPHONE ENCOUNTER
Message received from [place] team nurse today:    Received an Urgent Bio Tel phone call reporting a 6.8 second pause auto-trigger at 6:20 am.  Underlying Rhythm is AF.  Will forward to Dr. Wick for review. Estelle Sun RN on 2/28/2024 at 8:33 AM    Call placed to patient to find out if she was awake and\or symptomatic at that time.

## 2024-02-28 NOTE — TELEPHONE ENCOUNTER
Received an Urgent Bio Tel phone call reporting a 6.8 second pause auto-trigger at 6:20 am.  Underlying Rhythm is AF.  Will forward to Dr. Wick for review. Estelle Sun RN on 2/28/2024 at 8:33 AM

## 2024-03-06 ENCOUNTER — TELEPHONE (OUTPATIENT)
Dept: CARDIOLOGY | Facility: CLINIC | Age: 54
End: 2024-03-06
Payer: COMMERCIAL

## 2024-03-06 NOTE — TELEPHONE ENCOUNTER
[facility] Call Center    Phone Message    May a detailed message be left on voicemail: yes     Reason for Call: Other: Patient called and needs to get schedule for series of appt. Please reach out to patient to schedule. Silvia is aware of appts. Thank you      Action Taken: Other: cardiology     Travel Screening: Not Applicable    Thank you!  Specialty Access Center

## 2024-03-06 NOTE — TELEPHONE ENCOUNTER
Hi Team 5 and Anah team,    There is a lot of orders in this pt chart in what order is the pt doing appts?    -There is no avail with [facility] in [place] until April might be able to offer slightly sooner in [place] if pt willing to travel.  -Does pt need H&P for the ADAMARIS&DCCV?   -is Nuc Emily to be done before or after procedure (wont be able to be done on the same day as procedure as there is not enough time to do both)    Ty-Sasi

## 2024-03-07 ENCOUNTER — TELEPHONE (OUTPATIENT)
Dept: CARDIOLOGY | Facility: CLINIC | Age: 54
End: 2024-03-07

## 2024-03-07 ENCOUNTER — OFFICE VISIT (OUTPATIENT)
Dept: CARDIOLOGY | Facility: CLINIC | Age: 54
End: 2024-03-07
Payer: COMMERCIAL

## 2024-03-07 VITALS
BODY MASS INDEX: 55.32 KG/M2 | WEIGHT: 293 LBS | HEART RATE: 90 BPM | OXYGEN SATURATION: 95 % | DIASTOLIC BLOOD PRESSURE: 56 MMHG | HEIGHT: 61 IN | SYSTOLIC BLOOD PRESSURE: 92 MMHG

## 2024-03-07 DIAGNOSIS — I48.19 PERSISTENT ATRIAL FIBRILLATION (H): Primary | ICD-10-CM

## 2024-03-07 DIAGNOSIS — I47.29 NSVT (NONSUSTAINED VENTRICULAR TACHYCARDIA) (H): ICD-10-CM

## 2024-03-07 PROCEDURE — 93000 ELECTROCARDIOGRAM COMPLETE: CPT | Performed by: INTERNAL MEDICINE

## 2024-03-07 PROCEDURE — 99204 OFFICE O/P NEW MOD 45 MIN: CPT | Performed by: INTERNAL MEDICINE

## 2024-03-07 NOTE — TELEPHONE ENCOUNTER
3\7  Writer was sent urgent notification of another 9+ second pause this morning.  Writer was finally able to reach Shilpa by phone.    States she was asleep at the time of this pause.    Shilpa confirmed that she WILL be able to get to the 1:15 appmnt today, writer called and spoke with  to get the HOLD changed to a confirmed apmnt for Dr. Stevens.

## 2024-03-07 NOTE — PROGRESS NOTES
PHYSICIAN NOTE:  This visit was completed in person at the [facility].      I had the pleasure of seeing Ms. Shilpa Miramontes for evaluation of AF with episodic bradycardia. She has been referred to EP, somewhat urgently, by Dr. Claude Wick.    Very pleasant 53-year-old female with hypertension, severe obesity, prediabetes, mild aortic stenosis/regurgitation, COPD RSV infection (11/2023). She does she was seen by cardiology at [facility] in 11/2023 and was in normal rhythm of the time; an outpatient stress test was recommended. She then presented to the [facility] ER on 2/14/2024 with severe dyspnea which had developed in the preceding weeks. She was in AF with RVR in the 150s-170s. She was also diagnosed with diastolic CHF.    Ms Miramontes was placed on rate controlling medications and was diuresed. ADAMARIS-guided cardioversion was recommended. However, DESIRE thrombus was detected by ADAMARIS. Cardioversion was aborted and the patient was discharged on apixaban, digoxin 0.125 mg, diltiazem  mg, metoprolol XL 25 mg BID. While hospitalized, nocturnal pauses were seen on telemetry. Few asymptomatic runs of NSVT were also noted. The patient has been scheduled for outpatient nuclear stress test. She was also scheduled for sleep evaluation, currently scheduled in May 2024.    So far her cardiac monitor has shown episodes of bradycardia, occasionally severe with pauses up to 10 seconds in the early a.m. Today, the patient denies syncope or near syncope. She has had a couple of minor lightheaded spells.    The patient feels significantly improved compared to the days preceding her hospitalization, though not back to normal. She is physically inactive. No orthopnea or PND.    She lives in [place] with her .  No ETOH since 11/2023. Used to smoke, though not since February..      PHYSICAL EXAMINATION:  Vital signs: 92/56, 90 and irregular, 136.6 kg, height 155 cm, BMI 57.  General: very  pleasant woman, severely overweight, in no apparent distress  ENT/Mouth:  no nasal discharge.  Eyes:  normal conjunctivae.   Neck:  no thyromegaly or lymphadenopathy.  Chest/Lungs:  patient is not dyspneic.  Lungs CTA, without rales or wheezing  Cardiovascular: normal JVP, rhythm is irregular.  No gallop, 1/6 systolic murmur, no rub.    Abdomen: severe abdominal obesity  Extremities: trace LE edema  Skin:  no xanthelasma.    Neurologic:  alert & oriented x 3.  No tremor.    Vascular:  2+ carotids without bruits.  2+ radials.        DIAGNOSTIC STUDIES:  Laboratory studies: sodium 141, potassium 4.1, creatinine 0.8, hematocrit 39.8%, platelets 254K  ECG (02/2024): AF with RVR  Echocardiogram (02/2024): LVEF 55 - 60%, mild LVH, mild aortic stenosis and regurgitation      IMPRESSION:  Persistent atrial fibrillation with RVR. DESIRE thrombus. On appropriate dose of apixaban. Current rate control is too tight.  We need to decrease rate control medications. Severe nocturnal bradycardia is almost certainly related to severe CATARINO. Of note, she has not had syncope or near syncope. There is no immediate need for pacemaker implantation.  Chronic diastolic heart failure.  Asymptomatic NSVT.  Morbid obesity.  COPD.  Mild aortic valve disease.    RECOMMENDATIONS:  Stop digoxin.  Decrease Toprol-XL from 25 mg BID to 25 mg daily.  Continue other cardiac medications including apixaban, diltiazem, furosemide.  The patient is on a waiting list for sleep evaluation at [facility], to be done in May at the latest.  See [facility] in [place] toward the end of March and discuss ADAMARIS-guided cardioversion. High probability of AF recurrence post cardioversion, but reasonable to perform this without AA drug on board since this is her first AF event.    It was my pleasure seeing this delightful patient.  Please feel free to call with any questions.     William Stevens MD, FACC      (Chart documentation was completed, in part, using Dragon  voice-recognition software. The note was reviewed, however grammatical and spelling errors may be present.)      CURRENT MEDICATIONS:  Current Outpatient Medications   Medication Sig Dispense Refill    albuterol (VENTOLIN HFA) 108 (90 Base) MCG/ACT inhaler Inhale 2 puffs into the lungs every 6 hours as needed for shortness of breath, wheezing or cough 18 g 0    apixaban ANTICOAGULANT (ELIQUIS) 5 MG tablet Take 1 tablet (5 mg) by mouth 2 times daily 60 tablet 0    atorvastatin (LIPITOR) 10 MG tablet Take 1 tablet (10 mg) by mouth every evening 30 tablet 0    cetirizine (ZYRTEC) 10 MG tablet Take 1 tablet (10 mg) by mouth daily 30 tablet 0    digoxin (LANOXIN) 125 MCG tablet Take 1 tablet (125 mcg) by mouth daily 30 tablet 0    diltiazem ER COATED BEADS (CARDIZEM CD/CARTIA XT) 240 MG 24 hr capsule Take 1 capsule (240 mg) by mouth daily 30 capsule 0    fluticasone (FLONASE) 50 MCG/ACT nasal spray Spray 1 spray into both nostrils daily as needed for rhinitis or allergies      furosemide (LASIX) 40 MG tablet Take 1 tablet (40 mg) by mouth daily 30 tablet 0    metoprolol succinate ER (TOPROL XL) 25 MG 24 hr tablet Take 1 tablet (25 mg) by mouth 2 times daily 60 tablet 0       ALLERGIES     Allergies   Allergen Reactions    Morphine      rash    Fluoxetine Rash    Percocet [Oxycodone-Acetaminophen] Rash       PAST MEDICAL HISTORY:  No past medical history on file.    PAST SURGICAL HISTORY:  Past Surgical History:   Procedure Laterality Date    CHOLECYSTECTOMY  2004       FAMILY HISTORY:  Family History   Problem Relation Age of Onset    Family History Negative Mother     Family History Negative Father     Prostate Cancer Paternal Grandfather 88       SOCIAL HISTORY:  Social History     Socioeconomic History    Marital status: Single   Tobacco Use    Smoking status: Every Day     Packs/day: .5     Types: Cigarettes    Smokeless tobacco: Never    Tobacco comments:     1 pack a week   Substance and Sexual Activity    Alcohol  use: Yes     Comment: occAS    Drug use: No    Sexual activity: Yes     Partners: Male   Other Topics Concern    Parent/sibling w/ CABG, MI or angioplasty before 65F 55M? No       Review of Systems:  Skin:          Eyes:         ENT:         Respiratory:          Cardiovascular:         Gastroenterology:        Genitourinary:         Musculoskeletal:         Neurologic:         Psychiatric:         Heme/Lymph/Imm:         Endocrine:           Physical Exam:  Vitals: LMP 12/14/2020     Constitutional:           Skin:             Head:           Eyes:           Lymph:      ENT:           Neck:           Respiratory:            Cardiac:                                                           GI:           Extremities and Muscular Skeletal:                 Neurological:           Psych:           CC  No referring provider defined for this encounter.

## 2024-03-07 NOTE — LETTER
3/7/2024    [facility]  [address] [address] [address]    RE: Shilpa Miramontes       Dear Colleague,     I had the pleasure of seeing Shilpa Miramontes in the [facility].  PHYSICIAN NOTE:  This visit was completed in person at the [facility].      I had the pleasure of seeing Ms. Shilpa Miramontes for evaluation of AF with episodic bradycardia. She has been referred to EP, somewhat urgently, by Dr. Claude Wick.    Very pleasant 53-year-old female with hypertension, severe obesity, prediabetes, mild aortic stenosis/regurgitation, COPD RSV infection (11/2023). She does she was seen by cardiology at [facility] in 11/2023 and was in normal rhythm of the time; an outpatient stress test was recommended. She then presented to the [facility] ER on 2/14/2024 with severe dyspnea which had developed in the preceding weeks. She was in AF with RVR in the 150s-170s. She was also diagnosed with diastolic CHF.    Ms Miramontes was placed on rate controlling medications and was diuresed. ADAMARIS-guided cardioversion was recommended. However, DESIRE thrombus was detected by ADAMARIS. Cardioversion was aborted and the patient was discharged on apixaban, digoxin 0.125 mg, diltiazem  mg, metoprolol XL 25 mg BID. While hospitalized, nocturnal pauses were seen on telemetry. Few asymptomatic runs of NSVT were also noted. The patient has been scheduled for outpatient nuclear stress test. She was also scheduled for sleep evaluation, currently scheduled in May 2024.    So far her cardiac monitor has shown episodes of bradycardia, occasionally severe with pauses up to 10 seconds in the early a.m. Today, the patient denies syncope or near syncope. She has had a couple of minor lightheaded spells.    The patient feels significantly improved compared to the days preceding her hospitalization, though not back to normal. She is physically inactive. No orthopnea or PND.    She  lives in [place] with her .  No ETOH since 11/2023. Used to smoke, though not since February..      PHYSICAL EXAMINATION:  Vital signs: 92/56, 90 and irregular, 136.6 kg, height 155 cm, BMI 57.  General: very pleasant woman, severely overweight, in no apparent distress  ENT/Mouth:  no nasal discharge.  Eyes:  normal conjunctivae.   Neck:  no thyromegaly or lymphadenopathy.  Chest/Lungs:  patient is not dyspneic.  Lungs CTA, without rales or wheezing  Cardiovascular: normal JVP, rhythm is irregular.  No gallop, 1/6 systolic murmur, no rub.    Abdomen: severe abdominal obesity  Extremities: trace LE edema  Skin:  no xanthelasma.    Neurologic:  alert & oriented x 3.  No tremor.    Vascular:  2+ carotids without bruits.  2+ radials.        DIAGNOSTIC STUDIES:  Laboratory studies: sodium 141, potassium 4.1, creatinine 0.8, hematocrit 39.8%, platelets 254K  ECG (02/2024): AF with RVR  Echocardiogram (02/2024): LVEF 55 - 60%, mild LVH, mild aortic stenosis and regurgitation      IMPRESSION:  Persistent atrial fibrillation with RVR. DESIRE thrombus. On appropriate dose of apixaban. Current rate control is too tight.  We need to decrease rate control medications. Severe nocturnal bradycardia is almost certainly related to severe CATARINO. Of note, she has not had syncope or near syncope. There is no immediate need for pacemaker implantation.  Chronic diastolic heart failure.  Asymptomatic NSVT.  Morbid obesity.  COPD.  Mild aortic valve disease.    RECOMMENDATIONS:  Stop digoxin.  Decrease Toprol-XL from 25 mg BID to 25 mg daily.  Continue other cardiac medications including apixaban, diltiazem, furosemide.  The patient is on a waiting list for sleep evaluation at [facility], to be done in May at the latest.  See [facility] in [place] toward the end of March and discuss ADAMARIS-guided cardioversion. High probability of AF recurrence post cardioversion, but reasonable to perform this without AA drug on board since this is her  first AF event.    It was my pleasure seeing this delightful patient.  Please feel free to call with any questions.     William Stevens MD, [facility]      (Chart documentation was completed, in part, using Dragon voice-recognition software. The note was reviewed, however grammatical and spelling errors may be present.)      CURRENT MEDICATIONS:  Current Outpatient Medications   Medication Sig Dispense Refill    albuterol (VENTOLIN HFA) 108 (90 Base) MCG/ACT inhaler Inhale 2 puffs into the lungs every 6 hours as needed for shortness of breath, wheezing or cough 18 g 0    apixaban ANTICOAGULANT (ELIQUIS) 5 MG tablet Take 1 tablet (5 mg) by mouth 2 times daily 60 tablet 0    atorvastatin (LIPITOR) 10 MG tablet Take 1 tablet (10 mg) by mouth every evening 30 tablet 0    cetirizine (ZYRTEC) 10 MG tablet Take 1 tablet (10 mg) by mouth daily 30 tablet 0    digoxin (LANOXIN) 125 MCG tablet Take 1 tablet (125 mcg) by mouth daily 30 tablet 0    diltiazem ER COATED BEADS (CARDIZEM CD/CARTIA XT) 240 MG 24 hr capsule Take 1 capsule (240 mg) by mouth daily 30 capsule 0    fluticasone (FLONASE) 50 MCG/ACT nasal spray Spray 1 spray into both nostrils daily as needed for rhinitis or allergies      furosemide (LASIX) 40 MG tablet Take 1 tablet (40 mg) by mouth daily 30 tablet 0    metoprolol succinate ER (TOPROL XL) 25 MG 24 hr tablet Take 1 tablet (25 mg) by mouth 2 times daily 60 tablet 0       ALLERGIES     Allergies   Allergen Reactions    Morphine      rash    Fluoxetine Rash    Percocet [Oxycodone-Acetaminophen] Rash       PAST MEDICAL HISTORY:  No past medical history on file.    PAST SURGICAL HISTORY:  Past Surgical History:   Procedure Laterality Date    CHOLECYSTECTOMY  2004       FAMILY HISTORY:  Family History   Problem Relation Age of Onset    Family History Negative Mother     Family History Negative Father     Prostate Cancer Paternal Grandfather 88       SOCIAL HISTORY:  Social History     Socioeconomic History     Marital status: Single   Tobacco Use    Smoking status: Every Day     Packs/day: .5     Types: Cigarettes    Smokeless tobacco: Never    Tobacco comments:     1 pack a week   Substance and Sexual Activity    Alcohol use: Yes     Comment: occAS    Drug use: No    Sexual activity: Yes     Partners: Male   Other Topics Concern    Parent/sibling w/ CABG, MI or angioplasty before 65F 55M? No       Review of Systems:  Skin:          Eyes:         ENT:         Respiratory:          Cardiovascular:         Gastroenterology:        Genitourinary:         Musculoskeletal:         Neurologic:         Psychiatric:         Heme/Lymph/Imm:         Endocrine:           Physical Exam:  Vitals: LMP 12/14/2020     Constitutional:           Skin:             Head:           Eyes:           Lymph:      ENT:           Neck:           Respiratory:            Cardiac:                                                           GI:           Extremities and Muscular Skeletal:                 Neurological:           Psych:           CC  No referring provider defined for this encounter.        Thank you for allowing me to participate in the care of your patient.      Sincerely,     William Stevens MD     [facility]

## 2024-03-07 NOTE — PATIENT INSTRUCTIONS
Stop digoxin.  Decrease metoprolol from 25 mg twice daily to only once daily (take in the am).  Continue diltiazem, furosemide and Eliquis.   Follow-up with cardiology in the end of March.

## 2024-03-07 NOTE — TELEPHONE ENCOUNTER
Health Call Center    Phone Message    May a detailed message be left on voicemail: no     Reason for Call: Other: Corina from [facility] called regarding urgent results of EKG monitor. Please reach out to Jamal at [phone] option 1. Also called priority line and they gave me a number to send Corina to leave a voice mail. Sent Corina to [phone]. Thank you      Action Taken: Other: cardiology     Travel Screening: Not Applicable    Thank you!  Specialty Access Center

## 2024-03-08 ENCOUNTER — LAB (OUTPATIENT)
Dept: LAB | Facility: CLINIC | Age: 54
End: 2024-03-08
Payer: COMMERCIAL

## 2024-03-08 DIAGNOSIS — I48.91 ATRIAL FIBRILLATION WITH RVR (H): ICD-10-CM

## 2024-03-08 DIAGNOSIS — I51.3 THROMBUS OF LEFT ATRIAL APPENDAGE: ICD-10-CM

## 2024-03-08 LAB
ERYTHROCYTE [DISTWIDTH] IN BLOOD BY AUTOMATED COUNT: 14.3 % (ref 10–15)
HCT VFR BLD AUTO: 43.1 % (ref 35–47)
HGB BLD-MCNC: 13.8 G/DL (ref 11.7–15.7)
MCH RBC QN AUTO: 29.2 PG (ref 26.5–33)
MCHC RBC AUTO-ENTMCNC: 32 G/DL (ref 31.5–36.5)
MCV RBC AUTO: 91 FL (ref 78–100)
PLATELET # BLD AUTO: 235 10E3/UL (ref 150–450)
RBC # BLD AUTO: 4.72 10E6/UL (ref 3.8–5.2)
WBC # BLD AUTO: 7.8 10E3/UL (ref 4–11)

## 2024-03-08 PROCEDURE — 80048 BASIC METABOLIC PNL TOTAL CA: CPT

## 2024-03-08 PROCEDURE — 85027 COMPLETE CBC AUTOMATED: CPT

## 2024-03-08 PROCEDURE — 36415 COLL VENOUS BLD VENIPUNCTURE: CPT

## 2024-03-09 LAB
ANION GAP SERPL CALCULATED.3IONS-SCNC: 12 MMOL/L (ref 7–15)
BUN SERPL-MCNC: 13.8 MG/DL (ref 6–20)
CALCIUM SERPL-MCNC: 8.8 MG/DL (ref 8.6–10)
CHLORIDE SERPL-SCNC: 104 MMOL/L (ref 98–107)
CREAT SERPL-MCNC: 0.86 MG/DL (ref 0.51–0.95)
DEPRECATED HCO3 PLAS-SCNC: 25 MMOL/L (ref 22–29)
EGFRCR SERPLBLD CKD-EPI 2021: 81 ML/MIN/1.73M2
GLUCOSE SERPL-MCNC: 92 MG/DL (ref 70–99)
POTASSIUM SERPL-SCNC: 4.5 MMOL/L (ref 3.4–5.3)
SODIUM SERPL-SCNC: 141 MMOL/L (ref 135–145)

## 2024-03-12 NOTE — TELEPHONE ENCOUNTER
March 13, 2024 Left message for patient to return call to check if she was symptomatic at time of 6.4 second pause.  Estelle Sun RN on 3/13/2024 at 8:48 AM        Urgent Bio Tel call at 4:55 p.m.   3-12-24 At 6:26 am patient had a 6.4 second pause underlying rhythm is AF and 85 bpm.      Attempted to reach patient on cell phone [phone] left message on Voicemail to call nurse bower [phone].

## 2024-03-12 NOTE — TELEPHONE ENCOUNTER
Reviewed urgent biotel findings from 6:26am with Dr Stevens. He was informed that an attempt was made to contact patient to see if she was sleeping or awake and if she had any symptoms without success.  Awaiting call back from patient to get more information and to be sure she did stop medication as directed at OV on 3/7.  Will update Dr Stevens when we hear back from patient.  ANURAG Urbano

## 2024-03-13 NOTE — TELEPHONE ENCOUNTER
Spoke pt who states that at 0626 she was sleeping and unaware of her Heart rate. Discussed with pt that she may want to call [facility] to see if she could do a sleep study sooner d/t to all of her pauses while she is sleeping. Pt currently looks to be scheduled at 5/10. Pt will give them a call. Zulay Stevens made aware. Zulay

## 2024-03-15 ENCOUNTER — TELEPHONE (OUTPATIENT)
Dept: CARDIOLOGY | Facility: CLINIC | Age: 54
End: 2024-03-15
Payer: COMMERCIAL

## 2024-03-15 NOTE — TELEPHONE ENCOUNTER
3/15/24 Attempted to call pt but reached GERMAN MOLINA and requested callback.ASAP for symptom update  Fox 1004 am

## 2024-03-15 NOTE — TELEPHONE ENCOUNTER
RN received request for any RN to pickup urgent call from [employer]. RN took report that patient had auto triggered event at 3:37AM that showed 8 second pause that offset into afib with HR of 44BPM (no symptoms reported). RN will send to our EP team who is following patient for further review.

## 2024-03-18 ENCOUNTER — TELEPHONE (OUTPATIENT)
Dept: CARDIOLOGY | Facility: CLINIC | Age: 54
End: 2024-03-18
Payer: COMMERCIAL

## 2024-03-18 NOTE — TELEPHONE ENCOUNTER
Urgent Report received from [employer] for 8.2s pause that occurred on 3/18/24 at 0306.  This is not a new finding. Patient met with Dr. Stevens on 3/7/24:          Will continue to monitor. For daytime and/or longer pauses.  VINH OSBORN

## 2024-03-19 ENCOUNTER — TELEPHONE (OUTPATIENT)
Dept: CARDIOLOGY | Facility: CLINIC | Age: 54
End: 2024-03-19
Payer: COMMERCIAL

## 2024-03-19 DIAGNOSIS — I51.3 THROMBUS OF LEFT ATRIAL APPENDAGE: ICD-10-CM

## 2024-03-19 DIAGNOSIS — G47.33 OSA (OBSTRUCTIVE SLEEP APNEA): ICD-10-CM

## 2024-03-19 DIAGNOSIS — I48.91 ATRIAL FIBRILLATION WITH RVR (H): ICD-10-CM

## 2024-03-19 RX ORDER — DILTIAZEM HYDROCHLORIDE 240 MG/1
240 CAPSULE, COATED, EXTENDED RELEASE ORAL DAILY
Qty: 60 CAPSULE | Refills: 0 | Status: SHIPPED | OUTPATIENT
Start: 2024-03-19 | End: 2024-04-24

## 2024-03-19 RX ORDER — METOPROLOL SUCCINATE 25 MG/1
25 TABLET, EXTENDED RELEASE ORAL DAILY
Qty: 60 TABLET | Refills: 0 | Status: SHIPPED | OUTPATIENT
Start: 2024-03-19 | End: 2024-04-24

## 2024-03-19 RX ORDER — FUROSEMIDE 40 MG
40 TABLET ORAL DAILY
Qty: 60 TABLET | Refills: 0 | Status: SHIPPED | OUTPATIENT
Start: 2024-03-19 | End: 2024-05-20

## 2024-03-19 NOTE — TELEPHONE ENCOUNTER
Patient called in need of refills to get her through until her OV with Nuzhat Bermudez, MAYA on 4/24.  Updated medication list to reflect changes from OV with Dr Stevens on 3/7.  Will send 60 day supply.  ANURAG Urbano

## 2024-03-20 ENCOUNTER — TELEPHONE (OUTPATIENT)
Dept: CARDIOLOGY | Facility: CLINIC | Age: 54
End: 2024-03-20
Payer: COMMERCIAL

## 2024-03-20 NOTE — TELEPHONE ENCOUNTER
Another [employer] alert with night time pause up to 8.2s without symptoms.  Pt known for nocturnal pauses and being worked up by pulmonary and medications being decreased.      Will continue to monitor.

## 2024-03-20 NOTE — TELEPHONE ENCOUNTER
Health Call Center    Phone Message    May a detailed message be left on voicemail: yes     Reason for Call: Other: Joyce called requesting to speak with Irving in regards to an urgent EKG reading. Recording came at 6:54 AM, 6.7 second pulse with underline rhythm being atrial fib. This was an auto triggered event. Please call back to further discuss.     Action Taken: Message routed to:  Other: Cardiology    Travel Screening: Not Applicable    Thank you!  [facility]

## 2024-03-25 ENCOUNTER — TELEPHONE (OUTPATIENT)
Dept: CARDIOLOGY | Facility: CLINIC | Age: 54
End: 2024-03-25
Payer: COMMERCIAL

## 2024-03-25 NOTE — TELEPHONE ENCOUNTER
[facility] Call Center    Phone Message    May a detailed message be left on voicemail: yes     Reason for Call: Other: Patient would like a call back to discuss medication.     Action Taken: Other: Cardiology    Travel Screening: Not Applicable    Thank you!  [facility]

## 2024-03-26 NOTE — TELEPHONE ENCOUNTER
3/26/24 Pt called back and LVM on Afib RN line , attempted to call her back, Left another message requesting callback Fox 350 pm

## 2024-04-08 NOTE — TELEPHONE ENCOUNTER
4/8/24 Pt stated she has some prescription questions but spoke w pharmacist and now everything is cleared up. She stated she has no questions at this time.  Reminded of next follow up w Nuzhat MAKI in [place] at end of month  KHerroRN 1050 am

## 2024-04-24 ENCOUNTER — OFFICE VISIT (OUTPATIENT)
Dept: CARDIOLOGY | Facility: CLINIC | Age: 54
End: 2024-04-24
Payer: COMMERCIAL

## 2024-04-24 VITALS
DIASTOLIC BLOOD PRESSURE: 72 MMHG | BODY MASS INDEX: 54.73 KG/M2 | OXYGEN SATURATION: 92 % | HEIGHT: 61 IN | WEIGHT: 289.9 LBS | HEART RATE: 111 BPM | SYSTOLIC BLOOD PRESSURE: 98 MMHG

## 2024-04-24 DIAGNOSIS — I51.3 THROMBUS OF LEFT ATRIAL APPENDAGE: ICD-10-CM

## 2024-04-24 DIAGNOSIS — E78.5 HYPERLIPIDEMIA LDL GOAL <100: ICD-10-CM

## 2024-04-24 DIAGNOSIS — I48.19 PERSISTENT ATRIAL FIBRILLATION (H): Primary | ICD-10-CM

## 2024-04-24 DIAGNOSIS — R07.89 ATYPICAL CHEST PAIN: ICD-10-CM

## 2024-04-24 DIAGNOSIS — G47.33 OSA (OBSTRUCTIVE SLEEP APNEA): ICD-10-CM

## 2024-04-24 DIAGNOSIS — I50.31 ACUTE DIASTOLIC HEART FAILURE (H): ICD-10-CM

## 2024-04-24 DIAGNOSIS — E66.01 MORBID OBESITY (H): ICD-10-CM

## 2024-04-24 PROBLEM — I50.30 DIASTOLIC HEART FAILURE (H): Status: ACTIVE | Noted: 2024-04-24

## 2024-04-24 PROCEDURE — 93000 ELECTROCARDIOGRAM COMPLETE: CPT | Performed by: NURSE PRACTITIONER

## 2024-04-24 PROCEDURE — 99214 OFFICE O/P EST MOD 30 MIN: CPT | Performed by: NURSE PRACTITIONER

## 2024-04-24 RX ORDER — ATORVASTATIN CALCIUM 10 MG/1
10 TABLET, FILM COATED ORAL EVERY EVENING
Qty: 90 TABLET | Refills: 3 | Status: SHIPPED | OUTPATIENT
Start: 2024-04-24

## 2024-04-24 RX ORDER — METOPROLOL SUCCINATE 25 MG/1
25 TABLET, EXTENDED RELEASE ORAL EVERY EVENING
Qty: 90 TABLET | Refills: 3 | Status: ON HOLD | OUTPATIENT
Start: 2024-04-24 | End: 2024-05-15

## 2024-04-24 RX ORDER — DILTIAZEM HYDROCHLORIDE 240 MG/1
240 CAPSULE, COATED, EXTENDED RELEASE ORAL DAILY
Qty: 90 CAPSULE | Refills: 3 | Status: ON HOLD | OUTPATIENT
Start: 2024-04-24 | End: 2024-05-15

## 2024-04-24 NOTE — LETTER
4/24/2024    [facility]  [address] [address] [address]    RE: Shilpa Miramontes       Dear Colleague,     I had the pleasure of seeing Shilpa Miramontes in the [facility].  CARDIOLOGY CLINIC NOTE    PRIMARY CARDIOLOGIST  Dr. Wick/ Dr. Stevens    PRIMARY CARE PHYSICIAN:  [facility]    HISTORY OF PRESENT ILLNESS:  Shilpa Miramontes is a very pleasant 54-year-old female with a past medical history significant for nonrheumatic aortic valve stenosis, undiagnosed COPD, former smoker, hypertension, hyperlipidemia, and morbid obesity.     She presented to [facility] on 2/14/2024 with shortness of breath, found to be in acute diastolic heart failure and atrial fibrillation with RVR.  Echocardiogram showed a normal ejection fraction estimated at 55 to 60%, mild aortic stenosis with a mean gradient of 17 mmHg and mild aortic regurgitation, mildly dilated left atrium and mild mitral regurgitation.  She was aggressively diuresed and transitioned to oral lasix.  Rate control was initiated with diltiazem, metoprolol and digoxin.  She has a [id number] 4 and initiated on Eliquis.  A ADAMARIS/cardioversion was recommended but ADAMARIS confirmed a thrombus in the left atrial appendage.  Unfortunately, she developed bradycardia at night with pauses.  There was a strong suspicion for obstructive sleep apnea, outpatient sleep study was scheduled.     She was seen in outpatient follow-up with Dr. Stevens () on 3/7/2020 who reviewed her episodes of bradycardia, pauses up to 10 seconds in early a.m., asymptomatic runs of NSVT, and management of her atrial fibrillation.  It was felt her rate control was too tight and digoxin was discontinued and Toprol 25 mg was decreased from twice daily to daily.     She returns to the office today for follow-up.  She states she certainly feels better from being in the hospital but continues with exertional shortness of breath,  palpitations, and intermittent chest pain underneath her left breast.  She uses home oxygen intermittently.  She was recommended for a Lexiscan stress test which has not been scheduled.   She sleeps poorly and scheduled on May 10 with sleep medicine at [facility].      Upon exam, lungs are clear bilaterally, heart rate and rhythm irregular, difficult to assess JVD, morbidly obese abdomen and trace bilateral ankle edema.  Weight is 289 pounds, down 4 pounds since discharge.    Blood pressure is low at 92/70 with repeat reading 98/72.   EKG today shows atrial fibrillation with a rate of 111  Last BMP and CBC were unremarkable    Quit smoking and alcohol use 6 weeks ago  Working on low sodium diet.  Compliant with all medication.    PAST MEDICAL HISTORY:  History reviewed. No pertinent past medical history.    MEDICATIONS:  Current Outpatient Medications   Medication Sig Dispense Refill    albuterol (VENTOLIN HFA) 108 (90 Base) MCG/ACT inhaler Inhale 2 puffs into the lungs every 6 hours as needed for shortness of breath, wheezing or cough 18 g 0    apixaban ANTICOAGULANT (ELIQUIS) 5 MG tablet Take 1 tablet (5 mg) by mouth 2 times daily 120 tablet 0    atorvastatin (LIPITOR) 10 MG tablet Take 1 tablet (10 mg) by mouth every evening 90 tablet 3    cetirizine (ZYRTEC) 10 MG tablet Take 1 tablet (10 mg) by mouth daily 30 tablet 0    diltiazem ER COATED BEADS (CARDIZEM CD/CARTIA XT) 240 MG 24 hr capsule Take 1 capsule (240 mg) by mouth daily 90 capsule 3    fluticasone (FLONASE) 50 MCG/ACT nasal spray Spray 1 spray into both nostrils daily as needed for rhinitis or allergies      furosemide (LASIX) 40 MG tablet Take 1 tablet (40 mg) by mouth daily 60 tablet 0    metoprolol succinate ER (TOPROL XL) 25 MG 24 hr tablet Take 1 tablet (25 mg) by mouth every evening 90 tablet 3     No current facility-administered medications for this visit.       SOCIAL HISTORY:  I have reviewed this patient's social history and updated it  with pertinent information if needed. Shilpa Miramontes  reports that she has been smoking cigarettes. She has never used smokeless tobacco. She reports current alcohol use. She reports that she does not use drugs.    PHYSICAL EXAM:  Pulse:  [] 111  BP: (92-98)/(70-72) 98/72  SpO2:  [92 %] 92 %  289 lbs 14.4 oz    Constitutional: alert, no distress  Respiratory: Good bilateral air entry  Cardiovascular: Irregular rate and rhythm  GI: nondistended  Neuropsychiatric: appropriate affact    ASSESSMENT/PLAN:  Pertinent issues addressed/ reviewed during this cardiology visit  Atrial fibrillation -EKG today confirms A-fib with RVR, rate 111.  Will avoid increasing metoprolol due to bradycardia at night and significant pauses (10 sec).  Recommend a rhythm control strategy with ADAMARIS/cardioversion as recommended by Dr. Stevens. I have reviewed the risks of the procedure including   damage to the oral cavity, esophageal perforation, GI bleeding, pharyngeal hematoma, transient bronchospasm, transient hypoxia, arrhthymias (NSVT, transient atrial fibrillation), vomiting, hemoptysis, and complications from anesthesia, skin irritation, stroke, and bradycardia. Patient has no history of esophageal stricture, odynphagia, dysphagia, medication allergies. There is a strong suspicion of sleep apnea, Likely need anesthesia. Patient understands the risks of the procedure and wishes to proceed. NPO after midnight the evening prior to procedure. Hold diltiazem the morning of procedure. Take AM medications including Eliquis the morning or procedure with a small sip of water.     2. Diastolic heart failure -  Euvolemic upon exam, weights stable. She does not appear in decompensated heart failure. Continue current dose of lasix and metoprolol. Should she continue with borderline low blood pressure, consider reducing lasix to 20 mg daily.     3. DESIRE thrombus - Continue Eliquis. Follow up ADAMARIS     3. Atypical Chest pain - intermittent.  Recommend a nuclear stress test to rule out ischemia.   4.  Aortic Stenosis - Echocardiogram showed mild aortic stenosis with a mean gradient of 17 mmHg and mild aortic regurgitation.   5. Possible COPD/ sleep apnea - Follow up with pulmonary/sleep medicine May 10th. Continue night-time oxygen.   6.  Hyperlipidemia - low dose atorvastatin.   7.  Tobacco abuse - quit 6 weeks ago.      Follow up in 1 month with MAYA post ADAMARIS/CV and lexiscan.        It was a pleasure seeing this patient in clinic today. Please do not hesitate to contact me with any future questions.     JOYCE Alegria, CNP  Cardiology - [facility]  04/24/2024       The level of medical decision making during this visit was of moderate complexity.    This note was completed in part using dictation via the Dragon voice recognition software. Some word and grammatical errors may occur and must be interpreted in the appropriate clinical context.  If there are any questions pertaining to this issue, please contact me for further clarification.      Thank you for allowing me to participate in the care of your patient.      Sincerely,     JOYCE Alegria CNP     [facility]  cc:   William Stevens MD  [address]  [place] [address]

## 2024-04-24 NOTE — PROGRESS NOTES
CARDIOLOGY CLINIC NOTE    PRIMARY CARDIOLOGIST  Dr. Wick/ Dr. Stevens    PRIMARY CARE PHYSICIAN:  [facility]    HISTORY OF PRESENT ILLNESS:  Shilpa Miramontes is a very pleasant 54-year-old female with a past medical history significant for nonrheumatic aortic valve stenosis, undiagnosed COPD, former smoker, hypertension, hyperlipidemia, and morbid obesity.     She presented to [facility] on 2/14/2024 with shortness of breath, found to be in acute diastolic heart failure and atrial fibrillation with RVR.  Echocardiogram showed a normal ejection fraction estimated at 55 to 60%, mild aortic stenosis with a mean gradient of 17 mmHg and mild aortic regurgitation, mildly dilated left atrium and mild mitral regurgitation.  She was aggressively diuresed and transitioned to oral lasix.  Rate control was initiated with diltiazem, metoprolol and digoxin.  She has a [id number] 4 and initiated on Eliquis.  A ADAMARIS/cardioversion was recommended but ADAMARIS confirmed a thrombus in the left atrial appendage.  Unfortunately, she developed bradycardia at night with pauses.  There was a strong suspicion for obstructive sleep apnea, outpatient sleep study was scheduled.     She was seen in outpatient follow-up with Dr. Stevens (EP) on 3/7/2020 who reviewed her episodes of bradycardia, pauses up to 10 seconds in early a.m., asymptomatic runs of NSVT, and management of her atrial fibrillation.  It was felt her rate control was too tight and digoxin was discontinued and Toprol 25 mg was decreased from twice daily to daily.     She returns to the office today for follow-up.  She states she certainly feels better from being in the hospital but continues with exertional shortness of breath, palpitations, and intermittent chest pain underneath her left breast.  She uses home oxygen intermittently.  She was recommended for a Lexiscan stress test which has not been scheduled.   She sleeps poorly and scheduled on May 10  with sleep medicine at [facility].      Upon exam, lungs are clear bilaterally, heart rate and rhythm irregular, difficult to assess JVD, morbidly obese abdomen and trace bilateral ankle edema.  Weight is 289 pounds, down 4 pounds since discharge.    Blood pressure is low at 92/70 with repeat reading 98/72.   EKG today shows atrial fibrillation with a rate of 111  Last BMP and CBC were unremarkable    Quit smoking and alcohol use 6 weeks ago  Working on low sodium diet.  Compliant with all medication.    PAST MEDICAL HISTORY:  History reviewed. No pertinent past medical history.    MEDICATIONS:  Current Outpatient Medications   Medication Sig Dispense Refill    albuterol (VENTOLIN HFA) 108 (90 Base) MCG/ACT inhaler Inhale 2 puffs into the lungs every 6 hours as needed for shortness of breath, wheezing or cough 18 g 0    apixaban ANTICOAGULANT (ELIQUIS) 5 MG tablet Take 1 tablet (5 mg) by mouth 2 times daily 120 tablet 0    atorvastatin (LIPITOR) 10 MG tablet Take 1 tablet (10 mg) by mouth every evening 90 tablet 3    cetirizine (ZYRTEC) 10 MG tablet Take 1 tablet (10 mg) by mouth daily 30 tablet 0    diltiazem ER COATED BEADS (CARDIZEM CD/CARTIA XT) 240 MG 24 hr capsule Take 1 capsule (240 mg) by mouth daily 90 capsule 3    fluticasone (FLONASE) 50 MCG/ACT nasal spray Spray 1 spray into both nostrils daily as needed for rhinitis or allergies      furosemide (LASIX) 40 MG tablet Take 1 tablet (40 mg) by mouth daily 60 tablet 0    metoprolol succinate ER (TOPROL XL) 25 MG 24 hr tablet Take 1 tablet (25 mg) by mouth every evening 90 tablet 3     No current facility-administered medications for this visit.       SOCIAL HISTORY:  I have reviewed this patient's social history and updated it with pertinent information if needed. Shilpa Miramontes  reports that she has been smoking cigarettes. She has never used smokeless tobacco. She reports current alcohol use. She reports that she does not use drugs.    PHYSICAL  EXAM:  Pulse:  [] 111  BP: (92-98)/(70-72) 98/72  SpO2:  [92 %] 92 %  289 lbs 14.4 oz    Constitutional: alert, no distress  Respiratory: Good bilateral air entry  Cardiovascular: Irregular rate and rhythm  GI: nondistended  Neuropsychiatric: appropriate affact    ASSESSMENT/PLAN:  Pertinent issues addressed/ reviewed during this cardiology visit  Atrial fibrillation -EKG today confirms A-fib with RVR, rate 111.  Will avoid increasing metoprolol due to bradycardia at night and significant pauses (10 sec).  Recommend a rhythm control strategy with ADAMARIS/cardioversion as recommended by Dr. Stevens. I have reviewed the risks of the procedure including   damage to the oral cavity, esophageal perforation, GI bleeding, pharyngeal hematoma, transient bronchospasm, transient hypoxia, arrhthymias (NSVT, transient atrial fibrillation), vomiting, hemoptysis, and complications from anesthesia, skin irritation, stroke, and bradycardia. Patient has no history of esophageal stricture, odynphagia, dysphagia, medication allergies. There is a strong suspicion of sleep apnea, Likely need anesthesia. Patient understands the risks of the procedure and wishes to proceed. NPO after midnight the evening prior to procedure. Hold diltiazem the morning of procedure. Take AM medications including Eliquis the morning or procedure with a small sip of water.     2. Diastolic heart failure -  Euvolemic upon exam, weights stable. She does not appear in decompensated heart failure. Continue current dose of lasix and metoprolol. Should she continue with borderline low blood pressure, consider reducing lasix to 20 mg daily.     3. DESIRE thrombus - Continue Eliquis. Follow up ADAMARIS     3. Atypical Chest pain - intermittent. Recommend a nuclear stress test to rule out ischemia.   4.  Aortic Stenosis - Echocardiogram showed mild aortic stenosis with a mean gradient of 17 mmHg and mild aortic regurgitation.   5. Possible COPD/ sleep apnea - Follow up with  pulmonary/sleep medicine May 10th. Continue night-time oxygen.   6.  Hyperlipidemia - low dose atorvastatin.   7.  Tobacco abuse - quit 6 weeks ago.      Follow up in 1 month with MAYA post ADAMARIS/CV and lexiscan.        It was a pleasure seeing this patient in clinic today. Please do not hesitate to contact me with any future questions.     JOYCE Alegria, CNP  Cardiology - [facility]  04/24/2024       The level of medical decision making during this visit was of moderate complexity.    This note was completed in part using dictation via the Dragon voice recognition software. Some word and grammatical errors may occur and must be interpreted in the appropriate clinical context.  If there are any questions pertaining to this issue, please contact me for further clarification.

## 2024-04-24 NOTE — PATIENT INSTRUCTIONS
Thanks for participating in a office visit with the [facility] clinic today.    EKG today confirms A-fib with sub optimal rate despite rate controlling agents.   Recommend a rhythm control strategy with ADAMARIS/cardioversion. Risks and benefits reviewed with verbal understanding.   Nothing to eat after midnight the evening prior to procedure.   Hold diltiazem the evening prior to procedure.   Take AM medication including Elqiuis with a small sip of water on the day of procedure.   Follow up stress test, after cardioversion.   Continue current medical therapy  Follow up with sleep medicine for evaluation of sleep apnea.     Follow up in 1 month with MAYA     Please call my nurse at  [phone] with any questions or concerns.    Scheduling phone number: [phone]  Reminder: Please bring in all current medications, over the counter supplements and vitamin bottles to your next appointment.

## 2024-04-26 ENCOUNTER — HOSPITAL ENCOUNTER (OUTPATIENT)
Facility: CLINIC | Age: 54
End: 2024-04-26
Admitting: INTERNAL MEDICINE
Payer: COMMERCIAL

## 2024-04-26 ENCOUNTER — HOSPITAL ENCOUNTER (OUTPATIENT)
Facility: CLINIC | Age: 54
End: 2024-04-26
Payer: COMMERCIAL

## 2024-05-01 DIAGNOSIS — I51.3 THROMBUS OF LEFT ATRIAL APPENDAGE: ICD-10-CM

## 2024-05-01 DIAGNOSIS — I48.91 ATRIAL FIBRILLATION WITH RVR (H): ICD-10-CM

## 2024-05-01 DIAGNOSIS — G47.33 OSA (OBSTRUCTIVE SLEEP APNEA): ICD-10-CM

## 2024-05-12 ENCOUNTER — ANCILLARY PROCEDURE (OUTPATIENT)
Dept: ULTRASOUND IMAGING | Facility: CLINIC | Age: 54
End: 2024-05-12
Attending: STUDENT IN AN ORGANIZED HEALTH CARE EDUCATION/TRAINING PROGRAM
Payer: COMMERCIAL

## 2024-05-12 ENCOUNTER — APPOINTMENT (OUTPATIENT)
Dept: GENERAL RADIOLOGY | Facility: CLINIC | Age: 54
End: 2024-05-12
Attending: STUDENT IN AN ORGANIZED HEALTH CARE EDUCATION/TRAINING PROGRAM
Payer: COMMERCIAL

## 2024-05-12 ENCOUNTER — HOSPITAL ENCOUNTER (INPATIENT)
Facility: CLINIC | Age: 54
LOS: 3 days | Discharge: HOME | End: 2024-05-15
Attending: STUDENT IN AN ORGANIZED HEALTH CARE EDUCATION/TRAINING PROGRAM | Admitting: STUDENT IN AN ORGANIZED HEALTH CARE EDUCATION/TRAINING PROGRAM
Payer: COMMERCIAL

## 2024-05-12 DIAGNOSIS — J44.9 CHRONIC OBSTRUCTIVE PULMONARY DISEASE, UNSPECIFIED COPD TYPE (H): ICD-10-CM

## 2024-05-12 DIAGNOSIS — I48.91 ATRIAL FIBRILLATION WITH RVR (H): ICD-10-CM

## 2024-05-12 DIAGNOSIS — R07.89 ATYPICAL CHEST PAIN: ICD-10-CM

## 2024-05-12 DIAGNOSIS — J96.21 ACUTE ON CHRONIC RESPIRATORY FAILURE WITH HYPOXEMIA (H): Primary | ICD-10-CM

## 2024-05-12 PROBLEM — I35.0 NONRHEUMATIC AORTIC VALVE STENOSIS: Status: ACTIVE | Noted: 2023-10-12

## 2024-05-12 PROBLEM — R73.03 PRE-DIABETES: Status: ACTIVE | Noted: 2021-02-13

## 2024-05-12 PROBLEM — I10 ESSENTIAL HYPERTENSION: Status: ACTIVE | Noted: 2021-01-21

## 2024-05-12 LAB
ANION GAP SERPL CALCULATED.3IONS-SCNC: 16 MMOL/L (ref 7–15)
BASE EXCESS BLDV CALC-SCNC: 3.6 MMOL/L (ref -3–3)
BASOPHILS # BLD AUTO: 0 10E3/UL (ref 0–0.2)
BASOPHILS NFR BLD AUTO: 1 %
BUN SERPL-MCNC: 28.3 MG/DL (ref 6–20)
CALCIUM SERPL-MCNC: 8.7 MG/DL (ref 8.6–10)
CHLORIDE SERPL-SCNC: 104 MMOL/L (ref 98–107)
CREAT SERPL-MCNC: 1.13 MG/DL (ref 0.51–0.95)
DEPRECATED HCO3 PLAS-SCNC: 25 MMOL/L (ref 22–29)
EGFRCR SERPLBLD CKD-EPI 2021: 58 ML/MIN/1.73M2
EOSINOPHIL # BLD AUTO: 0.2 10E3/UL (ref 0–0.7)
EOSINOPHIL NFR BLD AUTO: 3 %
ERYTHROCYTE [DISTWIDTH] IN BLOOD BY AUTOMATED COUNT: 14.4 % (ref 10–15)
GLUCOSE SERPL-MCNC: 101 MG/DL (ref 70–99)
HCO3 BLDV-SCNC: 29 MMOL/L (ref 21–28)
HCO3 BLDV-SCNC: 30 MMOL/L (ref 21–28)
HCT VFR BLD AUTO: 43.6 % (ref 35–47)
HGB BLD-MCNC: 13.7 G/DL (ref 11.7–15.7)
HOLD SPECIMEN: 0
HOLD SPECIMEN: NORMAL
IMM GRANULOCYTES # BLD: 0 10E3/UL
IMM GRANULOCYTES NFR BLD: 0 %
LACTATE BLD-SCNC: 0.8 MMOL/L
LYMPHOCYTES # BLD AUTO: 1.9 10E3/UL (ref 0.8–5.3)
LYMPHOCYTES NFR BLD AUTO: 24 %
MAGNESIUM SERPL-MCNC: 2 MG/DL (ref 1.7–2.3)
MCH RBC QN AUTO: 28.8 PG (ref 26.5–33)
MCHC RBC AUTO-ENTMCNC: 31.4 G/DL (ref 31.5–36.5)
MCV RBC AUTO: 92 FL (ref 78–100)
MONOCYTES # BLD AUTO: 0.6 10E3/UL (ref 0–1.3)
MONOCYTES NFR BLD AUTO: 8 %
NEUTROPHILS # BLD AUTO: 5.2 10E3/UL (ref 1.6–8.3)
NEUTROPHILS NFR BLD AUTO: 64 %
NRBC # BLD AUTO: 0 10E3/UL
NRBC BLD AUTO-RTO: 0 /100
NT-PROBNP SERPL-MCNC: 1302 PG/ML (ref 0–900)
O2/TOTAL GAS SETTING VFR VENT: 0 %
OXYHGB MFR BLDV: 52 % (ref 70–75)
PCO2 BLDV: 50 MM HG (ref 40–50)
PCO2 BLDV: 51 MM HG (ref 40–50)
PH BLDV: 7.37 [PH] (ref 7.32–7.43)
PH BLDV: 7.38 [PH] (ref 7.32–7.43)
PLATELET # BLD AUTO: 238 10E3/UL (ref 150–450)
PO2 BLDV: 28 MM HG (ref 25–47)
PO2 BLDV: 30 MM HG (ref 25–47)
POTASSIUM SERPL-SCNC: 4.3 MMOL/L (ref 3.4–5.3)
RBC # BLD AUTO: 4.76 10E6/UL (ref 3.8–5.2)
SAO2 % BLDV: 50 % (ref 70–75)
SAO2 % BLDV: 52.9 % (ref 70–75)
SODIUM SERPL-SCNC: 145 MMOL/L (ref 135–145)
TROPONIN T SERPL HS-MCNC: 16 NG/L
WBC # BLD AUTO: 8 10E3/UL (ref 4–11)

## 2024-05-12 PROCEDURE — 80048 BASIC METABOLIC PNL TOTAL CA: CPT | Performed by: STUDENT IN AN ORGANIZED HEALTH CARE EDUCATION/TRAINING PROGRAM

## 2024-05-12 PROCEDURE — 99285 EMERGENCY DEPT VISIT HI MDM: CPT | Mod: 25

## 2024-05-12 PROCEDURE — 250N000009 HC RX 250: Performed by: STUDENT IN AN ORGANIZED HEALTH CARE EDUCATION/TRAINING PROGRAM

## 2024-05-12 PROCEDURE — 94640 AIRWAY INHALATION TREATMENT: CPT

## 2024-05-12 PROCEDURE — 83880 ASSAY OF NATRIURETIC PEPTIDE: CPT | Performed by: STUDENT IN AN ORGANIZED HEALTH CARE EDUCATION/TRAINING PROGRAM

## 2024-05-12 PROCEDURE — 85025 COMPLETE CBC W/AUTO DIFF WBC: CPT | Performed by: STUDENT IN AN ORGANIZED HEALTH CARE EDUCATION/TRAINING PROGRAM

## 2024-05-12 PROCEDURE — 250N000013 HC RX MED GY IP 250 OP 250 PS 637: Performed by: STUDENT IN AN ORGANIZED HEALTH CARE EDUCATION/TRAINING PROGRAM

## 2024-05-12 PROCEDURE — 258N000003 HC RX IP 258 OP 636: Performed by: STUDENT IN AN ORGANIZED HEALTH CARE EDUCATION/TRAINING PROGRAM

## 2024-05-12 PROCEDURE — 36415 COLL VENOUS BLD VENIPUNCTURE: CPT | Performed by: STUDENT IN AN ORGANIZED HEALTH CARE EDUCATION/TRAINING PROGRAM

## 2024-05-12 PROCEDURE — 82805 BLOOD GASES W/O2 SATURATION: CPT | Performed by: STUDENT IN AN ORGANIZED HEALTH CARE EDUCATION/TRAINING PROGRAM

## 2024-05-12 PROCEDURE — 99223 1ST HOSP IP/OBS HIGH 75: CPT | Performed by: STUDENT IN AN ORGANIZED HEALTH CARE EDUCATION/TRAINING PROGRAM

## 2024-05-12 PROCEDURE — 250N000011 HC RX IP 250 OP 636: Performed by: STUDENT IN AN ORGANIZED HEALTH CARE EDUCATION/TRAINING PROGRAM

## 2024-05-12 PROCEDURE — 96374 THER/PROPH/DIAG INJ IV PUSH: CPT | Mod: 59

## 2024-05-12 PROCEDURE — 120N000001 HC R&B MED SURG/OB

## 2024-05-12 PROCEDURE — 96375 TX/PRO/DX INJ NEW DRUG ADDON: CPT

## 2024-05-12 PROCEDURE — 93005 ELECTROCARDIOGRAM TRACING: CPT

## 2024-05-12 PROCEDURE — 94640 AIRWAY INHALATION TREATMENT: CPT | Mod: 76

## 2024-05-12 PROCEDURE — 93308 TTE F-UP OR LMTD: CPT

## 2024-05-12 PROCEDURE — 999N000157 HC STATISTIC RCP TIME EA 10 MIN

## 2024-05-12 PROCEDURE — 82803 BLOOD GASES ANY COMBINATION: CPT

## 2024-05-12 PROCEDURE — 71046 X-RAY EXAM CHEST 2 VIEWS: CPT

## 2024-05-12 PROCEDURE — 84484 ASSAY OF TROPONIN QUANT: CPT | Performed by: STUDENT IN AN ORGANIZED HEALTH CARE EDUCATION/TRAINING PROGRAM

## 2024-05-12 PROCEDURE — 83735 ASSAY OF MAGNESIUM: CPT | Performed by: STUDENT IN AN ORGANIZED HEALTH CARE EDUCATION/TRAINING PROGRAM

## 2024-05-12 RX ORDER — AMOXICILLIN 250 MG
1 CAPSULE ORAL 2 TIMES DAILY PRN
Status: DISCONTINUED | OUTPATIENT
Start: 2024-05-12 | End: 2024-05-15 | Stop reason: HOSPADM

## 2024-05-12 RX ORDER — AMLODIPINE BESYLATE 5 MG/1
5 TABLET ORAL DAILY
COMMUNITY
Start: 2024-05-02 | End: 2024-05-12

## 2024-05-12 RX ORDER — FLUTICASONE PROPIONATE 50 MCG
1 SPRAY, SUSPENSION (ML) NASAL DAILY PRN
Status: DISCONTINUED | OUTPATIENT
Start: 2024-05-12 | End: 2024-05-15 | Stop reason: HOSPADM

## 2024-05-12 RX ORDER — LIDOCAINE 40 MG/G
CREAM TOPICAL
Status: DISCONTINUED | OUTPATIENT
Start: 2024-05-12 | End: 2024-05-15 | Stop reason: HOSPADM

## 2024-05-12 RX ORDER — ACETAMINOPHEN 650 MG/1
650 SUPPOSITORY RECTAL EVERY 4 HOURS PRN
Status: DISCONTINUED | OUTPATIENT
Start: 2024-05-12 | End: 2024-05-15 | Stop reason: HOSPADM

## 2024-05-12 RX ORDER — AZITHROMYCIN 250 MG/1
500 TABLET, FILM COATED ORAL DAILY
Status: COMPLETED | OUTPATIENT
Start: 2024-05-13 | End: 2024-05-14

## 2024-05-12 RX ORDER — IPRATROPIUM BROMIDE AND ALBUTEROL SULFATE 2.5; .5 MG/3ML; MG/3ML
3 SOLUTION RESPIRATORY (INHALATION)
Status: DISCONTINUED | OUTPATIENT
Start: 2024-05-12 | End: 2024-05-13

## 2024-05-12 RX ORDER — FUROSEMIDE 10 MG/ML
40 INJECTION INTRAMUSCULAR; INTRAVENOUS ONCE
Status: COMPLETED | OUTPATIENT
Start: 2024-05-12 | End: 2024-05-12

## 2024-05-12 RX ORDER — LISINOPRIL 10 MG/1
10 TABLET ORAL DAILY
COMMUNITY
Start: 2024-05-02 | End: 2024-05-12

## 2024-05-12 RX ORDER — PREDNISONE 20 MG/1
40 TABLET ORAL DAILY
Status: DISCONTINUED | OUTPATIENT
Start: 2024-05-13 | End: 2024-05-13

## 2024-05-12 RX ORDER — METOPROLOL SUCCINATE 25 MG/1
25 TABLET, EXTENDED RELEASE ORAL EVERY EVENING
Status: DISCONTINUED | OUTPATIENT
Start: 2024-05-12 | End: 2024-05-12

## 2024-05-12 RX ORDER — METOPROLOL SUCCINATE 25 MG/1
25 TABLET, EXTENDED RELEASE ORAL EVERY EVENING
Status: DISCONTINUED | OUTPATIENT
Start: 2024-05-12 | End: 2024-05-14

## 2024-05-12 RX ORDER — AZITHROMYCIN 500 MG/5ML
500 INJECTION, POWDER, LYOPHILIZED, FOR SOLUTION INTRAVENOUS ONCE
Status: COMPLETED | OUTPATIENT
Start: 2024-05-12 | End: 2024-05-12

## 2024-05-12 RX ORDER — DILTIAZEM HYDROCHLORIDE 240 MG/1
240 CAPSULE, COATED, EXTENDED RELEASE ORAL DAILY
Status: DISCONTINUED | OUTPATIENT
Start: 2024-05-13 | End: 2024-05-13

## 2024-05-12 RX ORDER — CETIRIZINE HYDROCHLORIDE 10 MG/1
10 TABLET ORAL DAILY
Status: DISCONTINUED | OUTPATIENT
Start: 2024-05-12 | End: 2024-05-15 | Stop reason: HOSPADM

## 2024-05-12 RX ORDER — FUROSEMIDE 10 MG/ML
80 INJECTION INTRAMUSCULAR; INTRAVENOUS
Status: DISCONTINUED | OUTPATIENT
Start: 2024-05-13 | End: 2024-05-13

## 2024-05-12 RX ORDER — ATORVASTATIN CALCIUM 10 MG/1
10 TABLET, FILM COATED ORAL EVERY EVENING
Status: DISCONTINUED | OUTPATIENT
Start: 2024-05-12 | End: 2024-05-15 | Stop reason: HOSPADM

## 2024-05-12 RX ORDER — METHYLPREDNISOLONE SODIUM SUCCINATE 125 MG/2ML
60 INJECTION, POWDER, LYOPHILIZED, FOR SOLUTION INTRAMUSCULAR; INTRAVENOUS ONCE
Status: COMPLETED | OUTPATIENT
Start: 2024-05-12 | End: 2024-05-12

## 2024-05-12 RX ORDER — ACETAMINOPHEN 325 MG/1
650 TABLET ORAL EVERY 4 HOURS PRN
Status: DISCONTINUED | OUTPATIENT
Start: 2024-05-12 | End: 2024-05-15 | Stop reason: HOSPADM

## 2024-05-12 RX ORDER — IPRATROPIUM BROMIDE AND ALBUTEROL SULFATE 2.5; .5 MG/3ML; MG/3ML
3 SOLUTION RESPIRATORY (INHALATION)
Status: COMPLETED | OUTPATIENT
Start: 2024-05-12 | End: 2024-05-12

## 2024-05-12 RX ORDER — AMOXICILLIN 250 MG
2 CAPSULE ORAL 2 TIMES DAILY PRN
Status: DISCONTINUED | OUTPATIENT
Start: 2024-05-12 | End: 2024-05-15 | Stop reason: HOSPADM

## 2024-05-12 RX ADMIN — IPRATROPIUM BROMIDE AND ALBUTEROL SULFATE 3 ML: .5; 3 SOLUTION RESPIRATORY (INHALATION) at 15:52

## 2024-05-12 RX ADMIN — IPRATROPIUM BROMIDE AND ALBUTEROL SULFATE 3 ML: .5; 3 SOLUTION RESPIRATORY (INHALATION) at 15:41

## 2024-05-12 RX ADMIN — METHYLPREDNISOLONE SODIUM SUCCINATE 62.5 MG: 125 INJECTION, POWDER, FOR SOLUTION INTRAMUSCULAR; INTRAVENOUS at 15:29

## 2024-05-12 RX ADMIN — FUROSEMIDE 40 MG: 10 INJECTION, SOLUTION INTRAMUSCULAR; INTRAVENOUS at 17:28

## 2024-05-12 RX ADMIN — APIXABAN 5 MG: 5 TABLET, FILM COATED ORAL at 20:43

## 2024-05-12 RX ADMIN — IPRATROPIUM BROMIDE AND ALBUTEROL SULFATE 3 ML: .5; 3 SOLUTION RESPIRATORY (INHALATION) at 15:27

## 2024-05-12 RX ADMIN — IPRATROPIUM BROMIDE AND ALBUTEROL SULFATE 3 ML: .5; 3 SOLUTION RESPIRATORY (INHALATION) at 19:58

## 2024-05-12 RX ADMIN — CETIRIZINE HYDROCHLORIDE 10 MG: 10 TABLET, FILM COATED ORAL at 18:42

## 2024-05-12 RX ADMIN — FUROSEMIDE 40 MG: 10 INJECTION, SOLUTION INTRAMUSCULAR; INTRAVENOUS at 15:33

## 2024-05-12 RX ADMIN — METOPROLOL SUCCINATE 25 MG: 25 TABLET, FILM COATED, EXTENDED RELEASE ORAL at 18:58

## 2024-05-12 RX ADMIN — ATORVASTATIN CALCIUM 10 MG: 10 TABLET, FILM COATED ORAL at 20:43

## 2024-05-12 RX ADMIN — AZITHROMYCIN MONOHYDRATE 500 MG: 500 INJECTION, POWDER, LYOPHILIZED, FOR SOLUTION INTRAVENOUS at 17:27

## 2024-05-12 ASSESSMENT — ACTIVITIES OF DAILY LIVING (ADL)
ADLS_ACUITY_SCORE: 33
ADLS_ACUITY_SCORE: 33
ADLS_ACUITY_SCORE: 38
ADLS_ACUITY_SCORE: 33
ADLS_ACUITY_SCORE: 38
ADLS_ACUITY_SCORE: 31
ADLS_ACUITY_SCORE: 38

## 2024-05-12 NOTE — PHARMACY-ADMISSION MEDICATION HISTORY
Pharmacist Admission Medication History    Admission medication history is complete. The information provided in this note is only as accurate as the sources available at the time of the update.    Information Source(s): [facility] and [facility]Everywhere/[facility]    Pertinent Information: amlodipine and lisinopril were stopped during hospital discharge in February but both of them were recently dispensed per [facility] dispensing records. When asked if patient is still taking those two meds, she was not so sure and thought she might still take them. Per office visit with cardiology in April 2024, the two meds are not on their med list. Patient was told to follow up with the cardiology clinic to see if she's still supposed to take the two meds. Will not add the meds to her list for now.    Changes made to PTA medication list:  Added: None  Deleted: None  Changed: None    Allergies reviewed with patient and updates made in EHR: yes    Medication History Completed By: Lb [facility] 5/12/2024 5:25 PM    PTA Med List   Medication Sig Last Dose    albuterol (VENTOLIN HFA) 108 (90 Base) MCG/ACT inhaler Inhale 2 puffs into the lungs every 6 hours as needed for shortness of breath, wheezing or cough Unknown at PRN    apixaban ANTICOAGULANT (ELIQUIS) 5 MG tablet Take 1 tablet (5 mg) by mouth 2 times daily 5/12/2024 at X1    atorvastatin (LIPITOR) 10 MG tablet Take 1 tablet (10 mg) by mouth every evening 5/11/2024 at HS    cetirizine (ZYRTEC) 10 MG tablet Take 1 tablet (10 mg) by mouth daily 5/11/2024 at AM    diltiazem ER COATED BEADS (CARDIZEM CD/CARTIA XT) 240 MG 24 hr capsule Take 1 capsule (240 mg) by mouth daily 5/12/2024 at AM    fluticasone (FLONASE) 50 MCG/ACT nasal spray Spray 1 spray into both nostrils daily as needed for rhinitis or allergies Unknown at PRN    furosemide (LASIX) 40 MG tablet Take 1 tablet (40 mg) by mouth daily 5/12/2024 at AM    metoprolol succinate ER (TOPROL XL) 25 MG 24 hr  tablet Take 1 tablet (25 mg) by mouth every evening 5/11/2024 at HS

## 2024-05-12 NOTE — ED NOTES
[facility]  ED Nurse Handoff Report    ED Chief complaint: Shortness of Breath  . ED Diagnosis:   Final diagnoses:   Acute on chronic respiratory failure with hypoxemia (H)   Atrial fibrillation with RVR (H)   Chronic obstructive pulmonary disease, unspecified COPD type (H)       Allergies:   Allergies   Allergen Reactions    Morphine      rash    Fluoxetine Rash    Percocet [Oxycodone-Acetaminophen] Rash       Code Status: Full Code    Activity level - Baseline/Home:  independent.  Activity Level - Current:   standby.   Lift room needed: No.   Bariatric: No   Needed: No   Isolation: No.   Infection: Not Applicable.     Respiratory status: Nasal cannula 2LPM    Vital Signs (within 30 minutes):   Vitals:    05/12/24 1543 05/12/24 1616 05/12/24 1623 05/12/24 1633   BP:   103/73    Pulse: (!) 135 (!) 125 (!) 152    Resp:       Temp:       TempSrc:       SpO2: 96%   94%       Cardiac Rhythm:  ,   Cardiac  Cardiac Rhythm: Atrial fibrillation  Pain level:    Patient confused: No.   Patient Falls Risk: nonskid shoes/slippers when out of bed, patient and family education, assistive device/personal items within reach, and activity supervised.   Elimination Status: Has voided     Patient Report - Initial Complaint: Shortness of breath.   Focused Assessment:   Patient presented from urgent care with shortness of breath. Has a hx of CHF Afib and is on eliquis. On arrival patient was noted to have increased work of breathing, was tachypneic, wheezing, and tachycardic. No hx of asthma but endorses seasonal allergies and increased salt consumption during the weekend. 2 LPM N/C, EKG showed Afib RVR    Abnormal Results:   Labs Ordered and Resulted from Time of ED Arrival to Time of ED Departure   BASIC METABOLIC PANEL - Abnormal       Result Value    Sodium 145      Potassium 4.3      Chloride 104      Carbon Dioxide (CO2) 25      Anion Gap 16 (*)     Urea Nitrogen 28.3 (*)     Creatinine 1.13 (*)      GFR Estimate 58 (*)     Calcium 8.7      Glucose 101 (*)    TROPONIN T, HIGH SENSITIVITY - Abnormal    Troponin T, High Sensitivity 16 (*)    BLOOD GAS VENOUS - Abnormal    pH Venous 7.38      pCO2 Venous 51 (*)     pO2 Venous 30      Bicarbonate Venous 30 (*)     Base Excess/Deficit Venous 3.6 (*)     FIO2 0.0      Oxyhemoglobin Venous 52 (*)     O2 Sat, Venous 52.9 (*)    NT PROBNP INPATIENT - Abnormal    N terminal Pro BNP Inpatient 1,302 (*)    CBC WITH PLATELETS AND DIFFERENTIAL - Abnormal    WBC Count 8.0      RBC Count 4.76      Hemoglobin 13.7      Hematocrit 43.6      MCV 92      MCH 28.8      MCHC 31.4 (*)     RDW 14.4      Platelet Count 238      % Neutrophils 64      % Lymphocytes 24      % Monocytes 8      % Eosinophils 3      % Basophils 1      % Immature Granulocytes 0      NRBCs per 100 WBC 0      Absolute Neutrophils 5.2      Absolute Lymphocytes 1.9      Absolute Monocytes 0.6      Absolute Eosinophils 0.2      Absolute Basophils 0.0      Absolute Immature Granulocytes 0.0      Absolute NRBCs 0.0     ISTAT GASES LACTATE VENOUS POCT - Abnormal    Lactic Acid POCT 0.8      Bicarbonate Venous POCT 29 (*)     O2 Sat, Venous POCT 50 (*)     pCO2 Venous POCT 50      pH Venous POCT 7.37      pO2 Venous POCT 28     MAGNESIUM - Normal    Magnesium 2.0          POC US ECHO LIMITED    (Results Pending)   XR Chest 2 Views    (Results Pending)       Treatments provided: See MAR  Family Comments:  was at bedside but has since left.   OBS brochure/video discussed/provided to patient:  No  ED Medications:   Medications   furosemide (LASIX) injection 40 mg (has no administration in time range)   azithromycin 500 mg (ZITHROMAX) in 0.9% NaCl 250 mL intermittent infusion 500 mg (has no administration in time range)   ipratropium - albuterol 0.5 mg/2.5 mg/3 mL (DUONEB) neb solution 3 mL (3 mLs Nebulization $Given 5/12/24 4249)   methylPREDNISolone sodium succinate (solu-MEDROL) injection 62.5 mg (62.5 mg  Intravenous $Given 5/12/24 1529)   furosemide (LASIX) injection 40 mg (40 mg Intravenous $Given 5/12/24 1538)       Drips infusing:  No  For the majority of the shift this patient was Green.   Interventions performed were N/A.    Sepsis treatment initiated: No    Cares/treatment/interventions/medications to be completed following ED care: Follow admission care plan.     ED Nurse Name: Radha Sanchez RN  4:42 PM     RECEIVING UNIT ED HANDOFF REVIEW    Above ED Nurse Handoff Report was reviewed: Yes  Reviewed by: Radha Chaney RN on May 12, 2024 at 5:07 PM   MALACHI Pretty called the ED to inform them the note was read: Yes

## 2024-05-12 NOTE — ED PROVIDER NOTES
Emergency Department Note      History of Present Illness     Chief Complaint  Shortness of Breath    HPI  Shilpa Miramontes is a 54 year old female on eliquis with history of atrial fibrillation, COPD, morbid obesity, CATARINO, likely obesity hypoventilation, and heart failure with preserved ejection fraction who presents with 4 days of worsening shortness of breath. She notes her neighbors were cutting their grass and the next day she began to the shortness of breath. She did not have her allergy medicine. Patient called EMS today and they gave nebulizer which improved her shortness of breath. Notes that her shortness of breath is worse when she lays down or exerts her self.  Patient also went to a graduation party this weekend and ate a significant amount of salty food.  She is concerned her heart rate feels elevated and her ankles feel more swollen. Denies chest pain, fever, chills, or cough. Patient notes that she has been in atrial fibrillation for months and takes eliquis and a Lasix. No missed doses. She is scheduled to have surgery for this. She spoke with a provider for her sleep apnea 3 days ago. No history of asthma.     Independent Historian  None    Review of External Notes  I personally reviewed the patient's pulmonary medicine note from 5/10/2024, providing information about the patient's baseline medical problems and recent clinical course    Past Medical History   Medical History and Problem List  Lung disease  tobacco use  Dyspnea, unspecified type  Diastolic heart failure   Morbid obesity (H)  Hyperlipidemia  Hypertension   Atrial fibrillation   COPD    Medications  No current outpatient medications on file.  Lasix   Cardizem     Surgical History   Past Surgical History:   Procedure Laterality Date    CHOLECYSTECTOMY  2004     Physical Exam   Patient Vitals for the past 24 hrs:   BP Temp Temp src Pulse Resp SpO2 Height Weight   05/12/24 1858 -- -- -- (!) 153 -- -- -- --   05/12/24 1803 -- -- -- 95  "-- -- -- --   05/12/24 1752 -- -- -- (!) 124 28 -- -- --   05/12/24 1745 104/67 98.4  F (36.9  C) Oral (!) 134 -- 91 % 1.549 m (5' 1\") 129.9 kg (286 lb 6.4 oz)   05/12/24 1733 109/78 -- -- (!) 151 -- -- -- --   05/12/24 1653 -- -- -- -- -- 94 % -- --   05/12/24 1648 100/73 -- -- 112 -- 97 % -- --   05/12/24 1633 -- -- -- -- -- 94 % -- --   05/12/24 1623 103/73 -- -- (!) 152 -- -- -- --   05/12/24 1616 -- -- -- (!) 125 -- -- -- --   05/12/24 1543 -- -- -- (!) 135 -- 96 % -- --   05/12/24 1533 (!) 121/110 -- -- (!) 129 -- 98 % -- --   05/12/24 1517 -- -- -- (!) 154 -- 97 % -- --   05/12/24 1513 111/82 -- -- (!) 134 -- 98 % -- --   05/12/24 1505 -- 97.9  F (36.6  C) Tympanic (!) 150 30 -- -- --   05/12/24 1504 -- -- -- -- -- 95 % -- --   05/12/24 1502 -- -- -- -- -- (!) 87 % -- --   05/12/24 1501 (!) 117/94 -- -- -- -- -- -- --     Physical Exam  Vitals and nursing note reviewed.   Constitutional:       Appearance: She is obese. She is ill-appearing. She is not diaphoretic.   Neck:      Vascular: No JVD.   Cardiovascular:      Rate and Rhythm: Tachycardia present. Rhythm irregular.      Heart sounds: No murmur heard.  Pulmonary:      Effort: Tachypnea present. No accessory muscle usage or respiratory distress.      Breath sounds: Wheezing present. No rhonchi or rales.   Abdominal:      Tenderness: There is no abdominal tenderness.   Musculoskeletal:      Right lower leg: Edema present.      Left lower leg: Edema present.   Skin:     General: Skin is warm and dry.      Coloration: Skin is not pale.   Neurological:      Mental Status: She is alert and oriented to person, place, and time.           Diagnostics   Lab Results   Labs Ordered and Resulted from Time of ED Arrival to Time of ED Departure   BASIC METABOLIC PANEL - Abnormal       Result Value    Sodium 145      Potassium 4.3      Chloride 104      Carbon Dioxide (CO2) 25      Anion Gap 16 (*)     Urea Nitrogen 28.3 (*)     Creatinine 1.13 (*)     GFR Estimate 58 " (*)     Calcium 8.7      Glucose 101 (*)    TROPONIN T, HIGH SENSITIVITY - Abnormal    Troponin T, High Sensitivity 16 (*)    BLOOD GAS VENOUS - Abnormal    pH Venous 7.38      pCO2 Venous 51 (*)     pO2 Venous 30      Bicarbonate Venous 30 (*)     Base Excess/Deficit Venous 3.6 (*)     FIO2 0.0      Oxyhemoglobin Venous 52 (*)     O2 Sat, Venous 52.9 (*)    NT PROBNP INPATIENT - Abnormal    N terminal Pro BNP Inpatient 1,302 (*)    CBC WITH PLATELETS AND DIFFERENTIAL - Abnormal    WBC Count 8.0      RBC Count 4.76      Hemoglobin 13.7      Hematocrit 43.6      MCV 92      MCH 28.8      MCHC 31.4 (*)     RDW 14.4      Platelet Count 238      % Neutrophils 64      % Lymphocytes 24      % Monocytes 8      % Eosinophils 3      % Basophils 1      % Immature Granulocytes 0      NRBCs per 100 WBC 0      Absolute Neutrophils 5.2      Absolute Lymphocytes 1.9      Absolute Monocytes 0.6      Absolute Eosinophils 0.2      Absolute Basophils 0.0      Absolute Immature Granulocytes 0.0      Absolute NRBCs 0.0     ISTAT GASES LACTATE VENOUS POCT - Abnormal    Lactic Acid POCT 0.8      Bicarbonate Venous POCT 29 (*)     O2 Sat, Venous POCT 50 (*)     pCO2 Venous POCT 50      pH Venous POCT 7.37      pO2 Venous POCT 28     MAGNESIUM - Normal    Magnesium 2.0         Imaging  XR Chest 2 Views   Final Result   IMPRESSION: Minimal change. Cardiomegaly with mild central hilar fullness. Mild diffuse interstitial prominence. No definite focal pneumonia or pleural effusion.      POC US ECHO LIMITED   Final Result     Cardiac Ultrasound      Procedure Name: POC Ultrasound Cardiac Exam      Indication: Shortness of breath      Views: Parasternal long axis view , Parasternal short axis view , Apical 4 chamber view , Subxiphoid long axis, IVC , and bilateral lungs      Findings: No pericardial effusion , Adequate left ventricular function , No right ventricular strain, and scattered B-lines, approximately 3 bilaterally, no pleural effusion,  dilated IVC      Impression: No sonographic evidence of significant cardiac tamponade , No sonographic evidence of RV size dilation , No sonographic evidence of volume depletion , and no strong evidence for pulmonary edema      Study performed by: DELFIN KERN MD       Images archived: Yes             EKG   ECG results from 05/12/24   EKG 12-lead, tracing only     Value    Systolic Blood Pressure     Diastolic Blood Pressure     Ventricular Rate 130    Atrial Rate     AK Interval     QRS Duration 76        QTc 400    P Axis     R AXIS 84    T Axis 28    Interpretation ECG      Atrial fibrillation with rapid ventricular response  Abnormal ECG  When compared with ECG of 14-FEB-2024 22:08,  Nonspecific T wave abnormality, improved in Inferior leads    My interpretation        Independent Interpretation  I independently interpreted the patient's chest x-ray, which does not show significant pulmonary edema, no infiltrate, no effusion    ED Course    Medications Administered  Medications   apixaban ANTICOAGULANT (ELIQUIS) tablet 5 mg (has no administration in time range)   atorvastatin (LIPITOR) tablet 10 mg (has no administration in time range)   cetirizine (zyrTEC) tablet 10 mg (10 mg Oral $Given 5/12/24 1842)   diltiazem ER COATED BEADS (CARDIZEM CD/CARTIA XT) 24 hr capsule 240 mg (has no administration in time range)   fluticasone (FLONASE) 50 MCG/ACT spray 1 spray (has no administration in time range)   lidocaine 1 % 0.1-1 mL (has no administration in time range)   lidocaine (LMX4) cream (has no administration in time range)   sodium chloride (PF) 0.9% PF flush 3 mL (3 mLs Intracatheter Not Given 5/12/24 1828)   sodium chloride (PF) 0.9% PF flush 3 mL (has no administration in time range)   senna-docusate (SENOKOT-S/PERICOLACE) 8.6-50 MG per tablet 1 tablet (has no administration in time range)     Or   senna-docusate (SENOKOT-S/PERICOLACE) 8.6-50 MG per tablet 2 tablet (has no administration in time  range)   Patient is already receiving anticoagulation with heparin, enoxaparin (LOVENOX), warfarin (COUMADIN)  or other anticoagulant medication (has no administration in time range)   acetaminophen (TYLENOL) tablet 650 mg (has no administration in time range)     Or   acetaminophen (TYLENOL) Suppository 650 mg (has no administration in time range)   predniSONE (DELTASONE) tablet 40 mg (has no administration in time range)   ipratropium - albuterol 0.5 mg/2.5 mg/3 mL (DUONEB) neb solution 3 mL (has no administration in time range)   furosemide (LASIX) injection 80 mg (has no administration in time range)   azithromycin (ZITHROMAX) tablet 500 mg (has no administration in time range)   metoprolol succinate ER (TOPROL XL) 24 hr tablet 25 mg (25 mg Oral $Given 5/12/24 1858)   ipratropium - albuterol 0.5 mg/2.5 mg/3 mL (DUONEB) neb solution 3 mL (3 mLs Nebulization $Given 5/12/24 1552)   methylPREDNISolone sodium succinate (solu-MEDROL) injection 62.5 mg (62.5 mg Intravenous $Given 5/12/24 1529)   furosemide (LASIX) injection 40 mg (40 mg Intravenous $Given 5/12/24 1533)   furosemide (LASIX) injection 40 mg (40 mg Intravenous $Given 5/12/24 1728)   azithromycin 500 mg (ZITHROMAX) in 0.9% NaCl 250 mL intermittent infusion 500 mg (500 mg Intravenous $New Bag 5/12/24 1727)       Procedures  Procedures     Discussion of Management  Admitting Hospitalist, Martell    Social Determinants of Health adding to complexity of care  None    ED Course  Past medical records, nursing notes, and vitals reviewed.  1510: I performed an exam of the patient and obtained history, as documented above.  Medical Decision Making / Diagnosis   CMS Diagnoses: None    MIPS     None    MDM  Shilpa Miramontes is a 54 year old female presenting with shortness of breath.    Disposition  The patient was admitted to the hospital under the care of Dr. Moura.  On arrival, vital signs are notable for tachycardia, hypoxemia, tachypnea.  Patient has some  respiratory distress.  Considered differential including CHF exacerbation, COPD exacerbation, pneumonia, among others.  EKG is notable for atrial fibrillation with RVR, which I suspect is secondary to the patient's underlying medical problems today.  Evaluation with point-of-care ultrasound and physical exam suggest mixed picture, patient does have some signs of hypervolemia but also wheezing.  Did treat with furosemide, DuoNebs, steroids and azithromycin.  The patient did have improvement in his symptoms after DuoNeb's.  She was placed on supplemental oxygen for hypoxemia.  There is no leukocytosis or infiltrate on chest x-ray to suggest pneumonia.  I did obtain a troponin but of low suspicion for acute coronary syndrome and this was not significantly elevated.  Patient was admitted to hospitalist for further evaluation and management.    ICD-10 Codes:    ICD-10-CM    1. Acute on chronic respiratory failure with hypoxemia (H)  J96.21       2. Atrial fibrillation with RVR (H)  I48.91       3. Chronic obstructive pulmonary disease, unspecified COPD type (H)  J44.9              Scribe Disclosure:  I, Vish Noble, am serving as a scribe at 3:19 PM on 5/12/2024 to document services personally performed by Diaz Hill MD based on my observations and the provider's statements to me.     [employer]       Diaz Hill MD  05/12/24 1934

## 2024-05-12 NOTE — ED TRIAGE NOTES
Pt is complaining of shortness of breath that started this past Thursday and has gotten progressively worse. Pt went to  today and was then BIBA due to shortness of breath. Pt does have a history of CHF, AFIB. Pt did receive a duo neb from

## 2024-05-12 NOTE — PLAN OF CARE
"Goal Outcome Evaluation:    Provided cares for pt from [id number]. Pt a/o x4. Denied pain. Dyspnea on exertion, on 3L nasal canula. SBA in room. Voided. Tele a.fib RVR. HR elevated, MD aware. Treating with lasix and steroids. Productive cough at times. Monitor breathing, HR, and O2 overnight.     /67 (BP Location: Left arm, Patient Position: Sitting, Cuff Size: Adult Large)   Pulse 95   Temp 98.4  F (36.9  C) (Oral)   Resp 28   Ht 1.549 m (5' 1\")   Wt 129.9 kg (286 lb 6.4 oz)   LMP 12/14/2020   SpO2 91%   BMI 54.11 kg/m           Plan of Care Reviewed With: patient    Overall Patient Progress: improvingOverall Patient Progress: improving    Outcome Evaluation: 3L nasal canula, HR elevated 120-170s, on IV lasix      Problem: Adult Inpatient Plan of Care  Goal: Plan of Care Review  Description: The Plan of Care Review/Shift note should be completed every shift.  The Outcome Evaluation is a brief statement about your assessment that the patient is improving, declining, or no change.  This information will be displayed automatically on your shift  note.  Outcome: Progressing  Flowsheets (Taken 5/12/2024 1846)  Outcome Evaluation: 3L nasal canula, HR elevated 120-170s, on IV lasix  Plan of Care Reviewed With: patient  Overall Patient Progress: improving  Goal: Patient-Specific Goal (Individualized)  Description: You can add care plan individualizations to a care plan. Examples of Individualization might be:  \"Parent requests to be called daily at 9am for status\", \"I have a hard time hearing out of my right ear\", or \"Do not touch me to wake me up as it startles  me\".  Outcome: Progressing  Goal: Absence of Hospital-Acquired Illness or Injury  Outcome: Progressing  Intervention: Identify and Manage Fall Risk  Recent Flowsheet Documentation  Taken 5/12/2024 1810 by Radha Chaney RN  Safety Promotion/Fall Prevention:   activity supervised   assistive device/personal items within reach   clutter free " environment maintained   increased rounding and observation   room door open   room near nurse's station   room organization consistent   safety round/check completed   supervised activity  Intervention: Prevent and Manage VTE (Venous Thromboembolism) Risk  Recent Flowsheet Documentation  Taken 5/12/2024 1810 by Radha Chaney RN  VTE Prevention/Management: SCDs (sequential compression devices) off  Intervention: Prevent Infection  Recent Flowsheet Documentation  Taken 5/12/2024 1810 by Radha Chaney RN  Infection Prevention:   equipment surfaces disinfected   hand hygiene promoted   personal protective equipment utilized   rest/sleep promoted   single patient room provided  Goal: Optimal Comfort and Wellbeing  Outcome: Progressing  Goal: Readiness for Transition of Care  Outcome: Progressing  Intervention: Mutually Develop Transition Plan  Recent Flowsheet Documentation  Taken 5/12/2024 1757 by Radha Chaney RN  Equipment Currently Used at Home: none     Problem: Comorbidity Management  Goal: Maintenance of Heart Failure Symptom Control  Outcome: Progressing  Goal: Blood Pressure in Desired Range  Outcome: Progressing     Problem: Fall Injury Risk  Goal: Absence of Fall and Fall-Related Injury  Outcome: Progressing  Intervention: Promote Injury-Free Environment  Recent Flowsheet Documentation  Taken 5/12/2024 1810 by Radha Chaney RN  Safety Promotion/Fall Prevention:   activity supervised   assistive device/personal items within reach   clutter free environment maintained   increased rounding and observation   room door open   room near nurse's station   room organization consistent   safety round/check completed   supervised activity

## 2024-05-12 NOTE — PROVIDER NOTIFICATION
Sukhwinder messaged Dr. Moura the pt HR is from 120s up to 170s right now at rest. Asked if she wanted any medications given right away?     MD responded that she moved metoprolol to now.     RN asked MD if she wanted a range on the metoprolol for BP because the last BP was 104/67.     MD responded to hold if SBP <90.

## 2024-05-12 NOTE — H&P
[facility]    History and Physical - Hospitalist Service       Date of Admission:  5/12/2024    Assessment & Plan      Shilpa Miramontes is a 54 year old female with PMH of HFpEF, mild/moderate aortic stenosis, OHS, CATARINO, Afib, possible COPD, HLD, pre-DM, who was admitted on 5/12/2024 with acute hypoxic respiratory failure, likely multifactorial etiology - COPD and HFpEF.     Dyspnea  Acute hypoxic respiratory failure  Possible COPD  Acute decompensated heart failure  Presenting with 4 days of worsening dyspnea and worsening productive cough. Endorses dietary indiscretion, missed medications, and worsening allergies.  Hypoxic requiring 2L, RR 30s, tachycardic 130-150s, otherwise afebrile and normotensive on arrival. Exam notable for wheezing on exam with mild LE edema. Workup notable for VBG 7.38/51,  BNP 1,302, troponin 16, lactic acid 0.8, Cr 1.13, CBC in normal limits, EKG with Afib with RVR but no ischemic changes. CXR similar to previous, no consolidation. BSUS per ER provider with a few B lines and plump IVC. Dyspnea/wheezing improved after duoneb. Suspect multifactorial etiology - predominantly COPD exacerbation with mild decompensated HFpEF.   - Prednisone 40mg   - Duonebs qid  - Azithromycin 500mg PO for total of 3 days  - Lasix 80 IV BID    Afib with RVR  DESIRE thrombus  At most recent cardiology visit on 4/24, ADAMARIS/DCCV was recommended as she was having RVR with bradycardia and significant pauses at night. Appears it was planned for 5/24.   Suspect current RVR driven by above.   - Cardiology consult  - PTA Diltiazem, Metoprolol, and Apixaban     FLAVIA  Cr 1.13 on admission from baseline 0.8. Likely 2/2 above.  - Avoid nephrotoxins  - AM BMP    OHS, CATARINO - had telemedicine visit with pulm 5/10, needs polysomnogram    Seasonal allergies - PTA cetirizine and flonase    HLD - PTA atorvastatin  PreDM -  on admission, monitor with daily labs        Diet: Combination Diet 2 gm NA Diet; No  "Caffeine Diet (and additional linked orders)  Fluid restriction 1800 ML FLUID (and additional linked orders)  DVT Prophylaxis: DOAC  Xiao Catheter: Not present  Lines: None     Cardiac Monitoring: ACTIVE order. Indication: Acute decompensated heart failure (48 hours)  Code Status: Full Code    Clinically Significant Risk Factors Present on Admission               # Drug Induced Coagulation Defect: home medication list includes an anticoagulant medication    # Hypertension: Noted on problem list      # Severe Obesity: Estimated body mass index is 54.11 kg/m  as calculated from the following:    Height as of this encounter: 1.549 m (5' 1\").    Weight as of this encounter: 129.9 kg (286 lb 6.4 oz).              Disposition Plan     Medically Ready for Discharge: Anticipated in 2-4 Days           Tena Moura DO  Hospitalist Service  [facility]  Securely message with [employer] (more info)  Text page via [facility] Paging/Directory     ______________________________________________________________________    Chief Complaint   dyspnea    History is obtained from the patient    History of Present Illness   Shilpa Miramontes is a 54 year old female who presents with dyspnea. Started after she missed her allergy medications and the pollen and grass starting exacerbating her cough/congestion. She went to [facility] for her grandson's graduation, where she ate salty food and drank more water. She was using her inhaler more and unfortunately ran out. Became more dyspneic and developed wheezing. Also unable to sleep flat 2/2 worsening cough.     Past Medical History    No past medical history on file.    Past Surgical History   Past Surgical History:   Procedure Laterality Date    CHOLECYSTECTOMY  2004       Prior to Admission Medications   Prior to Admission Medications   Prescriptions Last Dose Informant Patient Reported? Taking?   albuterol (VENTOLIN HFA) 108 (90 Base) MCG/ACT inhaler Unknown at PRN  No " Yes   Sig: Inhale 2 puffs into the lungs every 6 hours as needed for shortness of breath, wheezing or cough   apixaban ANTICOAGULANT (ELIQUIS) 5 MG tablet 5/12/2024 at X1  No Yes   Sig: Take 1 tablet (5 mg) by mouth 2 times daily   atorvastatin (LIPITOR) 10 MG tablet 5/11/2024 at HS  No Yes   Sig: Take 1 tablet (10 mg) by mouth every evening   cetirizine (ZYRTEC) 10 MG tablet 5/11/2024 at AM  No Yes   Sig: Take 1 tablet (10 mg) by mouth daily   diltiazem ER COATED BEADS (CARDIZEM CD/CARTIA XT) 240 MG 24 hr capsule 5/12/2024 at AM  No Yes   Sig: Take 1 capsule (240 mg) by mouth daily   fluticasone (FLONASE) 50 MCG/ACT nasal spray Unknown at PRN  Yes Yes   Sig: Spray 1 spray into both nostrils daily as needed for rhinitis or allergies   furosemide (LASIX) 40 MG tablet 5/12/2024 at AM  No Yes   Sig: Take 1 tablet (40 mg) by mouth daily   metoprolol succinate ER (TOPROL XL) 25 MG 24 hr tablet 5/11/2024 at HS  No Yes   Sig: Take 1 tablet (25 mg) by mouth every evening      Facility-Administered Medications: None           Physical Exam   Vital Signs: Temp: 98.4  F (36.9  C) Temp src: Oral BP: 104/67 Pulse: (!) 124   Resp: 28 SpO2: 91 % O2 Device: Nasal cannula Oxygen Delivery: 2 LPM  Weight: 286 lbs 6.4 oz    Constitutional: Awake, alert, no distress, and cooperative  Cardiovascular: tachycardic and irregular rhythm, normal S1 and S2, no S3 or S4, and no murmur noted, 1+ LE edema  Respiratory: No increased work of breathing, poor air exchange, wheezing, no crackles  Gastrointestinal: Abdomen soft, non-tender, non-distended. BS normal. No masses, organomegaly     Medical Decision Making       65 MINUTES SPENT BY ME on the date of service doing chart review, history, exam, documentation & further activities per the note.      Data     I have personally reviewed the following data over the past 24 hrs:    8.0  \   13.7   / 238     145 104 28.3 (H) /  101 (H)   4.3 25 1.13 (H) \     Trop: 16 (H) BNP: 1,302 (H)     Procal:  N/A CRP: N/A Lactic Acid: 0.8

## 2024-05-13 LAB
ANION GAP SERPL CALCULATED.3IONS-SCNC: 14 MMOL/L (ref 7–15)
ATRIAL RATE - MUSE: NORMAL BPM
BUN SERPL-MCNC: 33.8 MG/DL (ref 6–20)
CALCIUM SERPL-MCNC: 8.4 MG/DL (ref 8.6–10)
CHLORIDE SERPL-SCNC: 106 MMOL/L (ref 98–107)
CREAT SERPL-MCNC: 0.94 MG/DL (ref 0.51–0.95)
DEPRECATED HCO3 PLAS-SCNC: 22 MMOL/L (ref 22–29)
DIASTOLIC BLOOD PRESSURE - MUSE: NORMAL MMHG
EGFRCR SERPLBLD CKD-EPI 2021: 72 ML/MIN/1.73M2
ERYTHROCYTE [DISTWIDTH] IN BLOOD BY AUTOMATED COUNT: 14.2 % (ref 10–15)
GLUCOSE SERPL-MCNC: 162 MG/DL (ref 70–99)
HCT VFR BLD AUTO: 41.1 % (ref 35–47)
HGB BLD-MCNC: 12.3 G/DL (ref 11.7–15.7)
INTERPRETATION ECG - MUSE: NORMAL
MCH RBC QN AUTO: 28.6 PG (ref 26.5–33)
MCHC RBC AUTO-ENTMCNC: 29.9 G/DL (ref 31.5–36.5)
MCV RBC AUTO: 96 FL (ref 78–100)
P AXIS - MUSE: NORMAL DEGREES
PLATELET # BLD AUTO: 210 10E3/UL (ref 150–450)
POTASSIUM SERPL-SCNC: 4.9 MMOL/L (ref 3.4–5.3)
PR INTERVAL - MUSE: NORMAL MS
QRS DURATION - MUSE: 76 MS
QT - MUSE: 272 MS
QTC - MUSE: 400 MS
R AXIS - MUSE: 84 DEGREES
RBC # BLD AUTO: 4.3 10E6/UL (ref 3.8–5.2)
SODIUM SERPL-SCNC: 142 MMOL/L (ref 135–145)
SYSTOLIC BLOOD PRESSURE - MUSE: NORMAL MMHG
T AXIS - MUSE: 28 DEGREES
VENTRICULAR RATE- MUSE: 130 BPM
WBC # BLD AUTO: 8.5 10E3/UL (ref 4–11)

## 2024-05-13 PROCEDURE — 80048 BASIC METABOLIC PNL TOTAL CA: CPT | Performed by: STUDENT IN AN ORGANIZED HEALTH CARE EDUCATION/TRAINING PROGRAM

## 2024-05-13 PROCEDURE — 85027 COMPLETE CBC AUTOMATED: CPT | Performed by: STUDENT IN AN ORGANIZED HEALTH CARE EDUCATION/TRAINING PROGRAM

## 2024-05-13 PROCEDURE — 999N000156 HC STATISTIC RCP CONSULT EA 30 MIN

## 2024-05-13 PROCEDURE — 250N000012 HC RX MED GY IP 250 OP 636 PS 637: Performed by: STUDENT IN AN ORGANIZED HEALTH CARE EDUCATION/TRAINING PROGRAM

## 2024-05-13 PROCEDURE — 250N000013 HC RX MED GY IP 250 OP 250 PS 637: Performed by: HOSPITALIST

## 2024-05-13 PROCEDURE — 99233 SBSQ HOSP IP/OBS HIGH 50: CPT | Performed by: HOSPITALIST

## 2024-05-13 PROCEDURE — 250N000009 HC RX 250: Performed by: HOSPITALIST

## 2024-05-13 PROCEDURE — 250N000011 HC RX IP 250 OP 636: Mod: JZ | Performed by: INTERNAL MEDICINE

## 2024-05-13 PROCEDURE — 250N000013 HC RX MED GY IP 250 OP 250 PS 637: Performed by: STUDENT IN AN ORGANIZED HEALTH CARE EDUCATION/TRAINING PROGRAM

## 2024-05-13 PROCEDURE — 36415 COLL VENOUS BLD VENIPUNCTURE: CPT | Performed by: STUDENT IN AN ORGANIZED HEALTH CARE EDUCATION/TRAINING PROGRAM

## 2024-05-13 PROCEDURE — 250N000013 HC RX MED GY IP 250 OP 250 PS 637: Performed by: INTERNAL MEDICINE

## 2024-05-13 PROCEDURE — 99222 1ST HOSP IP/OBS MODERATE 55: CPT | Performed by: INTERNAL MEDICINE

## 2024-05-13 PROCEDURE — 120N000001 HC R&B MED SURG/OB

## 2024-05-13 RX ORDER — POTASSIUM CHLORIDE 1500 MG/1
40 TABLET, EXTENDED RELEASE ORAL
Status: CANCELLED | OUTPATIENT
Start: 2024-05-13

## 2024-05-13 RX ORDER — DILTIAZEM HYDROCHLORIDE 30 MG/1
30 TABLET, FILM COATED ORAL EVERY 6 HOURS SCHEDULED
Status: DISCONTINUED | OUTPATIENT
Start: 2024-05-14 | End: 2024-05-15

## 2024-05-13 RX ORDER — MAGNESIUM SULFATE HEPTAHYDRATE 40 MG/ML
2 INJECTION, SOLUTION INTRAVENOUS
Status: CANCELLED | OUTPATIENT
Start: 2024-05-13

## 2024-05-13 RX ORDER — FUROSEMIDE 10 MG/ML
40 INJECTION INTRAMUSCULAR; INTRAVENOUS
Status: DISCONTINUED | OUTPATIENT
Start: 2024-05-13 | End: 2024-05-14

## 2024-05-13 RX ORDER — LEVALBUTEROL INHALATION SOLUTION 0.63 MG/3ML
0.63 SOLUTION RESPIRATORY (INHALATION) EVERY 4 HOURS PRN
Status: DISCONTINUED | OUTPATIENT
Start: 2024-05-13 | End: 2024-05-15 | Stop reason: HOSPADM

## 2024-05-13 RX ORDER — PREDNISONE 20 MG/1
40 TABLET ORAL DAILY
Status: DISCONTINUED | OUTPATIENT
Start: 2024-05-14 | End: 2024-05-15 | Stop reason: HOSPADM

## 2024-05-13 RX ORDER — POTASSIUM CHLORIDE 1500 MG/1
20 TABLET, EXTENDED RELEASE ORAL
Status: CANCELLED | OUTPATIENT
Start: 2024-05-13

## 2024-05-13 RX ORDER — DIGOXIN 0.25 MG/ML
250 INJECTION INTRAMUSCULAR; INTRAVENOUS ONCE
Status: COMPLETED | OUTPATIENT
Start: 2024-05-14 | End: 2024-05-13

## 2024-05-13 RX ORDER — DILTIAZEM HYDROCHLORIDE 30 MG/1
60 TABLET, FILM COATED ORAL EVERY 6 HOURS SCHEDULED
Status: DISCONTINUED | OUTPATIENT
Start: 2024-05-13 | End: 2024-05-13

## 2024-05-13 RX ORDER — IPRATROPIUM BROMIDE AND ALBUTEROL SULFATE 2.5; .5 MG/3ML; MG/3ML
3 SOLUTION RESPIRATORY (INHALATION) EVERY 4 HOURS PRN
Status: DISCONTINUED | OUTPATIENT
Start: 2024-05-13 | End: 2024-05-13

## 2024-05-13 RX ADMIN — DIGOXIN 250 MCG: 0.25 INJECTION INTRAMUSCULAR; INTRAVENOUS at 23:44

## 2024-05-13 RX ADMIN — APIXABAN 5 MG: 5 TABLET, FILM COATED ORAL at 09:03

## 2024-05-13 RX ADMIN — AZITHROMYCIN DIHYDRATE 500 MG: 250 TABLET ORAL at 09:03

## 2024-05-13 RX ADMIN — METOPROLOL SUCCINATE 25 MG: 25 TABLET, FILM COATED, EXTENDED RELEASE ORAL at 20:57

## 2024-05-13 RX ADMIN — ATORVASTATIN CALCIUM 10 MG: 10 TABLET, FILM COATED ORAL at 20:57

## 2024-05-13 RX ADMIN — PREDNISONE 40 MG: 20 TABLET ORAL at 09:03

## 2024-05-13 RX ADMIN — CETIRIZINE HYDROCHLORIDE 10 MG: 10 TABLET, FILM COATED ORAL at 09:03

## 2024-05-13 RX ADMIN — DILTIAZEM HYDROCHLORIDE 60 MG: 30 TABLET, FILM COATED ORAL at 14:10

## 2024-05-13 RX ADMIN — DILTIAZEM HYDROCHLORIDE 30 MG: 30 TABLET, FILM COATED ORAL at 23:42

## 2024-05-13 RX ADMIN — APIXABAN 5 MG: 5 TABLET, FILM COATED ORAL at 20:57

## 2024-05-13 RX ADMIN — LEVALBUTEROL HYDROCHLORIDE 0.63 MG: 0.63 SOLUTION RESPIRATORY (INHALATION) at 15:38

## 2024-05-13 ASSESSMENT — ACTIVITIES OF DAILY LIVING (ADL)
ADLS_ACUITY_SCORE: 33

## 2024-05-13 NOTE — PLAN OF CARE
"BP 95/73 (BP Location: Left arm)   Pulse (!) 121   Temp 97.9  F (36.6  C) (Oral)   Resp 20   Ht 1.549 m (5' 1\")   Wt 129.9 kg (286 lb 6.4 oz)   LMP 12/14/2020   SpO2 91%   BMI 54.11 kg/m      Neuro: a/o x4  Cardiac: tele- afib rvr  Lungs: diminished, 1L NC  GI: WNL  : WNL  Pain: denies  IV: saline locked  Meds: lasix, eliquis, metroprolol, cardizem   Diet: cardiac, 1800 fluid restriction. NPO at midnight   Activity: SBA  Misc: edema LEs.   Plan: ADAMARIS tomorrow. Currently resting in bed, able to make need known.       Problem: Adult Inpatient Plan of Care  Goal: Plan of Care Review  Description: The Plan of Care Review/Shift note should be completed every shift.  The Outcome Evaluation is a brief statement about your assessment that the patient is improving, declining, or no change.  This information will be displayed automatically on your shift  note.  5/13/2024 1701 by Vidhya Wang RN  Outcome: Progressing  Flowsheets (Taken 5/13/2024 1701)  Outcome Evaluation: ADAMARIS scheduled tomorrow. 1L NC satting in 90s. tele- Afib  Plan of Care Reviewed With: patient  Overall Patient Progress: improving  5/13/2024 1658 by Vidhya Wang RN  Outcome: Progressing  Flowsheets (Taken 5/13/2024 1658)  Outcome Evaluation: ADAMARIS scheulded for tomorrow. 1L NC satting in 90s. Tele- Afib rvr  Plan of Care Reviewed With: patient  Overall Patient Progress: improving  Goal: Patient-Specific Goal (Individualized)  Description: You can add care plan individualizations to a care plan. Examples of Individualization might be:  \"Parent requests to be called daily at 9am for status\", \"I have a hard time hearing out of my right ear\", or \"Do not touch me to wake me up as it startles  me\".  5/13/2024 1701 by Vidhya Wang, RN  Outcome: Progressing  5/13/2024 1658 by Vidhya Wang RN  Outcome: Progressing  Goal: Absence of Hospital-Acquired Illness or Injury  5/13/2024 1701 by Vidhya Wang RN  Outcome: Progressing  5/13/2024 1658 by " Kathleen, Vidhya M, RN  Outcome: Progressing  Intervention: Identify and Manage Fall Risk  Recent Flowsheet Documentation  Taken 5/13/2024 0905 by Vidhya Wang RN  Safety Promotion/Fall Prevention:   activity supervised   assistive device/personal items within reach   clutter free environment maintained   nonskid shoes/slippers when out of bed   safety round/check completed  Intervention: Prevent Skin Injury  Recent Flowsheet Documentation  Taken 5/13/2024 0905 by Vidhya Wang RN  Body Position: position changed independently  Intervention: Prevent and Manage VTE (Venous Thromboembolism) Risk  Recent Flowsheet Documentation  Taken 5/13/2024 0905 by Vidhya Wang RN  VTE Prevention/Management: SCDs (sequential compression devices) off  Intervention: Prevent Infection  Recent Flowsheet Documentation  Taken 5/13/2024 0905 by Vidhya Wang RN  Infection Prevention:   rest/sleep promoted   single patient room provided  Goal: Optimal Comfort and Wellbeing  5/13/2024 1701 by Vidhya Wang RN  Outcome: Progressing  5/13/2024 1658 by Vidhya Wang RN  Outcome: Progressing  Goal: Readiness for Transition of Care  5/13/2024 1701 by Vidhya Wang RN  Outcome: Progressing  5/13/2024 1658 by Vidhya Wang RN  Outcome: Progressing     Problem: Comorbidity Management  Goal: Maintenance of Heart Failure Symptom Control  5/13/2024 1701 by Vidhya Wang RN  Outcome: Progressing  5/13/2024 1658 by Vidhya Wang RN  Outcome: Progressing  Intervention: Maintain Heart Failure Management  Recent Flowsheet Documentation  Taken 5/13/2024 0905 by Vidhya Wang RN  Medication Review/Management: medications reviewed  Goal: Blood Pressure in Desired Range  5/13/2024 1701 by Vidhya Wang RN  Outcome: Progressing  5/13/2024 1658 by Vidhya Wang RN  Outcome: Progressing  Intervention: Maintain Blood Pressure Management  Recent Flowsheet Documentation  Taken 5/13/2024 0905 by Vidhya Wang RN  Medication  Review/Management: medications reviewed     Problem: Fall Injury Risk  Goal: Absence of Fall and Fall-Related Injury  5/13/2024 1701 by Vidhya Wang RN  Outcome: Progressing  5/13/2024 1658 by Vidhya Wang RN  Outcome: Progressing  Intervention: Identify and Manage Contributors  Recent Flowsheet Documentation  Taken 5/13/2024 0905 by Vidhya Wang, RN  Medication Review/Management: medications reviewed  Intervention: Promote Injury-Free Environment  Recent Flowsheet Documentation  Taken 5/13/2024 0905 by Vidhya Wang, RN  Safety Promotion/Fall Prevention:   activity supervised   assistive device/personal items within reach   clutter free environment maintained   nonskid shoes/slippers when out of bed   safety round/check completed       Goal Outcome Evaluation:      Plan of Care Reviewed With: patient    Overall Patient Progress: improvingOverall Patient Progress: improving    Outcome Evaluation: ADAMARIS scheduled tomorrow. 1L NC satting in 90s. tele- Afib

## 2024-05-13 NOTE — PROGRESS NOTES
[facility]    Hospitalist Progress Note      Assessment & Plan   Shilpa Miramontes is a 54 year old female with PMH of HFpEF, mild/moderate aortic stenosis, OHS, CATARINO, Afib, possible COPD, HLD, pre-DM, who was admitted on 5/12/2024 with SOB and cough.      #SPRING and Acute on chronic hypoxemic respiratory failure secondary to acute on chronic HFpEF. Afib w RVR.  Less likely COPD exaceberation: Presenting with 4 days of worsening dyspnea and worsening productive cough. Endorses dietary indiscretion, missed medications, and worsening allergies.  No clear chest pain.  Weight appears to be stable compared to her discharge weight in February.  In the ER, patient afebrile but tachycardic in A-fib with RVR with rates in the 130-150s.  Needing upwards of 5 L overnight to maintain saturations.  -VBG unremarkable.  BNP not significantly elevated.  Lactic acid within normal limits.  High-sensitivity troponin not significantly elevated at 16.  EKG with A-fib with RVR without clear ischemic changes.  Chest x-ray appears similar to previous with no consolidation and possible mild volume overload.    -It looks like patient was discharged on 3 L of oxygen with activity but none at rest.  She also uses oxygen at night while awaiting her CPAP study.  -Patient was initially started on prednisone, DuoNebs, azithromycin along with IV Lasix.  -Suspect patient's A-fib with RVR is the main  here.  Patient was seen by cardiology on 4/24 and ADAMARIS with cardioversion was recommended if she was having RVR with bradycardia and significant pauses at night.  This was planned for 5/24.  -Patient still with heart rates in the 130s range.  She is on metoprolol and diltiazem which we will continue with hold parameters though convert long acting diltiazem to short acting while IP.    -Cardiology is consulted to help with management.  She will continue on her apixaban.  -Reduced Lasix to 40 mg twice daily.  Her weight is similar  to February so I do not think she is significantly volume overloaded but with her body habitus it is difficult to delineate.  -Switched DuoNebs as needed to Xopenex given A-fib with RVR.  Short course of prednisone and azithro though do not suspect this is primary  of her presentation.      #DESIRE thrombus: DOAC therapy.      #Mild FLAVIA: Cr 1.13 on admission from baseline 0.8.  Improved  #OHS, CATARINO - had telemedicine visit with pulm 5/10, needs polysomnogram.  Only using O2 at 3 L for now.   #Seasonal allergies - PTA cetirizine and flonase   #HLD - PTA atorvastatin    Dispo: Likely a couple days. Pending cards eval.   DVT Prophylaxis: DOAC  Code Status: Full Code    Serjio Steinberg MD    Interval History   Assumed care.  Patient notes she is feeling a bit better.  Needing 5 L of oxygen overnight.  Heart rates elevated in the 130-150 range.  She tells me she is still awaiting her outpatient sleep study to get CPAP.  She uses oxygen when she is active and at night.  She denies current chest pain.  No fevers or chills.    -Data reviewed today: I reviewed all new labs and imaging results over the last 24 hours. I personally reviewed     Physical Exam   Temp: 98  F (36.7  C) Temp src: Oral BP: 105/65 Pulse: (!) 130   Resp: 18 SpO2: 92 % O2 Device: Nasal cannula Oxygen Delivery: 3 LPM  Vitals:    05/12/24 1745   Weight: 129.9 kg (286 lb 6.4 oz)     Vital Signs with Ranges  Temp:  [97.9  F (36.6  C)-98.4  F (36.9  C)] 98  F (36.7  C)  Pulse:  [] 130  Resp:  [16-30] 18  BP: ()/() 105/65  SpO2:  [87 %-98 %] 92 %  I/O last 3 completed shifts:  In: 960 [P.O.:960]  Out: 700 [Urine:700]    Constitutional: Nontoxic, NAD.  Obese  HEENT: Normocephalic. MMM, cannot appreciate JVD secondary to body habitus  Respiratory: Nl WOB, no wheezing noted.  She has some diminished breath sounds at the bases.  Cardiovascular: Tachycardic, irregular.  GI: Obese, BS+, NT, ND  Skin/Integumen: WWP, no rash.  Moderate bilateral  edema  Neuro: CNII-XII intact. Moves all extremities. No tremor. A&Ox3.    Medications   Current Facility-Administered Medications   Medication Dose Route Frequency Provider Last Rate Last Admin    Patient is already receiving anticoagulation with heparin, enoxaparin (LOVENOX), warfarin (COUMADIN)  or other anticoagulant medication   Does not apply Continuous PRN Tena Moura, DO         Current Facility-Administered Medications   Medication Dose Route Frequency Provider Last Rate Last Admin    apixaban ANTICOAGULANT (ELIQUIS) tablet 5 mg  5 mg Oral BID Kendal Mouraine, DO   5 mg at 05/13/24 0903    atorvastatin (LIPITOR) tablet 10 mg  10 mg Oral QPM Tena Moura, DO   10 mg at 05/12/24 2043    azithromycin (ZITHROMAX) tablet 500 mg  500 mg Oral Daily Tena Moura, DO   500 mg at 05/13/24 0903    cetirizine (zyrTEC) tablet 10 mg  10 mg Oral Daily Martell, Tena, DO   10 mg at 05/13/24 0903    diltiazem ER COATED BEADS (CARDIZEM CD/CARTIA XT) 24 hr capsule 240 mg  240 mg Oral Daily Serjio Steinberg MD        furosemide (LASIX) injection 40 mg  40 mg Intravenous BID Serjio Steinberg MD        metoprolol succinate ER (TOPROL XL) 24 hr tablet 25 mg  25 mg Oral QPM Serjio Steinberg MD   25 mg at 05/12/24 1858    predniSONE (DELTASONE) tablet 40 mg  40 mg Oral Daily Tena Moura, DO   40 mg at 05/13/24 0903    sodium chloride (PF) 0.9% PF flush 3 mL  3 mL Intracatheter Q8H Tena Moura, DO   3 mL at 05/13/24 0907       Data   Recent Labs   Lab 05/13/24  0748 05/12/24  1513   WBC 8.5 8.0   HGB 12.3 13.7   MCV 96 92    238    145   POTASSIUM 4.9 4.3   CHLORIDE 106 104   CO2 22 25   BUN 33.8* 28.3*   CR 0.94 1.13*   ANIONGAP 14 16*   KRISTA 8.4* 8.7   * 101*       Recent Results (from the past 24 hour(s))   POC US ECHO LIMITED    Impression      Cardiac Ultrasound    Procedure Name: POC Ultrasound Cardiac Exam    Indication: Shortness of breath    Views: Parasternal  long axis view , Parasternal short axis view , Apical 4 chamber view , Subxiphoid long axis, IVC , and bilateral lungs    Findings: No pericardial effusion , Adequate left ventricular function , No right ventricular strain, and scattered B-lines, approximately 3 bilaterally, no pleural effusion, dilated IVC    Impression: No sonographic evidence of significant cardiac tamponade , No sonographic evidence of RV size dilation , No sonographic evidence of volume depletion , and no strong evidence for pulmonary edema    Study performed by: DELFIN KERN MD     Images archived: Yes     XR Chest 2 Views    Narrative    EXAM: XR CHEST 2 VIEWS  LOCATION: [facility]  DATE: 5/12/2024    INDICATION: sob  COMPARISON: 2/14/2024      Impression    IMPRESSION: Minimal change. Cardiomegaly with mild central hilar fullness. Mild diffuse interstitial prominence. No definite focal pneumonia or pleural effusion.     '

## 2024-05-13 NOTE — UTILIZATION REVIEW
"  Admission Status; Secondary Review Determination         Under the authority of the Utilization Management Committee, the utilization review process indicated a secondary review on the above patient.  The review outcome is based on review of the medical records, discussions with staff, and applying clinical experience noted on the date of the review.        (xxx)      Inpatient Status Appropriate - This patient's medical care is consistent with medical management for inpatient care and reasonable inpatient medical practice.      () Observation Status Appropriate - This patient does not meet hospital inpatient criteria and is placed in observation status. If this patient's primary payer is Medicare and was admitted as an inpatient, Condition Code 44 should be used and patient status changed to \"observation\".   () Admission Status NOT Appropriate - This patient's medical care is not consistent with medical management for Inpatient or Observation Status.          RATIONALE FOR DETERMINATION     Shilpa Miramontes is a 54 year old female with PMH of bradycardia with significant pauses, know LLL thrombus, HFpEF, mild/moderate aortic stenosis, OHS, CATARINO, Afib, possible COPD, HLD, and pre-DM, who was admitted on 5/12/2024 with 4 days of worsening dyspnea and worsening productive cough. Endorses dietary indiscretion, missed medications, and worsening allergies.  Hypoxic requiring 2L, RR 30s, tachycardic 130-150s. Exam notable for wheezing on exam with mild LE edema. Workup notable for VBG 7.38/51,  BNP 1,302, troponin 16, lactic acid 0.8, Cr 1.13, EKG with Afib with RVR but no ischemic changes. CXR similar to previous, no consolidation. BSUS per ER provider with a few B lines and plump IVC. Dyspnea/wheezing improved after duoneb. Suspect multifactorial etiology - predominantly COPD exacerbation with mild decompensated HFpEF.  She was admitted for close clinical monitoring, IV diuretics, supplemental oxygen (3-5 LPM), " cardiology consultation, and further evaluation.    The severity of illness, intensity of service provided, expected LOS and risk for adverse outcome make the care complex, high risk and appropriate for hospital admission.        The information on this document is developed by the utilization review team in order for the business office to ensure compliance.  This only denotes the appropriateness of proper admission status and does not reflect the quality of care rendered.         The definitions of Inpatient Status and Observation Status used in making the determination above are those provided in the CMS Coverage Manual, Chapter 1 and Chapter 6, section 70.4.      Sincerely,     Polina Castellano MD  Physician Advisor   Utilization Review/ Case Management  [facility].

## 2024-05-13 NOTE — PROVIDER NOTIFICATION
"   05/12/24 7767   RCAT Assessment   Reason for Assessment CHF   Pulmonary Status 1   Surgical Status 0   Chest X-ray 1   Respiratory Pattern 0   Mental Status 0   Breath Sounds 2   Cough Effectiveness 0   Level of Activity 1   O2 Required for SpO2>=92% 1   Acuity Level (points) 6   Acuity Level  4   Re-eval Interval Guideline Every 3 days   Re-evaluation Date 05/15/24   Clinical Indications/Symptoms   Aerosol Therapy RCAT protocol   Volume Expansion Prevent atelectasis   Aerosol Therapy Plan   RT Treatment Nebulizer   Anticholinergic/Beta-Andrenergic Agonist Duoneb soln (0.5mg/3mg per 3mL) neb Max 6 doses/24h   Aerosol Treatment Frequency Acuity Level 4: PRN [id number] wheezing   Volume Expansion Plan   Volume Expansion Treatment Incentive Spirometer   Volume Expansion Frequency Acuity Level 4: BID-Prevention of atelectasis     BS decreased with no increased WOB noted, able to speak in complete sentences, no accessory muscle use or nasal flaring.  Vitals: /67 (BP Location: Left arm, Patient Position: Sitting, Cuff Size: Adult Large)   Pulse 118   Temp 98.4  F (36.9  C) (Oral)   Resp 16   Ht 1.549 m (5' 1\")   Wt 129.9 kg (286 lb 6.4 oz)   LMP 12/14/2020   SpO2 90%   BMI 54.11 kg/m    BMI= Body mass index is 54.11 kg/m .   "

## 2024-05-13 NOTE — PLAN OF CARE
"Care from 1900-0730  Inpatient Progress Note - MS3  For vital signs and complete assessments, please see documentation flowsheets.     ORIENTATION: Aox4.  VSS; soft Bp; elevated HR  PAIN: Denies pain, CP, some SOB after ambulating, denies n/v/d.  O2: 5L NC overnight; was on 3L NC during the day.  TELE:Afib CVR/RVR (100s at rest; 120s-140 when ambulating).  GI/: Ambulates to restroom SBA.  SKIN: Edema in BLE.  ACTIVITY: SBA, steady.  DIET: 2g NA; 1800mL fluid restriction.  LINES/DRAINS: PIV SL.  PLAN: BID lasix, monitor breathing/Spo2 and HR; pt on ER Metoprolol and Eliquis.    Goal Outcome Evaluation:      Plan of Care Reviewed With: patient    Overall Patient Progress: improvingOverall Patient Progress: improving    Outcome Evaluation: 5L NC overnight to keep Spo2> 90; was on 3L on dayshift, HR came down overnight when resting; around 100s; 120s-140s when ambulating; takes ER Metoprolol; pt denies CP.      Problem: Adult Inpatient Plan of Care  Goal: Plan of Care Review  Description: The Plan of Care Review/Shift note should be completed every shift.  The Outcome Evaluation is a brief statement about your assessment that the patient is improving, declining, or no change.  This information will be displayed automatically on your shift  note.  Outcome: Progressing  Flowsheets (Taken 5/13/2024 0642)  Outcome Evaluation:   5L NC overnight to keep Spo2> 90   was on 3L on dayshift, HR came down overnight when resting   around 100s   120s-140s when ambulating   takes ER Metoprolol   pt denies CP.  Plan of Care Reviewed With: patient  Overall Patient Progress: improving  Goal: Patient-Specific Goal (Individualized)  Description: You can add care plan individualizations to a care plan. Examples of Individualization might be:  \"Parent requests to be called daily at 9am for status\", \"I have a hard time hearing out of my right ear\", or \"Do not touch me to wake me up as it startles  me\".  Outcome: Progressing  Goal: Absence " of Hospital-Acquired Illness or Injury  Outcome: Progressing  Intervention: Identify and Manage Fall Risk  Recent Flowsheet Documentation  Taken 5/12/2024 2053 by Tomasa Garcia RN  Safety Promotion/Fall Prevention:   safety round/check completed   supervised activity   room organization consistent   room near nurse's station   room door open   patient and family education   nonskid shoes/slippers when out of bed   mobility aid in reach   lighting adjusted   increase visualization of patient   increased rounding and observation   clutter free environment maintained   assistive device/personal items within reach   activity supervised  Intervention: Prevent Skin Injury  Recent Flowsheet Documentation  Taken 5/12/2024 2053 by Tomasa Garcia RN  Body Position: position changed independently  Intervention: Prevent and Manage VTE (Venous Thromboembolism) Risk  Recent Flowsheet Documentation  Taken 5/12/2024 2053 by Tomasa Garcia RN  VTE Prevention/Management: SCDs (sequential compression devices) off  Intervention: Prevent Infection  Recent Flowsheet Documentation  Taken 5/12/2024 2053 by Tomasa Garcia RN  Infection Prevention:   rest/sleep promoted   single patient room provided  Goal: Optimal Comfort and Wellbeing  Outcome: Progressing  Goal: Readiness for Transition of Care  Outcome: Progressing     Problem: Comorbidity Management  Goal: Maintenance of Heart Failure Symptom Control  Outcome: Progressing  Intervention: Maintain Heart Failure Management  Recent Flowsheet Documentation  Taken 5/12/2024 2053 by Tomasa Garcia RN  Medication Review/Management: medications reviewed  Goal: Blood Pressure in Desired Range  Outcome: Progressing  Intervention: Maintain Blood Pressure Management  Recent Flowsheet Documentation  Taken 5/12/2024 2053 by Tomasa Garcia RN  Medication Review/Management: medications reviewed     Problem: Fall Injury Risk  Goal: Absence of Fall and Fall-Related Injury  Outcome:  Progressing  Intervention: Identify and Manage Contributors  Recent Flowsheet Documentation  Taken 5/12/2024 2053 by Tomasa Garcia, RN  Medication Review/Management: medications reviewed  Intervention: Promote Injury-Free Environment  Recent Flowsheet Documentation  Taken 5/12/2024 2053 by Tomasa Garcia, RN  Safety Promotion/Fall Prevention:   safety round/check completed   supervised activity   room organization consistent   room near nurse's station   room door open   patient and family education   nonskid shoes/slippers when out of bed   mobility aid in reach   lighting adjusted   increase visualization of patient   increased rounding and observation   clutter free environment maintained   assistive device/personal items within reach   activity supervised

## 2024-05-13 NOTE — CONSULTS
[facility]    Cardiology Consultation     Date of Admission:  5/12/2024    Assessment & Plan   Persistent atrial fibrillation with rapid ventricular response  History of significant bradycardia whilst on higher doses of negatively chronotropic medications  History of left atrial thrombus but has been therapeutically anticoagulated for the past few months  Probable mild diastolic heart failure secondary to A-fib with RVR  COPD exacerbation probably from allergies  Morbid obesity with high likelihood of CATARINO    At this point the A-fib with RVR she appears well compensated at this time and has no symptoms from the arrhythmia at rest.  I have recommended repeat ADAMARIS and if no thrombus cardioversion.  As she ate breakfast this morning we will schedule it for tomorrow    DR LYLY AYALA MD, MD    Primary Care Physician   [facility]    Reason for Consult   Reason for consult: I was asked by hospitalist service to evaluate this patient for atrial fibrillation.    History of Present Illness   She is is a very pleasant 54-year-old female with a past medical history significant for nonrheumatic aortic valve stenosis, undiagnosed COPD, former smoker, hypertension, hyperlipidemia, and morbid obesity.      She presented to [facility] on 2/14/2024 with shortness of breath, found to be in acute diastolic heart failure and atrial fibrillation with RVR.  I saw her on this admission. echocardiogram showed a normal ejection fraction estimated at 55 to 60%, mild aortic stenosis with a mean gradient of 17 mmHg and mild aortic regurgitation, mildly dilated left atrium and mild mitral regurgitation.  She was aggressively diuresed and transitioned to oral lasix.  Rate control was initiated with diltiazem, metoprolol and digoxin.  She has a [id number] 4 and initiated on Eliquis.  A ADAMARIS/cardioversion was recommended but ADAMARIS confirmed a thrombus in the left atrial appendage.   Unfortunately, she developed bradycardia at night with pauses.  There was a strong suspicion for obstructive sleep apnea, outpatient sleep study was scheduled.      She was seen in outpatient follow-up with Dr. Stevens in March 2024 who reviewed her episodes of bradycardia, pauses up to 10 seconds in early a.m., asymptomatic runs of NSVT, and management of her atrial fibrillation.  It was felt her rate control was too tight and digoxin was discontinued and Toprol 25 mg was decreased from twice daily to daily.     She had seen a sleep physician but has not been prescribed a CPAP mask as yet    Approximately week prior to admission she was suffering badly from allergic rhinitis.  She used to receive shots from the allergist least once a month nasal passages became very stuffed up and she became progressively more short of breath.  She denies PND orthopnea or ankle swelling.  She is not aware of her heart rate going fast.  She denies chest discomfort.  She also denies PND orthopnea or worsening ankle swelling.  There are 2 prior to admission she admitted to having higher than usual salt intake.  She was enjoying her chips.  She has been compliant with Eliquis    Basic metabolic panel shows a BUN of 33.8 creatinine 0.94 sodium 142 potassium 4.9 carbon dioxide 22  NT proBNP 1302, troponin 16  CBC within normal limits  EKG shows atrial fibrillation heart rate 130 nonspecific ST segment changes  Radiologist reported chest x-ray showing mild interstitial prominence.        Past Medical History   No past medical history on file.  See above    Past Surgical History   Past Surgical History:   Procedure Laterality Date    CHOLECYSTECTOMY  2004         Prior to Admission Medications   Prior to Admission Medications   Prescriptions Last Dose Informant Patient Reported? Taking?   albuterol (VENTOLIN HFA) 108 (90 Base) MCG/ACT inhaler Unknown at PRN  No Yes   Sig: Inhale 2 puffs into the lungs every 6 hours as needed for shortness of  breath, wheezing or cough   apixaban ANTICOAGULANT (ELIQUIS) 5 MG tablet 5/12/2024 at X1  No Yes   Sig: Take 1 tablet (5 mg) by mouth 2 times daily   atorvastatin (LIPITOR) 10 MG tablet 5/11/2024 at HS  No Yes   Sig: Take 1 tablet (10 mg) by mouth every evening   cetirizine (ZYRTEC) 10 MG tablet 5/11/2024 at AM  No Yes   Sig: Take 1 tablet (10 mg) by mouth daily   diltiazem ER COATED BEADS (CARDIZEM CD/CARTIA XT) 240 MG 24 hr capsule 5/12/2024 at AM  No Yes   Sig: Take 1 capsule (240 mg) by mouth daily   fluticasone (FLONASE) 50 MCG/ACT nasal spray Unknown at PRN  Yes Yes   Sig: Spray 1 spray into both nostrils daily as needed for rhinitis or allergies   furosemide (LASIX) 40 MG tablet 5/12/2024 at AM  No Yes   Sig: Take 1 tablet (40 mg) by mouth daily   metoprolol succinate ER (TOPROL XL) 25 MG 24 hr tablet 5/11/2024 at HS  No Yes   Sig: Take 1 tablet (25 mg) by mouth every evening      Facility-Administered Medications: None     Current Facility-Administered Medications   Medication Dose Route Frequency Provider Last Rate Last Admin    acetaminophen (TYLENOL) tablet 650 mg  650 mg Oral Q4H PRN Tena Moura DO        Or    acetaminophen (TYLENOL) Suppository 650 mg  650 mg Rectal Q4H PRN Tena Moura, DO        apixaban ANTICOAGULANT (ELIQUIS) tablet 5 mg  5 mg Oral BID Tena Moura, DO   5 mg at 05/12/24 2043    atorvastatin (LIPITOR) tablet 10 mg  10 mg Oral QPM Martell Tena, DO   10 mg at 05/12/24 2043    azithromycin (ZITHROMAX) tablet 500 mg  500 mg Oral Daily Tena Moura DO        cetirizine (zyrTEC) tablet 10 mg  10 mg Oral Daily Martell, Tena, DO   10 mg at 05/12/24 1842    diltiazem ER COATED BEADS (CARDIZEM CD/CARTIA XT) 24 hr capsule 240 mg  240 mg Oral Daily Serjio Steinberg MD        fluticasone (FLONASE) 50 MCG/ACT spray 1 spray  1 spray Both Nostrils Daily PRN Tena Moura DO        furosemide (LASIX) injection 40 mg  40 mg Intravenous BID Idris,  MD Serjio        ipratropium - albuterol 0.5 mg/2.5 mg/3 mL (DUONEB) neb solution 3 mL  3 mL Nebulization Q4H PRN Martell, Tena, DO        lidocaine (LMX4) cream   Topical Q1H PRN Martell, Tena, DO        lidocaine 1 % 0.1-1 mL  0.1-1 mL Other Q1H PRN Martell, Tena, DO        metoprolol succinate ER (TOPROL XL) 24 hr tablet 25 mg  25 mg Oral QPM Serjio Steinberg MD   25 mg at 05/12/24 1858    Patient is already receiving anticoagulation with heparin, enoxaparin (LOVENOX), warfarin (COUMADIN)  or other anticoagulant medication   Does not apply Continuous PRN Martell, Tena, DO        predniSONE (DELTASONE) tablet 40 mg  40 mg Oral Daily Martell, Tena, DO        senna-docusate (SENOKOT-S/PERICOLACE) 8.6-50 MG per tablet 1 tablet  1 tablet Oral BID PRN Martell, Tena, DO        Or    senna-docusate (SENOKOT-S/PERICOLACE) 8.6-50 MG per tablet 2 tablet  2 tablet Oral BID PRN Martell, Tena, DO        sodium chloride (PF) 0.9% PF flush 3 mL  3 mL Intracatheter Q8H Martell, Tena, DO        sodium chloride (PF) 0.9% PF flush 3 mL  3 mL Intracatheter q1 min prn Matrell, Tena, DO         Current Facility-Administered Medications   Medication Dose Route Frequency Provider Last Rate Last Admin    acetaminophen (TYLENOL) tablet 650 mg  650 mg Oral Q4H PRN Martell, Tena, DO        Or    acetaminophen (TYLENOL) Suppository 650 mg  650 mg Rectal Q4H PRN Martell, Tena, DO        apixaban ANTICOAGULANT (ELIQUIS) tablet 5 mg  5 mg Oral BID Martell, Tena, DO   5 mg at 05/12/24 2043    atorvastatin (LIPITOR) tablet 10 mg  10 mg Oral QPM Martell, Tena, DO   10 mg at 05/12/24 2043    azithromycin (ZITHROMAX) tablet 500 mg  500 mg Oral Daily Kendal Mouraine, DO        cetirizine (zyrTEC) tablet 10 mg  10 mg Oral Daily Tena Moura DO   10 mg at 05/12/24 1842    diltiazem ER COATED BEADS (CARDIZEM CD/CARTIA XT) 24 hr capsule 240 mg  240 mg Oral  Daily Serjio Steinberg MD        fluticasone (FLONASE) 50 MCG/ACT spray 1 spray  1 spray Both Nostrils Daily PRN Tena Moura DO        furosemide (LASIX) injection 40 mg  40 mg Intravenous BID Serjio Steinberg MD        ipratropium - albuterol 0.5 mg/2.5 mg/3 mL (DUONEB) neb solution 3 mL  3 mL Nebulization Q4H PRN Kendal Mouraine, DO        lidocaine (LMX4) cream   Topical Q1H PRN Kendal Mouraine, DO        lidocaine 1 % 0.1-1 mL  0.1-1 mL Other Q1H PRN Martell, Tena, DO        metoprolol succinate ER (TOPROL XL) 24 hr tablet 25 mg  25 mg Oral QPM Serjio Steinberg MD   25 mg at 05/12/24 4978    Patient is already receiving anticoagulation with heparin, enoxaparin (LOVENOX), warfarin (COUMADIN)  or other anticoagulant medication   Does not apply Continuous PRN Kendal Mouraine, DO        predniSONE (DELTASONE) tablet 40 mg  40 mg Oral Daily Martell, Tena, DO        senna-docusate (SENOKOT-S/PERICOLACE) 8.6-50 MG per tablet 1 tablet  1 tablet Oral BID PRN Kendal Mouraine, DO        Or    senna-docusate (SENOKOT-S/PERICOLACE) 8.6-50 MG per tablet 2 tablet  2 tablet Oral BID PRN Martell, Tena, DO        sodium chloride (PF) 0.9% PF flush 3 mL  3 mL Intracatheter Q8H Kendal Mouraine, DO        sodium chloride (PF) 0.9% PF flush 3 mL  3 mL Intracatheter q1 min prn Martell, Tena, DO         Allergies   Allergies   Allergen Reactions    Fluoxetine Rash    Morphine Rash    Percocet [Oxycodone-Acetaminophen] Rash       Social History    reports that she has been smoking cigarettes. She has never used smokeless tobacco. She reports current alcohol use. She reports that she does not use drugs.      Family History   I have reviewed this patient's family history and updated it with pertinent information if needed.  Family History   Problem Relation Age of Onset    Family History Negative Mother     Pacemaker Father         history of HTN prior to bariatric surgery    Prostate  "Cancer Paternal Grandfather 88          Review of Systems   A comprehensive review of system was performed and is negative other than that noted in the HPI or here.     Physical Exam   Vital Signs with Ranges  Temp:  [97.9  F (36.6  C)-98.4  F (36.9  C)] 98  F (36.7  C)  Pulse:  [] 130  Resp:  [16-30] 18  BP: ()/() 86/59  SpO2:  [87 %-98 %] 95 %  Wt Readings from Last 4 Encounters:   05/12/24 129.9 kg (286 lb 6.4 oz)   04/24/24 131.5 kg (289 lb 14.4 oz)   03/07/24 136.6 kg (301 lb 1.6 oz)   02/20/24 133 kg (293 lb 3.2 oz)     I/O last 3 completed shifts:  In: 960 [P.O.:960]  Out: 700 [Urine:700]      Vitals: BP (!) 86/59 (BP Location: Left arm)   Pulse (!) 130   Temp 98  F (36.7  C) (Oral)   Resp 18   Ht 1.549 m (5' 1\")   Wt 129.9 kg (286 lb 6.4 oz)   LMP 12/14/2020   SpO2 95%   BMI 54.11 kg/m      Physical Exam:   General - Alert and oriented to time place and person in no acute distress  Eyes - No scleral icterus  HEENT - Neck supple, moist mucous membranes  Cardiovascular -irregular heart rhythm, tachycardic.  Cardiac apex nonpalpable.  Heart sounds unremarkable.    Extremities - There is trace bilateral edema  Respiratory -no use of accessory muscles of respiration.  Diminished breath sounds but no added wheezes or crackles  Skin - No pallor or cyanosis  Gastrointestinal - Non tender and non distended without rebound or guarding  Psych - Appropriate affect   Neurological - No gross motor neurological focal deficits    No lab results found in last 7 days.    Invalid input(s): \"TROPONINIES\"    Recent Labs   Lab 05/13/24  0748 05/12/24  1513   WBC 8.5 8.0   HGB 12.3 13.7   MCV 96 92    238    145   POTASSIUM 4.9 4.3   CHLORIDE 106 104   CO2 22 25   BUN 33.8* 28.3*   CR 0.94 1.13*   GFRESTIMATED 72 58*   ANIONGAP 14 16*   KRISTA 8.4* 8.7   * 101*     Recent Labs   Lab Test 02/15/24  0636   CHOL 145   HDL 36*   LDL 87   TRIG 108     Recent Labs   Lab 05/13/24  0748 " "05/12/24  1513   WBC 8.5 8.0   HGB 12.3 13.7   HCT 41.1 43.6   MCV 96 92    238     Recent Labs   Lab 05/12/24  1520 05/12/24  1513   PHV 7.37 7.38   PO2V 28 30   PCO2V 50 51*   HCO3V 29* 30*     Recent Labs   Lab 05/12/24  1513   NTBNPI 1,302*     No results for input(s): \"DD\" in the last 168 hours.  No results for input(s): \"SED\", \"CRP\" in the last 168 hours.  Recent Labs   Lab 05/13/24  0748 05/12/24  1513    238     No results for input(s): \"TSH\" in the last 168 hours.  No results for input(s): \"COLOR\", \"APPEARANCE\", \"URINEGLC\", \"URINEBILI\", \"URINEKETONE\", \"SG\", \"UBLD\", \"URINEPH\", \"PROTEIN\", \"UROBILINOGEN\", \"NITRITE\", \"LEUKEST\", \"RBCU\", \"WBCU\" in the last 168 hours.    Imaging:  Recent Results (from the past 48 hour(s))   POC US ECHO LIMITED    Impression      Cardiac Ultrasound    Procedure Name: POC Ultrasound Cardiac Exam    Indication: Shortness of breath    Views: Parasternal long axis view , Parasternal short axis view , Apical 4 chamber view , Subxiphoid long axis, IVC , and bilateral lungs    Findings: No pericardial effusion , Adequate left ventricular function , No right ventricular strain, and scattered B-lines, approximately 3 bilaterally, no pleural effusion, dilated IVC    Impression: No sonographic evidence of significant cardiac tamponade , No sonographic evidence of RV size dilation , No sonographic evidence of volume depletion , and no strong evidence for pulmonary edema    Study performed by: DELFIN KERN MD     Images archived: Yes     XR Chest 2 Views    Narrative    EXAM: XR CHEST 2 VIEWS  LOCATION: [facility]  DATE: 5/12/2024    INDICATION: sob  COMPARISON: 2/14/2024      Impression    IMPRESSION: Minimal change. Cardiomegaly with mild central hilar fullness. Mild diffuse interstitial prominence. No definite focal pneumonia or pleural effusion.       Echo:  No results found for this or any previous visit (from the past 4320 hour(s)).    Clinically " "Significant Risk Factors Present on Admission               # Drug Induced Coagulation Defect: home medication list includes an anticoagulant medication    # Hypertension: Noted on problem list      # Severe Obesity: Estimated body mass index is 54.11 kg/m  as calculated from the following:    Height as of this encounter: 1.549 m (5' 1\").    Weight as of this encounter: 129.9 kg (286 lb 6.4 oz).             Cardiac Arrhythmia: Atrial fibrillation: Persistent                                                    "

## 2024-05-14 ENCOUNTER — APPOINTMENT (OUTPATIENT)
Dept: CARDIOLOGY | Facility: CLINIC | Age: 54
End: 2024-05-14
Attending: INTERNAL MEDICINE
Payer: COMMERCIAL

## 2024-05-14 ENCOUNTER — ANESTHESIA EVENT (OUTPATIENT)
Dept: SURGERY | Facility: CLINIC | Age: 54
End: 2024-05-14
Payer: COMMERCIAL

## 2024-05-14 ENCOUNTER — ANESTHESIA (OUTPATIENT)
Dept: SURGERY | Facility: CLINIC | Age: 54
End: 2024-05-14
Payer: COMMERCIAL

## 2024-05-14 LAB
ANION GAP SERPL CALCULATED.3IONS-SCNC: 10 MMOL/L (ref 7–15)
BUN SERPL-MCNC: 33.1 MG/DL (ref 6–20)
CALCIUM SERPL-MCNC: 8.7 MG/DL (ref 8.6–10)
CHLORIDE SERPL-SCNC: 106 MMOL/L (ref 98–107)
CREAT SERPL-MCNC: 0.86 MG/DL (ref 0.51–0.95)
DEPRECATED HCO3 PLAS-SCNC: 24 MMOL/L (ref 22–29)
EGFRCR SERPLBLD CKD-EPI 2021: 80 ML/MIN/1.73M2
GLUCOSE SERPL-MCNC: 125 MG/DL (ref 70–99)
INR PPP: 1.25 (ref 0.85–1.15)
LVEF ECHO: NORMAL
MAGNESIUM SERPL-MCNC: 2.2 MG/DL (ref 1.7–2.3)
POTASSIUM SERPL-SCNC: 4.4 MMOL/L (ref 3.4–5.3)
POTASSIUM SERPL-SCNC: 4.4 MMOL/L (ref 3.4–5.3)
SODIUM SERPL-SCNC: 140 MMOL/L (ref 135–145)

## 2024-05-14 PROCEDURE — 250N000012 HC RX MED GY IP 250 OP 636 PS 637: Performed by: HOSPITALIST

## 2024-05-14 PROCEDURE — 93010 ELECTROCARDIOGRAM REPORT: CPT | Performed by: INTERNAL MEDICINE

## 2024-05-14 PROCEDURE — 99232 SBSQ HOSP IP/OBS MODERATE 35: CPT | Mod: 25 | Performed by: INTERNAL MEDICINE

## 2024-05-14 PROCEDURE — 92960 CARDIOVERSION ELECTRIC EXT: CPT | Performed by: INTERNAL MEDICINE

## 2024-05-14 PROCEDURE — 99233 SBSQ HOSP IP/OBS HIGH 50: CPT | Performed by: HOSPITALIST

## 2024-05-14 PROCEDURE — 120N000001 HC R&B MED SURG/OB

## 2024-05-14 PROCEDURE — 93325 DOPPLER ECHO COLOR FLOW MAPG: CPT | Mod: 26 | Performed by: INTERNAL MEDICINE

## 2024-05-14 PROCEDURE — 93325 DOPPLER ECHO COLOR FLOW MAPG: CPT

## 2024-05-14 PROCEDURE — 250N000009 HC RX 250: Performed by: STUDENT IN AN ORGANIZED HEALTH CARE EDUCATION/TRAINING PROGRAM

## 2024-05-14 PROCEDURE — 93312 ECHO TRANSESOPHAGEAL: CPT | Mod: 26 | Performed by: INTERNAL MEDICINE

## 2024-05-14 PROCEDURE — 250N000011 HC RX IP 250 OP 636: Performed by: INTERNAL MEDICINE

## 2024-05-14 PROCEDURE — 84132 ASSAY OF SERUM POTASSIUM: CPT | Performed by: INTERNAL MEDICINE

## 2024-05-14 PROCEDURE — 36415 COLL VENOUS BLD VENIPUNCTURE: CPT | Performed by: INTERNAL MEDICINE

## 2024-05-14 PROCEDURE — 370N000017 HC ANESTHESIA TECHNICAL FEE, PER MIN

## 2024-05-14 PROCEDURE — 999N000157 HC STATISTIC RCP TIME EA 10 MIN

## 2024-05-14 PROCEDURE — 93320 DOPPLER ECHO COMPLETE: CPT | Mod: 26 | Performed by: INTERNAL MEDICINE

## 2024-05-14 PROCEDURE — 250N000011 HC RX IP 250 OP 636: Performed by: STUDENT IN AN ORGANIZED HEALTH CARE EDUCATION/TRAINING PROGRAM

## 2024-05-14 PROCEDURE — 83735 ASSAY OF MAGNESIUM: CPT | Performed by: INTERNAL MEDICINE

## 2024-05-14 PROCEDURE — 250N000011 HC RX IP 250 OP 636: Performed by: NURSE ANESTHETIST, CERTIFIED REGISTERED

## 2024-05-14 PROCEDURE — 250N000013 HC RX MED GY IP 250 OP 250 PS 637: Performed by: INTERNAL MEDICINE

## 2024-05-14 PROCEDURE — 80048 BASIC METABOLIC PNL TOTAL CA: CPT | Performed by: HOSPITALIST

## 2024-05-14 PROCEDURE — 250N000009 HC RX 250: Performed by: HOSPITALIST

## 2024-05-14 PROCEDURE — 5A2204Z RESTORATION OF CARDIAC RHYTHM, SINGLE: ICD-10-PCS | Performed by: INTERNAL MEDICINE

## 2024-05-14 PROCEDURE — 250N000013 HC RX MED GY IP 250 OP 250 PS 637: Performed by: STUDENT IN AN ORGANIZED HEALTH CARE EDUCATION/TRAINING PROGRAM

## 2024-05-14 PROCEDURE — 94640 AIRWAY INHALATION TREATMENT: CPT

## 2024-05-14 PROCEDURE — 85610 PROTHROMBIN TIME: CPT | Performed by: INTERNAL MEDICINE

## 2024-05-14 RX ORDER — GLYCOPYRROLATE 0.2 MG/ML
0.1 INJECTION, SOLUTION INTRAMUSCULAR; INTRAVENOUS ONCE
Status: COMPLETED | OUTPATIENT
Start: 2024-05-14 | End: 2024-05-14

## 2024-05-14 RX ORDER — NALOXONE HYDROCHLORIDE 0.4 MG/ML
INJECTION, SOLUTION INTRAMUSCULAR; INTRAVENOUS; SUBCUTANEOUS
Status: DISCONTINUED
Start: 2024-05-14 | End: 2024-05-14 | Stop reason: WASHOUT

## 2024-05-14 RX ORDER — FENTANYL CITRATE 50 UG/ML
INJECTION, SOLUTION INTRAMUSCULAR; INTRAVENOUS PRN
Status: COMPLETED | OUTPATIENT
Start: 2024-05-14 | End: 2024-05-14

## 2024-05-14 RX ORDER — FENTANYL CITRATE 50 UG/ML
25 INJECTION, SOLUTION INTRAMUSCULAR; INTRAVENOUS
Status: DISCONTINUED | OUTPATIENT
Start: 2024-05-14 | End: 2024-05-15 | Stop reason: HOSPADM

## 2024-05-14 RX ORDER — NALOXONE HYDROCHLORIDE 0.4 MG/ML
0.4 INJECTION, SOLUTION INTRAMUSCULAR; INTRAVENOUS; SUBCUTANEOUS
Status: DISCONTINUED | OUTPATIENT
Start: 2024-05-14 | End: 2024-05-15 | Stop reason: HOSPADM

## 2024-05-14 RX ORDER — GLYCOPYRROLATE 0.2 MG/ML
INJECTION, SOLUTION INTRAMUSCULAR; INTRAVENOUS
Status: COMPLETED
Start: 2024-05-14 | End: 2024-05-14

## 2024-05-14 RX ORDER — FENTANYL CITRATE 50 UG/ML
50 INJECTION, SOLUTION INTRAMUSCULAR; INTRAVENOUS ONCE
Status: DISCONTINUED | OUTPATIENT
Start: 2024-05-14 | End: 2024-05-15 | Stop reason: HOSPADM

## 2024-05-14 RX ORDER — PROPOFOL 10 MG/ML
INJECTION, EMULSION INTRAVENOUS PRN
Status: DISCONTINUED | OUTPATIENT
Start: 2024-05-14 | End: 2024-05-14

## 2024-05-14 RX ORDER — SODIUM CHLORIDE 9 MG/ML
INJECTION, SOLUTION INTRAVENOUS CONTINUOUS PRN
Status: DISCONTINUED | OUTPATIENT
Start: 2024-05-14 | End: 2024-05-15 | Stop reason: HOSPADM

## 2024-05-14 RX ORDER — DEXTROSE MONOHYDRATE 25 G/50ML
9.5 INJECTION, SOLUTION INTRAVENOUS
Status: DISCONTINUED | OUTPATIENT
Start: 2024-05-14 | End: 2024-05-15 | Stop reason: HOSPADM

## 2024-05-14 RX ORDER — FLUMAZENIL 0.1 MG/ML
INJECTION, SOLUTION INTRAVENOUS
Status: DISCONTINUED
Start: 2024-05-14 | End: 2024-05-14 | Stop reason: WASHOUT

## 2024-05-14 RX ORDER — NALOXONE HYDROCHLORIDE 0.4 MG/ML
0.2 INJECTION, SOLUTION INTRAMUSCULAR; INTRAVENOUS; SUBCUTANEOUS
Status: DISCONTINUED | OUTPATIENT
Start: 2024-05-14 | End: 2024-05-15 | Stop reason: HOSPADM

## 2024-05-14 RX ORDER — FENTANYL CITRATE 50 UG/ML
INJECTION, SOLUTION INTRAMUSCULAR; INTRAVENOUS
Status: DISPENSED
Start: 2024-05-14 | End: 2024-05-14

## 2024-05-14 RX ORDER — FLUMAZENIL 0.1 MG/ML
0.2 INJECTION, SOLUTION INTRAVENOUS
Status: DISCONTINUED | OUTPATIENT
Start: 2024-05-14 | End: 2024-05-15 | Stop reason: HOSPADM

## 2024-05-14 RX ORDER — LIDOCAINE HYDROCHLORIDE 20 MG/ML
SOLUTION OROPHARYNGEAL
Status: COMPLETED
Start: 2024-05-14 | End: 2024-05-14

## 2024-05-14 RX ORDER — LIDOCAINE HYDROCHLORIDE 20 MG/ML
15 SOLUTION OROPHARYNGEAL ONCE
Status: COMPLETED | OUTPATIENT
Start: 2024-05-14 | End: 2024-05-14

## 2024-05-14 RX ADMIN — TOPICAL ANESTHETIC 1 ML: 200 SPRAY DENTAL; PERIODONTAL at 11:17

## 2024-05-14 RX ADMIN — LIDOCAINE HYDROCHLORIDE 30 ML: 20 SOLUTION ORAL at 11:00

## 2024-05-14 RX ADMIN — CETIRIZINE HYDROCHLORIDE 10 MG: 10 TABLET, FILM COATED ORAL at 08:36

## 2024-05-14 RX ADMIN — GLYCOPYRROLATE 0.1 MG: 0.2 INJECTION, SOLUTION INTRAMUSCULAR; INTRAVENOUS at 11:00

## 2024-05-14 RX ADMIN — DILTIAZEM HYDROCHLORIDE 30 MG: 30 TABLET, FILM COATED ORAL at 14:33

## 2024-05-14 RX ADMIN — AZITHROMYCIN DIHYDRATE 500 MG: 250 TABLET ORAL at 08:36

## 2024-05-14 RX ADMIN — MIDAZOLAM 1 MG: 1 INJECTION INTRAMUSCULAR; INTRAVENOUS at 11:30

## 2024-05-14 RX ADMIN — PROPOFOL 30 MG: 10 INJECTION, EMULSION INTRAVENOUS at 11:52

## 2024-05-14 RX ADMIN — MIDAZOLAM 0.5 MG: 1 INJECTION INTRAMUSCULAR; INTRAVENOUS at 11:37

## 2024-05-14 RX ADMIN — APIXABAN 5 MG: 5 TABLET, FILM COATED ORAL at 20:37

## 2024-05-14 RX ADMIN — ATORVASTATIN CALCIUM 10 MG: 10 TABLET, FILM COATED ORAL at 20:37

## 2024-05-14 RX ADMIN — MIDAZOLAM 0.5 MG: 1 INJECTION INTRAMUSCULAR; INTRAVENOUS at 11:32

## 2024-05-14 RX ADMIN — PROPOFOL 20 MG: 10 INJECTION, EMULSION INTRAVENOUS at 11:53

## 2024-05-14 RX ADMIN — FENTANYL CITRATE 50 MCG: 50 INJECTION INTRAMUSCULAR; INTRAVENOUS at 11:30

## 2024-05-14 RX ADMIN — LIDOCAINE HYDROCHLORIDE 30 ML: 20 SOLUTION OROPHARYNGEAL at 11:00

## 2024-05-14 RX ADMIN — LEVALBUTEROL HYDROCHLORIDE 0.63 MG: 0.63 SOLUTION RESPIRATORY (INHALATION) at 17:34

## 2024-05-14 RX ADMIN — DILTIAZEM HYDROCHLORIDE 30 MG: 30 TABLET, FILM COATED ORAL at 20:36

## 2024-05-14 RX ADMIN — LEVALBUTEROL HYDROCHLORIDE 0.63 MG: 0.63 SOLUTION RESPIRATORY (INHALATION) at 09:13

## 2024-05-14 RX ADMIN — APIXABAN 5 MG: 5 TABLET, FILM COATED ORAL at 08:36

## 2024-05-14 RX ADMIN — MIDAZOLAM 0.5 MG: 1 INJECTION INTRAMUSCULAR; INTRAVENOUS at 11:34

## 2024-05-14 RX ADMIN — PREDNISONE 40 MG: 20 TABLET ORAL at 08:36

## 2024-05-14 ASSESSMENT — ENCOUNTER SYMPTOMS: DYSRHYTHMIAS: 1

## 2024-05-14 ASSESSMENT — ACTIVITIES OF DAILY LIVING (ADL)
ADLS_ACUITY_SCORE: 33

## 2024-05-14 NOTE — PLAN OF CARE
"A&O. VSS. Denies pain. Pt on 1-2L O2 NC. LS diminished. Tele Afib -150's at restl HR up to 170-180's with activity. PO diltiazem dose decreased by MD and ordered to give dose despite SBP parameter in place and IV Digoxin given x1 with improvement. 2.1 second pause noted on tele around 0200. NPO for ADAMARIS/cardioversion today. Up SBA.    Goal Outcome Evaluation:      Plan of Care Reviewed With: patient    Overall Patient Progress: no changeOverall Patient Progress: no change    Outcome Evaluation: Afib -150's. Improved with IV Digoxin x1    Problem: Adult Inpatient Plan of Care  Goal: Plan of Care Review  Description: The Plan of Care Review/Shift note should be completed every shift.  The Outcome Evaluation is a brief statement about your assessment that the patient is improving, declining, or no change.  This information will be displayed automatically on your shift  note.  Outcome: Progressing  Flowsheets (Taken 5/14/2024 0338)  Outcome Evaluation: Afib -150's. Improved with IV Digoxin x1  Plan of Care Reviewed With: patient  Overall Patient Progress: no change  Goal: Patient-Specific Goal (Individualized)  Description: You can add care plan individualizations to a care plan. Examples of Individualization might be:  \"Parent requests to be called daily at 9am for status\", \"I have a hard time hearing out of my right ear\", or \"Do not touch me to wake me up as it startles  me\".  Outcome: Progressing  Goal: Absence of Hospital-Acquired Illness or Injury  Outcome: Progressing  Intervention: Identify and Manage Fall Risk  Recent Flowsheet Documentation  Taken 5/13/2024 2102 by Chanelle Marvin, RN  Safety Promotion/Fall Prevention:   activity supervised   clutter free environment maintained   nonskid shoes/slippers when out of bed   safety round/check completed  Intervention: Prevent Skin Injury  Recent Flowsheet Documentation  Taken 5/13/2024 2102 by Chanelle Marvin, RN  Body Position: position " changed independently  Intervention: Prevent and Manage VTE (Venous Thromboembolism) Risk  Recent Flowsheet Documentation  Taken 5/13/2024 2102 by Chanelle Marvin, RN  VTE Prevention/Management: SCDs (sequential compression devices) on  Intervention: Prevent Infection  Recent Flowsheet Documentation  Taken 5/13/2024 2102 by Chanelle Marvin, RN  Infection Prevention:   rest/sleep promoted   hand hygiene promoted  Goal: Optimal Comfort and Wellbeing  Outcome: Progressing  Goal: Readiness for Transition of Care  Outcome: Progressing

## 2024-05-14 NOTE — PROGRESS NOTES
[facility] Cardiology Progress Note       Assessment and Plan:   Will stop toprol xl in view of wheezing.  Probably should maintain pt on low dose of diltiazem in case she goes back into afib.  Likely discharge tomorrow.            Interval History:   Successful cardioversion today, in NSR.  No cardiac symptoms at this time.                  Medications:     Current Facility-Administered Medications   Medication Dose Route Frequency Provider Last Rate Last Admin    acetaminophen (TYLENOL) tablet 650 mg  650 mg Oral Q4H PRN Tena Moura DO        Or    acetaminophen (TYLENOL) Suppository 650 mg  650 mg Rectal Q4H PRN Kendal Mouraine DO        apixaban ANTICOAGULANT (ELIQUIS) tablet 5 mg  5 mg Oral BID Tena Moura DO   5 mg at 05/14/24 0836    atorvastatin (LIPITOR) tablet 10 mg  10 mg Oral QPM eTna Moura, DO   10 mg at 05/13/24 2057    cetirizine (zyrTEC) tablet 10 mg  10 mg Oral Daily Tena Moura, DO   10 mg at 05/14/24 0836    sodium chloride (PF) 0.9% PF flush 9.5 mL  9.5 mL Intracatheter q1 min prn Luther Dc MD        Or    dextrose 50 % injection 9.5 mL  9.5 mL Intravenous q1 min prn Luther Dc MD        diltiazem (CARDIZEM) tablet 30 mg  30 mg Oral Q6H [facility] Corina Ardon MD   30 mg at 05/13/24 2342    fentaNYL (PF) (SUBLIMAZE) injection 25 mcg  25 mcg Intravenous Q2 Min PRN Luther Dc MD        fentaNYL (PF) (SUBLIMAZE) injection 50 mcg  50 mcg Intravenous Once Luther Dc MD        flumazenil (ROMAZICON) injection 0.2 mg  0.2 mg Intravenous q1 min prn Luther Dc MD        fluticasone (FLONASE) 50 MCG/ACT spray 1 spray  1 spray Both Nostrils Daily PRN Tena Moura DO        [Held by provider] furosemide (LASIX) injection 40 mg  40 mg Intravenous BID Serjio Steinberg MD        levalbuterol (XOPENEX) neb solution 0.63 mg  0.63 mg Nebulization Q4H PRN Serjio Steinberg MD   0.63  "mg at 05/14/24 0913    lidocaine (LMX4) cream   Topical Q1H PRN Kendal Mouraine, DO        lidocaine 1 % 0.1-1 mL  0.1-1 mL Other Q1H PRN Martell, Tena, DO        metoprolol succinate ER (TOPROL XL) 24 hr tablet 25 mg  25 mg Oral QPM Serjio Steinberg MD   25 mg at 05/13/24 2057    midazolam (VERSED) 1 MG/ML injection             midazolam (VERSED) injection 1 mg  1 mg Intravenous Q2 Min PRN Luther Dc MD        naloxone (NARCAN) injection 0.2 mg  0.2 mg Intravenous Q2 Min PRN Luther Dc MD        naloxone (NARCAN) injection 0.2 mg  0.2 mg Intramuscular Q2 Min PRN Luther Dc MD        naloxone (NARCAN) injection 0.4 mg  0.4 mg Intravenous Q2 Min PRN Luther Dc MD        naloxone (NARCAN) injection 0.4 mg  0.4 mg Intramuscular Q2 Min PRN Luther Dc MD        Patient is already receiving anticoagulation with heparin, enoxaparin (LOVENOX), warfarin (COUMADIN)  or other anticoagulant medication   Does not apply Continuous PRN Martell, Tena, DO        predniSONE (DELTASONE) tablet 40 mg  40 mg Oral Daily Serjio Steinberg MD   40 mg at 05/14/24 0836    senna-docusate (SENOKOT-S/PERICOLACE) 8.6-50 MG per tablet 1 tablet  1 tablet Oral BID PRN Kendal Mouraine, DO        Or    senna-docusate (SENOKOT-S/PERICOLACE) 8.6-50 MG per tablet 2 tablet  2 tablet Oral BID PRN Martell, Tena, DO        sodium chloride (PF) 0.9% PF flush 3 mL  3 mL Intracatheter Q8H Martell, Tena, DO   3 mL at 05/14/24 1015    sodium chloride (PF) 0.9% PF flush 3 mL  3 mL Intracatheter q1 min prn Martell, Tena, DO        sodium chloride 0.9 % infusion   Intravenous Continuous PRN Luther Dc MD                 Physical Exam:   Blood pressure 134/79, pulse 64, temperature 97.8  F (36.6  C), temperature source Oral, resp. rate 20, height 1.549 m (5' 1\"), weight 131.6 kg (290 lb 1.6 oz), last menstrual period 12/14/2020, " "SpO2 91%.  Wt Readings from Last 4 Encounters:   24 131.6 kg (290 lb 1.6 oz)   24 131.5 kg (289 lb 14.4 oz)   24 136.6 kg (301 lb 1.6 oz)   24 133 kg (293 lb 3.2 oz)         Vital Sign Ranges  Temperature Temp  Av.9  F (36.6  C)  Min: 97.7  F (36.5  C)  Max: 98.2  F (36.8  C)   Blood pressure Systolic (24hrs), Av , Min:70 , Max:137        Diastolic (24hrs), Av, Min:27, Max:120      Pulse Pulse  Av.3  Min: 64  Max: 165   Respirations Resp  Av.5  Min: 14  Max: 40   Pulse oximetry SpO2  Av.7 %  Min: 91 %  Max: 96 %         Intake/Output Summary (Last 24 hours) at 2024 1346  Last data filed at 2024 1102  Gross per 24 hour   Intake 1380 ml   Output 1050 ml   Net 330 ml          Cardiovascular:   Normal apical impulse, regular rate and rhythm, normal S1 and S2, no S3 or S4, and no murmur noted   Chest:  soft exp wheezing.  No peripheral oedema         Data:     Labs:  Lab Results   Component Value Date     2024     2021    Lab Results   Component Value Date    CHLORIDE 106 2024    CHLORIDE 109 2021    Lab Results   Component Value Date    BUN 33.1 2024    BUN 16 2021      Lab Results   Component Value Date    POTASSIUM 4.4 2024    POTASSIUM 4.4 2024    POTASSIUM 3.6 2021    Lab Results   Component Value Date    CO2 24 2024    CO2 29 2021    Lab Results   Component Value Date    CR 0.86 2024    CR 1.01 2021        Lab Results   Component Value Date    WBC 8.5 2024    HGB 12.3 2024    HCT 41.1 2024    MCV 96 2024     2024     No results found for: \"TROPONIN\", \"TROPI\", \"TROPR\"        Attestation:  I have reviewed today's vital signs, notes, medications, labs and imaging.         DR LYLY AYALA MD 2024  1:46 PM       "

## 2024-05-14 NOTE — ANESTHESIA CARE TRANSFER NOTE
Patient: Shilpa Miramontes    Procedure: Procedure(s):  Anesthesia cardioversion       Diagnosis: A-fib (H) [I48.91]  Diagnosis Additional Information: No value filed.    Anesthesia Type:   MAC     Note:    Oropharynx: oropharynx clear of all foreign objects  Level of Consciousness: awake  Oxygen Supplementation: nasal cannula  Level of Supplemental Oxygen (L/min / FiO2): 5  Independent Airway: airway patency satisfactory and stable  Dentition: dentition unchanged  Vital Signs Stable: post-procedure vital signs reviewed and stable  Report to RN Given: handoff report given  Destination: cardiopulmonary.  Comments: Report given to RN who was present for procedure.  Patient awake and conversing        Vitals:  Vitals Value Taken Time   BP     Temp     Pulse     Resp     SpO2         Electronically Signed By: JOYCE Boggs CRNA  May 14, 2024  12:23 PM

## 2024-05-14 NOTE — PROVIDER NOTIFICATION
Pt hypotensive this AM after returning from bathroom. SBP 70-80's. MD notified.     Plan to recheck BP in 15 min.    Recheck BP stable 116/67.

## 2024-05-14 NOTE — PROCEDURES
[facility]    Procedure: EP Cardioversion External    Date/Time: 5/14/2024 12:02 PM    Performed by: Luther Dc MD  Authorized by: Claude Wick MD      UNIVERSAL PROTOCOL   Site Marked: NA  Prior Images Obtained and Reviewed:  Yes  Required items: Required blood products, implants, devices and special equipment available    Patient identity confirmed:  Verbally with patient  Patient was reevaluated immediately before administering moderate or deep sedation or anesthesia  Confirmation Checklist:  Patient's identity using two indicators  Time out: Immediately prior to the procedure a time out was called    Universal Protocol: the Joint Commission Universal Protocol was followed       ANESTHESIA    Anesthesia was administered and monitored by anesthesiology.  See anesthesia documentation for details.    SEDATION  Patient Sedated: Yes    Vital signs: Vital signs monitored during sedation      PROCEDURE DETAILS  Cardioversion basis: elective  Pre-procedure rhythm: atrial fibrillation  Chest area: chest area exposed  Electrodes: pads  Electrodes placed: anterior-posterior  Number of attempts: 2  Post-procedure rhythm: normal sinus rhythm      PROCEDURE  Describe Procedure: ADAMARIS & Cardioversion Note    Indication: afib    Informed consent was signed by the patient.  A timeout was taken.    Sedation for ADAMARIS:  Versed 2.5mg  Fentanyl 50mcg    Anesthesia was present for cardioversion, see separate documentation for their sedation.    Findings:  LV: EF 40%  RV: mild RV dysfunction  LA: no thrombus, some smoke, contrast given and no filling defect in location of smoke  Mitral valve: 2+ MR  Aortic valve: 2+ AI  Tricuspid valve: 2+ TR    Cardioversion:  See anesthesia documentation for sedation during cardioversion.    Baseline rhythm: afib, rate 140  Synchronized cardioversion unsuccessful at 150J, then successful at 200J with towel pressure  Post-DCCV rhythm: sinus, rate 70's    No  complications.    Luther Dc MD  Cardiology - [facility]  Pager: [phone]  Text Page  May 14, 2024        Patient Tolerance:  Patient tolerated the procedure well with no immediate complications

## 2024-05-14 NOTE — ANESTHESIA PREPROCEDURE EVALUATION
Anesthesia Pre-Procedure Evaluation    Patient: Shilpa Miraomntes   MRN: [id number] : 1970        Procedure : Procedure(s):  Anesthesia cardioversion          No past medical history on file.   Past Surgical History:   Procedure Laterality Date    CHOLECYSTECTOMY        Allergies   Allergen Reactions    Fluoxetine Rash    Morphine Rash    Percocet [Oxycodone-Acetaminophen] Rash      Social History     Tobacco Use    Smoking status: Some Days     Current packs/day: 0.50     Types: Cigarettes    Smokeless tobacco: Never    Tobacco comments:     Socially has a cigarette while consuming alcohol    Substance Use Topics    Alcohol use: Yes     Comment: every few months      Wt Readings from Last 1 Encounters:   24 131.6 kg (290 lb 1.6 oz)        Anesthesia Evaluation            ROS/MED HX  ENT/Pulmonary:     (+)     CATARINO risk factors,  hypertension, obese,  observed stopped breathing,                              Neurologic:  - neg neurologic ROS     Cardiovascular:     (+)  - -   -  - -                        dysrhythmias, a-fib,             METS/Exercise Tolerance:     Hematologic:  - neg hematologic  ROS     Musculoskeletal:  - neg musculoskeletal ROS     GI/Hepatic:  - neg GI/hepatic ROS     Renal/Genitourinary:  - neg Renal ROS     Endo:       Psychiatric/Substance Use:  - neg psychiatric ROS     Infectious Disease:       Malignancy:       Other:            Physical Exam    Airway        Mallampati: I   TM distance: > 3 FB   Neck ROM: full   Mouth opening: > 3 cm    Respiratory Devices and Support         Dental           Cardiovascular          Rhythm and rate: irregular     Pulmonary           (+) wheezes           OUTSIDE LABS:  CBC:   Lab Results   Component Value Date    WBC 8.5 2024    WBC 8.0 2024    HGB 12.3 2024    HGB 13.7 2024    HCT 41.1 2024    HCT 43.6 2024     2024     2024     BMP:   Lab Results   Component Value Date    NA  "140 05/14/2024     05/13/2024    POTASSIUM 4.4 05/14/2024    POTASSIUM 4.4 05/14/2024    CHLORIDE 106 05/14/2024    CHLORIDE 106 05/13/2024    CO2 24 05/14/2024    CO2 22 05/13/2024    BUN 33.1 (H) 05/14/2024    BUN 33.8 (H) 05/13/2024    CR 0.86 05/14/2024    CR 0.94 05/13/2024     (H) 05/14/2024     (H) 05/13/2024     COAGS:   Lab Results   Component Value Date    PTT 33 02/15/2024    INR 1.25 (H) 05/14/2024     POC: No results found for: \"BGM\", \"HCG\", \"HCGS\"  HEPATIC:   Lab Results   Component Value Date    ALBUMIN 3.8 02/15/2024    PROTTOTAL 7.4 02/15/2024    ALT 64 (H) 02/15/2024    AST 30 02/15/2024    ALKPHOS 117 02/15/2024    BILITOTAL 0.3 02/15/2024     OTHER:   Lab Results   Component Value Date    LACT 0.8 05/12/2024    A1C 6.2 (H) 02/15/2024    KRISTA 8.7 05/14/2024    MAG 2.2 05/14/2024    TSH 2.52 02/14/2024       Anesthesia Plan    ASA Status:  3    NPO Status:  NPO Appropriate    Anesthesia Type: MAC.     - Reason for MAC: straight local not clinically adequate   Induction: Propofol.   Maintenance: TIVA.        Consents            Postoperative Care            Comments:               JOYCE Boggs CRNA    I have reviewed the pertinent notes and labs in the chart from the past 30 days and (re)examined the patient.  Any updates or changes from those notes are reflected in this note.             "

## 2024-05-14 NOTE — PROGRESS NOTES
[facility]    Hospitalist Progress Note      Assessment & Plan   Shilpa Miramontes is a 54 year old female with PMH of HFpEF, mild/moderate aortic stenosis, OHS, CATARINO, Afib, possible COPD, HLD, pre-DM, who was admitted on 5/12/2024 with SOB and cough.      #SPRING and Acute on chronic hypoxemic respiratory failure secondary to acute on chronic HFpEF. Afib w RVR.  Less likely COPD exaceberation: Presenting with 4 days of worsening dyspnea and worsening productive cough. Endorses dietary indiscretion, missed medications, and worsening allergies.  No clear chest pain.  Weight appears to be stable compared to her discharge weight in February.  In the ER, patient afebrile but tachycardic in A-fib with RVR with rates in the 130-150s.  Needing upwards of 5 L overnight to maintain saturations.  -VBG unremarkable.  BNP not significantly elevated.  Lactic acid within normal limits.  High-sensitivity troponin not significantly elevated at 16.  EKG with A-fib with RVR without clear ischemic changes.  Chest x-ray appears similar to previous with no consolidation and possible mild volume overload.    -It looks like patient was discharged on 3 L of oxygen with activity but none at rest.  She also uses oxygen at night while awaiting her CPAP study.  -Patient was initially started on prednisone, DuoNebs, azithromycin along with IV Lasix.  -Suspect patient's A-fib with RVR is the main  here.  Patient was seen by cardiology on 4/24 and ADAMARIS with cardioversion was recommended if she was having RVR with bradycardia and significant pauses at night.  This was planned for 5/24.  -Difficulty with controlling rates given softer BP's.  Diltiazem converted to short acting and had to be down-titrated.  Given dose of digoxin overnight on 5/13-5/14.    -Plan for cardioversion on 5/14.  Appreciate cards assistance with medication management with relation to afib management as well.   -She will continue on her  apixaban.  -Stop IV lasix as do not suspect volume overload is primary  here and BP's soft.    -Switched DuoNebs as needed to Xopenex given A-fib with RVR.  Short course of prednisone and azithro though do not suspect this is primary  of her presentation.       #DESIRE thrombus: DOAC therapy.      #Mild FLAVIA: Cr 1.13 on admission from baseline 0.8.  Improved  #OHS, CATARINO - had telemedicine visit with pulm 5/10, needs polysomnogram.  Only using O2 at 3 L for now.   #Seasonal allergies - PTA cetirizine and flonase   #HLD - PTA atorvastatin     Dispo: Monitor overnight after cardioversion with possible adjustments in cardiac meds.   DVT Prophylaxis: DOAC  Code Status: Full Code    Serjio Steinberg MD    Interval History   BP's soft overnight. No chest pain today. Still having some cough but better.  Planning for cardioversion today.     -Data reviewed today: I reviewed all new labs and imaging results over the last 24 hours. I personally reviewed     Physical Exam   Temp: 97.7  F (36.5  C) Temp src: Oral BP: 99/65 Pulse: 88   Resp: 17 SpO2: 94 % O2 Device: Nasal cannula Oxygen Delivery: 2 LPM  Vitals:    05/12/24 1745 05/14/24 0548   Weight: 129.9 kg (286 lb 6.4 oz) 131.6 kg (290 lb 1.6 oz)     Vital Signs with Ranges  Temp:  [97.7  F (36.5  C)-98.2  F (36.8  C)] 97.7  F (36.5  C)  Pulse:  [] 88  Resp:  [17-20] 17  BP: ()/(27-90) 99/65  SpO2:  [91 %-94 %] 94 %  I/O last 3 completed shifts:  In: 1780 [P.O.:1780]  Out: 750 [Urine:750]    Constitutional: Nontoxic, NAD.  Obese  HEENT: Normocephalic. MMM, cannot appreciate JVD secondary to body habitus  Respiratory: Nl WOB, somewhat prolonged expiratory phase. No crackles.  Cardiovascular: Tachycardic, irregular.  GI: Obese, BS+, NT, ND  Skin/Integumen: WWP, no rash. Mild bilateral edema  Neuro: CNII-XII intact. Moves all extremities. No tremor. A&Ox3.    Medications   Current Facility-Administered Medications   Medication Dose Route Frequency Provider Last  Rate Last Admin    Patient is already receiving anticoagulation with heparin, enoxaparin (LOVENOX), warfarin (COUMADIN)  or other anticoagulant medication   Does not apply Continuous PRN Tena Moura DO         Current Facility-Administered Medications   Medication Dose Route Frequency Provider Last Rate Last Admin    apixaban ANTICOAGULANT (ELIQUIS) tablet 5 mg  5 mg Oral BID Tena Moura DO   5 mg at 05/14/24 0836    atorvastatin (LIPITOR) tablet 10 mg  10 mg Oral QPM Tena Moura DO   10 mg at 05/13/24 2057    cetirizine (zyrTEC) tablet 10 mg  10 mg Oral Daily Tena Moura DO   10 mg at 05/14/24 0836    diltiazem (CARDIZEM) tablet 30 mg  30 mg Oral Q6H [facility] Corina Ardon MD   30 mg at 05/13/24 2342    [Held by provider] furosemide (LASIX) injection 40 mg  40 mg Intravenous BID Serjio Steinberg MD        metoprolol succinate ER (TOPROL XL) 24 hr tablet 25 mg  25 mg Oral QPM Serjio Steinberg MD   25 mg at 05/13/24 2057    predniSONE (DELTASONE) tablet 40 mg  40 mg Oral Daily Serjio Steinberg MD   40 mg at 05/14/24 0836    sodium chloride (PF) 0.9% PF flush 3 mL  3 mL Intracatheter Q8H Tena Moura DO   3 mL at 05/13/24 1832       Data   Recent Labs   Lab 05/14/24  0622 05/13/24  0748 05/12/24  1513   WBC  --  8.5 8.0   HGB  --  12.3 13.7   MCV  --  96 92   PLT  --  210 238   INR 1.25*  --   --     142 145   POTASSIUM 4.4  4.4 4.9 4.3   CHLORIDE 106 106 104   CO2 24 22 25   BUN 33.1* 33.8* 28.3*   CR 0.86 0.94 1.13*   ANIONGAP 10 14 16*   KRISTA 8.7 8.4* 8.7   * 162* 101*       No results found for this or any previous visit (from the past 24 hour(s)).

## 2024-05-14 NOTE — PLAN OF CARE
"  Problem: Adult Inpatient Plan of Care  Goal: Plan of Care Review  Description: The Plan of Care Review/Shift note should be completed every shift.  The Outcome Evaluation is a brief statement about your assessment that the patient is improving, declining, or no change.  This information will be displayed automatically on your shift  note.  Flowsheets (Taken 5/14/2024 1500)  Outcome Evaluation: Pt now in SR post cardioversion.  Per patient report, \"I feel so much better.\" Resting comfortably between cares.  Tolerated water so far post ADAMARIS/cardiversion.  Will advance clears. Metoprolol and lasix stopped per cards.  Plan of Care Reviewed With: patient  Overall Patient Progress: improving     Problem: Comorbidity Management  Goal: Blood Pressure in Desired Range  Intervention: Maintain Blood Pressure Management  Recent Flowsheet Documentation  Taken 5/14/2024 0900 by Luz Elena Sam RN  Medication Review/Management:   medications reviewed   high-risk medications identified     Problem: Dysrhythmia  Goal: Normalized Cardiac Rhythm  Intervention: Monitor and Manage Cardiac Rhythm Effect  Flowsheets  Taken 5/14/2024 1500  Dysrhythmia Management: cardioversion assistance provided  Taken 5/14/2024 0900  Dysrhythmia Management: (ADAMARIS planned) other (see comments)  VTE Prevention/Management: (eliquis) other (see comments)   Goal Outcome Evaluation:met      Plan of Care Reviewed With: patient  Overall Patient Progress: improving  Outcome Evaluation: Pt now in SR post cardioversion.  Per patient report, \"I feel so much better.\" Resting comfortably between cares.  Tolerated water so far post ADAMARIS/cardiversion.  Will advance clears. Metoprolol and lasix stopped per cards.      "

## 2024-05-14 NOTE — PROVIDER NOTIFICATION
Pt current /83, has scheduled Diltiazem ordered with hold parameters of SBP <110. Pt continues to be in Afib -150's. MD paged.    PO Diltiazem dose decreased and orders to give this dose and IV Digoxin x1.

## 2024-05-14 NOTE — PRE-PROCEDURE
GENERAL PRE-PROCEDURE:   Procedure:  ADAMARIS, cardioversion  Date/Time:  5/14/2024 11:23 AM    Verbal consent obtained?: Yes    Written consent obtained?: Yes    Risks and benefits: Risks, benefits and alternatives were discussed    Consent given by:  Patient  Patient states understanding of procedure being performed: Yes    Patient's understanding of procedure matches consent: Yes    Procedure consent matches procedure scheduled: Yes    Expected level of sedation:  Moderate  Appropriately NPO:  Yes  ASA Class:  1  Mallampati  :  Grade 1- soft palate, uvula, tonsillar pillars, and posterior pharyngeal wall visible  Lungs:  Lungs clear with good breath sounds bilaterally  Heart:  RRR  History & Physical reviewed:  History and physical reviewed and no updates needed  Statement of review:  I have reviewed the lab findings, diagnostic data, medications, and the plan for sedation

## 2024-05-14 NOTE — PROGRESS NOTES
Cross Cover    Called for /83 and to receive dilt 60 mg for AF with RVR, dropped systolics into the 80's this am, SBP in the 90's this evening and so evening dilt held.  Still with AF with RVR in the 120-150's but asx    Plans are for ADAMARIS and DCCV tomorrow  Has hx of LA thrombus and has been on AC for months    Would prefer to avoid rhythm controlling agents in this setting    Decreased dilt to 30 mg and ordered one time dose dig 250 mcg IV

## 2024-05-15 VITALS
OXYGEN SATURATION: 97 % | HEART RATE: 68 BPM | SYSTOLIC BLOOD PRESSURE: 113 MMHG | RESPIRATION RATE: 19 BRPM | BODY MASS INDEX: 54.53 KG/M2 | WEIGHT: 288.8 LBS | HEIGHT: 61 IN | DIASTOLIC BLOOD PRESSURE: 62 MMHG | TEMPERATURE: 97.8 F

## 2024-05-15 LAB
ANION GAP SERPL CALCULATED.3IONS-SCNC: 5 MMOL/L (ref 7–15)
BUN SERPL-MCNC: 22.6 MG/DL (ref 6–20)
CALCIUM SERPL-MCNC: 8.5 MG/DL (ref 8.6–10)
CHLORIDE SERPL-SCNC: 107 MMOL/L (ref 98–107)
CREAT SERPL-MCNC: 0.77 MG/DL (ref 0.51–0.95)
DEPRECATED HCO3 PLAS-SCNC: 29 MMOL/L (ref 22–29)
EGFRCR SERPLBLD CKD-EPI 2021: >90 ML/MIN/1.73M2
GLUCOSE SERPL-MCNC: 110 MG/DL (ref 70–99)
POTASSIUM SERPL-SCNC: 5 MMOL/L (ref 3.4–5.3)
SODIUM SERPL-SCNC: 141 MMOL/L (ref 135–145)

## 2024-05-15 PROCEDURE — 80048 BASIC METABOLIC PNL TOTAL CA: CPT | Performed by: NURSE PRACTITIONER

## 2024-05-15 PROCEDURE — 250N000012 HC RX MED GY IP 250 OP 636 PS 637: Performed by: HOSPITALIST

## 2024-05-15 PROCEDURE — 99232 SBSQ HOSP IP/OBS MODERATE 35: CPT | Mod: FS | Performed by: NURSE PRACTITIONER

## 2024-05-15 PROCEDURE — 99239 HOSP IP/OBS DSCHRG MGMT >30: CPT | Performed by: HOSPITALIST

## 2024-05-15 PROCEDURE — 36415 COLL VENOUS BLD VENIPUNCTURE: CPT | Performed by: NURSE PRACTITIONER

## 2024-05-15 PROCEDURE — 250N000013 HC RX MED GY IP 250 OP 250 PS 637: Performed by: STUDENT IN AN ORGANIZED HEALTH CARE EDUCATION/TRAINING PROGRAM

## 2024-05-15 PROCEDURE — 250N000013 HC RX MED GY IP 250 OP 250 PS 637: Performed by: INTERNAL MEDICINE

## 2024-05-15 RX ORDER — DILTIAZEM HYDROCHLORIDE 120 MG/1
120 CAPSULE, COATED, EXTENDED RELEASE ORAL DAILY
Status: DISCONTINUED | OUTPATIENT
Start: 2024-05-15 | End: 2024-05-15 | Stop reason: HOSPADM

## 2024-05-15 RX ORDER — DILTIAZEM HYDROCHLORIDE 120 MG/1
120 CAPSULE, COATED, EXTENDED RELEASE ORAL DAILY
Qty: 30 CAPSULE | Refills: 0 | Status: SHIPPED | OUTPATIENT
Start: 2024-05-15 | End: 2024-06-13

## 2024-05-15 RX ORDER — PREDNISONE 20 MG/1
40 TABLET ORAL DAILY
Qty: 4 TABLET | Refills: 0 | Status: SHIPPED | OUTPATIENT
Start: 2024-05-15 | End: 2024-05-17

## 2024-05-15 RX ORDER — LEVALBUTEROL TARTRATE 45 UG/1
2 AEROSOL, METERED ORAL EVERY 4 HOURS PRN
Qty: 30 G | Refills: 0 | Status: SHIPPED | OUTPATIENT
Start: 2024-05-15 | End: 2024-05-29

## 2024-05-15 RX ADMIN — CETIRIZINE HYDROCHLORIDE 10 MG: 10 TABLET, FILM COATED ORAL at 08:57

## 2024-05-15 RX ADMIN — APIXABAN 5 MG: 5 TABLET, FILM COATED ORAL at 08:57

## 2024-05-15 RX ADMIN — DILTIAZEM HYDROCHLORIDE 30 MG: 30 TABLET, FILM COATED ORAL at 08:57

## 2024-05-15 RX ADMIN — ACETAMINOPHEN 650 MG: 325 TABLET, FILM COATED ORAL at 08:57

## 2024-05-15 RX ADMIN — PREDNISONE 40 MG: 20 TABLET ORAL at 08:57

## 2024-05-15 RX ADMIN — DILTIAZEM HYDROCHLORIDE 30 MG: 30 TABLET, FILM COATED ORAL at 01:52

## 2024-05-15 ASSESSMENT — ACTIVITIES OF DAILY LIVING (ADL)
ADLS_ACUITY_SCORE: 34

## 2024-05-15 NOTE — PLAN OF CARE
"Goal Outcome Evaluation:      Plan of Care Reviewed With: patient    Overall Patient Progress: no changeOverall Patient Progress: no change    Outcome Evaluation: Maintains NSR, occasional SB. Sating Ok on 4L O2 NC.      Problem: Adult Inpatient Plan of Care  Goal: Plan of Care Review  Description: The Plan of Care Review/Shift note should be completed every shift.  The Outcome Evaluation is a brief statement about your assessment that the patient is improving, declining, or no change.  This information will be displayed automatically on your shift  note.  5/15/2024 0638 by Veena Tubbs RN  Outcome: Progressing  Flowsheets (Taken 5/15/2024 0638)  Outcome Evaluation: Maintains NSR, occasional SB. Sating Ok on 4L O2 NC.  Plan of Care Reviewed With: patient  Overall Patient Progress: no change  5/15/2024 0638 by Veena Tubbs RN  Outcome: Progressing  Flowsheets (Taken 5/15/2024 0638)  Outcome Evaluation: Maintains NSR, occasional SB. Sating Ok on 4L O2 NC.  Plan of Care Reviewed With: patient  Overall Patient Progress: no change  Goal: Patient-Specific Goal (Individualized)  Description: You can add care plan individualizations to a care plan. Examples of Individualization might be:  \"Parent requests to be called daily at 9am for status\", \"I have a hard time hearing out of my right ear\", or \"Do not touch me to wake me up as it startles  me\".  5/15/2024 0638 by Veena Tubbs RN  Outcome: Progressing  5/15/2024 0638 by Veena Tubbs RN  Outcome: Progressing  Goal: Absence of Hospital-Acquired Illness or Injury  5/15/2024 0638 by Veena Tubbs RN  Outcome: Progressing  5/15/2024 0638 by Veena Tubbs RN  Outcome: Progressing  Goal: Optimal Comfort and Wellbeing  5/15/2024 0638 by Veena Tubbs RN  Outcome: Progressing  5/15/2024 0638 by Veena Tubbs RN  Outcome: Progressing  Goal: Readiness for Transition of Care  5/15/2024 0638 by Veena Tubbs RN  Outcome: Progressing  5/15/2024 0638 by Veena Tubbs, " RN  Outcome: Progressing     Problem: Comorbidity Management  Goal: Maintenance of Heart Failure Symptom Control  5/15/2024 0638 by Veena Tubbs RN  Outcome: Progressing  5/15/2024 0638 by Veena Tubbs RN  Outcome: Progressing  Goal: Blood Pressure in Desired Range  5/15/2024 0638 by Veena Tubbs RN  Outcome: Progressing  5/15/2024 0638 by Veena Tubbs RN  Outcome: Progressing     Problem: Fall Injury Risk  Goal: Absence of Fall and Fall-Related Injury  5/15/2024 0638 by Veena Tubbs RN  Outcome: Progressing  5/15/2024 0638 by Veena Tubbs RN  Outcome: Progressing     Problem: Dysrhythmia  Goal: Normalized Cardiac Rhythm  5/15/2024 0638 by Veena Tubbs RN  Outcome: Progressing  5/15/2024 0638 by Veena Tubbs RN  Outcome: Progressing

## 2024-05-15 NOTE — DISCHARGE SUMMARY
[facility]    Discharge Summary  Hospitalist    Date of Admission:  5/12/2024  Date of Discharge:  5/15/2024  Discharging Provider: Serjio Steinberg MD  Date of Service (when I saw the patient): 05/15/24    Discharge Diagnoses   #SPRING and Acute on chronic hypoxemic respiratory failure secondary to acute on chronic HFpEF,   #Afib w RVR  #Possible exacerbation of reactive airway disease  #History of DESIRE thrombus  #Mild FLAVIA  #OHS, CATARINO  #Seasonal allergies  #HLD    Hospital Course   Shilpa Miramontes is a 54 year old female with PMH of HFpEF, mild/moderate aortic stenosis, OHS, CATARINO, Afib, possible COPD, HLD, pre-DM, who was admitted on 5/12/2024 with SOB and cough.      #SPRING and Acute on chronic hypoxemic respiratory failure secondary to acute on chronic HFpEF. Afib w RVR.  Less likely COPD exaceberation: Presenting with 4 days of worsening dyspnea and worsening productive cough. Endorses dietary indiscretion, missed medications, and worsening allergies.  No clear chest pain.  Weight appears to be stable compared to her discharge weight in February.  In the ER, patient afebrile but tachycardic in A-fib with RVR with rates in the 130-150s.  Needing upwards of 5 L overnight to maintain saturations.  -VBG unremarkable.  BNP not significantly elevated.  Lactic acid within normal limits.  High-sensitivity troponin not significantly elevated at 16.  EKG with A-fib with RVR without clear ischemic changes.  Chest x-ray appears similar to previous with no consolidation and possible mild volume overload.    -It looks like patient was discharged on 3 L of oxygen with activity but none at rest.  She also uses oxygen at night while awaiting her CPAP study.  -Patient was initially started on prednisone, DuoNebs, azithromycin along with IV Lasix.  -Suspect patient's A-fib with RVR is the main  here.  Patient was seen by cardiology on 4/24 and ADAMARIS with cardioversion was recommended if she was having RVR with  bradycardia and significant pauses at night.  This was planned for 5/24.  -There was difficulty in controlling heart rates given softer blood pressures with diltiazem.  She did get a dose of digoxin which did seem to help.  Ultimately, she underwent cardioversion on 5/14 and was maintained in sinus rhythm.  Symptoms also seem to improve at that point.  -She we will discharge on diltiazem 120 mg daily.  Stop metoprolol.  She will continue on her daily Lasix.  She will follow-up with cardiology in the coming weeks with a repeat TTE and EKG.  -She will complete a short course of prednisone on discharge for total 5-day course.  She will also switch from albuterol to Xopenex as needed given her A-fib with RVR.  -She will continue on her apixaban.  -At the time of discharge, patient was saturating okay on room air when at rest and when awake.  When she does fall asleep she does desaturate.  She ultimately will need a sleep study which is in the near future per patient.  She already has home oxygen which she uses 3 L at night.  He also does use it with activity.  She can continue this.  She felt ready to discharge home and felt much better on 5/15.     #DESIRE thrombus: DOAC therapy.      #Mild FLAVIA: Cr 1.13 on admission from baseline 0.8.  Improved  #OHS, CATARINO - had telemedicine visit with pulm 5/10, needs polysomnogram.  Only using O2 at 3 L for now.   #Seasonal allergies - PTA cetirizine and flonase   #HLD - PTA atorvastatin    Serjio Steinberg MD    Code Status   Full Code       Primary Care Physician   [facility]    Physical Exam   Temp: 97.8  F (36.6  C) Temp src: Oral BP: 113/62 Pulse: 68   Resp: 19 SpO2: 97 % O2 Device: Nasal cannula Oxygen Delivery: 4 LPM  Vitals:    05/12/24 1745 05/14/24 0548 05/15/24 0510   Weight: 129.9 kg (286 lb 6.4 oz) 131.6 kg (290 lb 1.6 oz) 131 kg (288 lb 12.8 oz)     Vital Signs with Ranges  Temp:  [97.8  F (36.6  C)-98.5  F (36.9  C)] 97.8  F (36.6  C)  Pulse:  [64-78]  68  Resp:  [18-22] 19  BP: ()/() 113/62  SpO2:  [91 %-97 %] 97 %  I/O last 3 completed shifts:  In: 244 [P.O.:244]  Out: 1100 [Urine:1100]    Constitutional: Nontoxic, NAD.  Obese  HEENT: Normocephalic. MMM, cannot appreciate JVD secondary to body habitus  Respiratory: Nl WOB, somewhat prolonged expiratory phase but no wheeze.  No crackles.  Cardiovascular: Regular.  No murmur.  GI: Obese, BS+, NT, ND  Skin/Integumen: WWP, no rash. Mild bilateral edema  Neuro: CNII-XII intact. Moves all extremities. No tremor. A&Ox3.    Discharge Disposition   Discharged to home  Condition at discharge: Stable    Consultations This Hospital Stay   CARDIOLOGY IP CONSULT    Time Spent on this Encounter   I, Serjio Steinberg MD, personally saw the patient today and spent greater than 30 minutes discharging this patient.    Discharge Orders      EKG 12-lead complete w/read  (to be scheduled)     Reason for your hospital stay    You were hospitalized for afib with RVR and possible reactive airway exacerbation.  You were treated with steroids and nebuilizer breathing treatments.  You were seen by cardiology and underwent cardioversion for your atrial fibrillation.      You will discharge home with xopenex inhaler which has less impact on your heart.  You will complete short course of steroids.  You will continue your home oxygen at 3L at night.  It is critical you have your outpatient sleep study for treatment of obstructive sleep apnea.     Activity    Your activity upon discharge: activity as tolerated     Follow-up and recommended labs and tests     Follow up with primary care provider, [facility], within 7 days for hospital follow- up.  The following labs/tests are recommended: CBC and BMP.    Follow up with cardiology per their instructions for atrial fibrillation.  Repeat EKG and echocardiogram prior to your appointment.     Full Code     Echocardiogram Complete    Administration of IV contrast will be  tailored to this examination per the appropriate written protocol listed in the Echocardiography department Protocol Book, or by the supervising Cardiologist. This may result in an order change.    Use of contrast is at the discretion of the supervising Cardiologist.     Diet    Follow this diet upon discharge: Low salt diet     Discharge Medications   Current Discharge Medication List        START taking these medications    Details   levalbuterol (XOPENEX HFA) 45 MCG/ACT inhaler Inhale 2 puffs into the lungs every 4 hours as needed for shortness of breath or wheezing  Qty: 30 g, Refills: 0    Associated Diagnoses: Chronic obstructive pulmonary disease, unspecified COPD type (H)      predniSONE (DELTASONE) 20 MG tablet Take 2 tablets (40 mg) by mouth daily for 2 days  Qty: 4 tablet, Refills: 0    Associated Diagnoses: Chronic obstructive pulmonary disease, unspecified COPD type (H)           CONTINUE these medications which have CHANGED    Details   diltiazem ER COATED BEADS (CARDIZEM CD/CARTIA XT) 120 MG 24 hr capsule Take 1 capsule (120 mg) by mouth daily for 30 days  Qty: 30 capsule, Refills: 0    Associated Diagnoses: Atrial fibrillation with RVR (H)           CONTINUE these medications which have NOT CHANGED    Details   apixaban ANTICOAGULANT (ELIQUIS) 5 MG tablet Take 1 tablet (5 mg) by mouth 2 times daily  Qty: 180 tablet, Refills: 0    Associated Diagnoses: Atrial fibrillation with RVR (H); Thrombus of left atrial appendage; CATARINO (obstructive sleep apnea)      atorvastatin (LIPITOR) 10 MG tablet Take 1 tablet (10 mg) by mouth every evening  Qty: 90 tablet, Refills: 3    Associated Diagnoses: Thrombus of left atrial appendage; CATARINO (obstructive sleep apnea)      cetirizine (ZYRTEC) 10 MG tablet Take 1 tablet (10 mg) by mouth daily  Qty: 30 tablet, Refills: 0    Associated Diagnoses: Atrial fibrillation with RVR (H); Thrombus of left atrial appendage; CATARINO (obstructive sleep apnea)      fluticasone (FLONASE) 50  MCG/ACT nasal spray Spray 1 spray into both nostrils daily as needed for rhinitis or allergies      furosemide (LASIX) 40 MG tablet Take 1 tablet (40 mg) by mouth daily  Qty: 60 tablet, Refills: 0    Associated Diagnoses: Atrial fibrillation with RVR (H); Thrombus of left atrial appendage; CATARINO (obstructive sleep apnea)           STOP taking these medications       albuterol (VENTOLIN HFA) 108 (90 Base) MCG/ACT inhaler Comments:   Reason for Stopping:         metoprolol succinate ER (TOPROL XL) 25 MG 24 hr tablet Comments:   Reason for Stopping:             Allergies   Allergies   Allergen Reactions    Fluoxetine Rash    Morphine Rash    Percocet [Oxycodone-Acetaminophen] Rash     Data   Most Recent 3 CBC's:  Recent Labs   Lab Test 05/13/24  0748 05/12/24  1513 03/08/24  1516   WBC 8.5 8.0 7.8   HGB 12.3 13.7 13.8   MCV 96 92 91    238 235      Most Recent 3 BMP's:  Recent Labs   Lab Test 05/15/24  1123 05/14/24  0622 05/13/24  0748    140 142   POTASSIUM 5.0 4.4  4.4 4.9   CHLORIDE 107 106 106   CO2 29 24 22   BUN 22.6* 33.1* 33.8*   CR 0.77 0.86 0.94   ANIONGAP 5* 10 14   KRISTA 8.5* 8.7 8.4*   * 125* 162*     Most Recent 2 LFT's:  Recent Labs   Lab Test 02/15/24  0636 02/14/24  2212   AST 30 33   ALT 64* 72*   ALKPHOS 117 121   BILITOTAL 0.3 0.3     Most Recent INR's and Anticoagulation Dosing History:  Anticoagulation Dose History          Latest Ref Rng & Units 2/15/2024 5/14/2024   Recent Dosing and Labs   INR 0.85 - 1.15 1.06  1.25      Most Recent 3 Troponin's:No lab results found.  Most Recent Cholesterol Panel:  Recent Labs   Lab Test 02/15/24  0636   CHOL 145   LDL 87   HDL 36*   TRIG 108     Most Recent 6 Bacteria Isolates From Any Culture (See EPIC Reports for Culture Details):No lab results found.  Most Recent TSH, T4 and A1c Labs:  Recent Labs   Lab Test 02/15/24  0636 02/14/24  1619   TSH  --  2.52   A1C 6.2*  --

## 2024-05-15 NOTE — PROGRESS NOTES
"Cardiology Progress Note  Sera Quach APRN, CNP     Follows with Dr Wick       Assessment and Plan:     Admit (5/12/24)  with 4 days worsening SOB    Evidence of rapid A-fib, acute HFpEF exacerbation  Recent hospitalization for similar scenario, and cardioversion was not done due to evidence of left atrial thrombus (2/2024)    PMH:  Paroxysmal Atrial fibrillation, undiagnosed COPD, chronic HFpEF, morbid obesity, untreated sleep apnea, former smoker, hypertension, hyperlipidemia, aortic stenosis    Persistent A-Fib with RVR  -first noted (2/2024), with initial rate controlled planned due to LA thrombus  -has significant bradycardia with high doses of negative chronotropic agents  -s/p ADAMARIS/DCCV (5/14/24) with successful restoration of SR  No thrombus  Remains in SR overnight  -will change to long-acting Diltiazem  Off beta-blocker due to wheezing/COPD  -follow up with EKG in 2 wks  -chronic Eliquis  (CHADS2 vasc score: 5)    Acute on chronic HFmrEF  -BNP: 1300 / mild pulmonary vascular congestion  -LVEF 40% per ADAMARIS in setting of rapid A-fib  (was 55% in Feb)  -etiology likely tachy-mediated and recent dietary indiscretion  -little improvement with IV Lasix  -weight unchanged / I=O  -recommend repeating TTE in 1 month and follow up as planned with Nuzhat Bermudez NP (6/3/24)      COPD, possible exacerbation  -recently started on O2 support for home use  -Duonebs, inhaler, short course of steroids, and abx    Presumed Sleep Apnea  -upcoming sleep study    Morbid Obesity               Interval History:       \"I feel much better back in regular rhythm\"  No palpitations or chest pain Requiring O2 at 4L for support  breathing improves with Nebs                   Medications:     Current Facility-Administered Medications   Medication Dose Route Frequency Provider Last Rate Last Admin    apixaban ANTICOAGULANT (ELIQUIS) tablet 5 mg  5 mg Oral BID Tena Moura DO   5 mg at 05/15/24 0857    atorvastatin (LIPITOR) tablet " "10 mg  10 mg Oral QPM Tena Moura DO   10 mg at 05/14/24 2037    cetirizine (zyrTEC) tablet 10 mg  10 mg Oral Daily Tena Moura DO   10 mg at 05/15/24 0857    diltiazem (CARDIZEM) tablet 30 mg  30 mg Oral Q6H Corina Warren MD   30 mg at 05/15/24 0857    fentaNYL (PF) (SUBLIMAZE) injection 50 mcg  50 mcg Intravenous Once Luther Dc MD        predniSONE (DELTASONE) tablet 40 mg  40 mg Oral Daily Serjio Steinberg MD   40 mg at 05/15/24 0857    sodium chloride (PF) 0.9% PF flush 3 mL  3 mL Intracatheter Q8H Tena Moura DO   3 mL at 05/15/24 0133            Physical Exam:   Blood pressure 113/62, pulse 68, temperature 97.8  F (36.6  C), temperature source Oral, resp. rate 19, height 1.549 m (5' 1\"), weight 131 kg (288 lb 12.8 oz), last menstrual period 12/14/2020, SpO2 97%.  Wt Readings from Last 3 Encounters:   05/15/24 131 kg (288 lb 12.8 oz)   04/24/24 131.5 kg (289 lb 14.4 oz)   03/07/24 136.6 kg (301 lb 1.6 oz)     I/O last 3 completed shifts:  In: 244 [P.O.:244]  Out: 1100 [Urine:1100]    CONST:  alert and oriented  LUNGS:  scattered expiratory wheezing  CARDIO:  RRR I/VI systolic murmur  ABD:  obese  EXT:  no edema           Data:   TELE:  SR    CBC  Recent Labs   Lab 05/13/24  0748 05/12/24  1513   WBC 8.5 8.0   HGB 12.3 13.7    238       BMP  Recent Labs   Lab 05/14/24  0622 05/13/24  0748 05/12/24  1513    142 145   POTASSIUM 4.4  4.4 4.9 4.3   CHLORIDE 106 106 104   KRISTA 8.7 8.4* 8.7   CO2 24 22 25   BUN 33.1* 33.8* 28.3*   CR 0.86 0.94 1.13*   * 162* 101*     Recent Labs   Lab Test 02/15/24  0636   CHOL 145   HDL 36*   LDL 87   TRIG 108       TROP  No results found for: \"TROPI\", \"TROPONIN\", \"TROPR\"    BNP  Recent Labs   Lab 05/12/24  1513   NTBNPI 1,302*     Recent Results (from the past 4320 hour(s))   Transesophageal Echocardiogram   Result Value    LVEF  40%    Narrative    [id number]  [facility]  [id number]  [id number]^JAMIE^LYLY^YEVGENIY ARELLANO   "   [facility]  Echocardiography Laboratory  [address] [place] [address]     Name: CANDI PATEL  MRN: [id number]  : 1970  Study Date: 2024 11:18 AM  Age: 54 yrs  Gender: Female  Patient Location: [facility]  Reason For Study: Afib  Ordering Physician: LYLY AYALA  Performed By: Edel Neri     HR: 129  BP: 116/67 mmHg  ______________________________________________________________________________  Procedure  Complete ADAMARIS Adult. Scope:[id number].  ______________________________________________________________________________  Interpretation Summary     1. The left ventricle is normal in size. The visual ejection fraction is  estimated at 40%. Difficult to assess EF with rapid afib.  2. The right ventricle is normal size. The right ventricular systolic function  is mild to moderately reduced.  3. There is moderate (2+) mitral regurgitation.  4. There is moderate (2+) tricuspid regurgitation.  5. There is mild to moderate (1-2+) aortic regurgitation.     Echo 2024 showed EF 55%, 1-2+ MR  ______________________________________________________________________________  ADAMARIS  I determined this patient to be an appropriate candidate for the planned  sedation and procedure and have reassessed the patient immediately prior to  sedation and procedure. Total sedation time: 9 minutes of continuous bedside  1:1 monitoring. Versed (2.5mg) was given intravenously. Fentanyl (50mcg) was  given intravenously. Consent to the procedure was obtained prior to sedation.  The transesophageal probe was passed without difficulty. There were no  complications associated with this procedure.     Left Ventricle  The left ventricle is normal in size. The visual ejection fraction is  estimated at 40%. Difficult to assess EF with rapid afib.     Right Ventricle  The right ventricle is normal size. The right ventricular systolic function is  mild to moderately reduced.     Atria  No thrombus is  detected in the left atrial appendage. Some smoke in the LA  appendage, but no filling defect seen when contrast given.     Mitral Valve  There is moderate (2+) mitral regurgitation.     Tricuspid Valve  There is moderate (2+) tricuspid regurgitation.     Aortic Valve  There is mild to moderate (1-2+) aortic regurgitation.     Pericardial/Pleural  There is no pericardial effusion.     Rhythm  The rhythm was rapid atrial fibrillation.     ______________________________________________________________________________  Report approved by: Tanvir Ferrer 05/14/2024 02:26 PM     ______________________________________________________________________________

## 2024-05-15 NOTE — PLAN OF CARE
"  Problem: Adult Inpatient Plan of Care  Goal: Plan of Care Review  Description: The Plan of Care Review/Shift note should be completed every shift.  The Outcome Evaluation is a brief statement about your assessment that the patient is improving, declining, or no change.  This information will be displayed automatically on your shift  note.  Outcome: Progressing  Flowsheets (Taken 5/15/2024 0006)  Outcome Evaluation: Tele continued to be in SR. Patient sttill maintatining NSR . denied pain and distress. Up independently in the room. Possible discharge to home tomorrow.  Plan of Care Reviewed With: patient  Goal: Patient-Specific Goal (Individualized)  Description: You can add care plan individualizations to a care plan. Examples of Individualization might be:  \"Parent requests to be called daily at 9am for status\", \"I have a hard time hearing out of my right ear\", or \"Do not touch me to wake me up as it startles  me\".  Outcome: Progressing  Goal: Absence of Hospital-Acquired Illness or Injury  Outcome: Progressing  Goal: Optimal Comfort and Wellbeing  Outcome: Progressing  Goal: Readiness for Transition of Care  Outcome: Progressing   Goal Outcome Evaluation:      Plan of Care Reviewed With: patient          Outcome Evaluation: Tele continued to be in SR. Patient sttill maintatining NSR . denied pain and distress. Up independently in the room. Possible discharge to home tomorrow.      "

## 2024-05-15 NOTE — PLAN OF CARE
"Goal Outcome Evaluation:      Plan of Care Reviewed With: patient, spouse    Overall Patient Progress: improvingOverall Patient Progress: improving    Outcome Evaluation: SR. SOB with up to bathroom and neb needed. Can now tolerate regular diet and advanced to that.      Problem: Adult Inpatient Plan of Care  Goal: Plan of Care Review  Description: The Plan of Care Review/Shift note should be completed every shift.  The Outcome Evaluation is a brief statement about your assessment that the patient is improving, declining, or no change.  This information will be displayed automatically on your shift  note.  Outcome: Progressing  Flowsheets (Taken 5/14/2024 1931)  Outcome Evaluation: SR. SOB with up to bathroom and neb needed. Can now tolerate regular diet and advanced to that.  Plan of Care Reviewed With:   patient   spouse  Overall Patient Progress: improving  Goal: Patient-Specific Goal (Individualized)  Description: You can add care plan individualizations to a care plan. Examples of Individualization might be:  \"Parent requests to be called daily at 9am for status\", \"I have a hard time hearing out of my right ear\", or \"Do not touch me to wake me up as it startles  me\".  Outcome: Progressing  Goal: Absence of Hospital-Acquired Illness or Injury  Outcome: Progressing  Intervention: Identify and Manage Fall Risk  Recent Flowsheet Documentation  Taken 5/14/2024 1912 by Luz Elena Acevedo RN  Safety Promotion/Fall Prevention:   activity supervised   clutter free environment maintained   increased rounding and observation   increase visualization of patient   nonskid shoes/slippers when out of bed   patient and family education   room organization consistent   safety round/check completed   supervised activity   treat reversible contributory factors   treat underlying cause  Intervention: Prevent Skin Injury  Recent Flowsheet Documentation  Taken 5/14/2024 1912 by Luz Elena Acevedo RN  Body Position: position " changed independently  Skin Protection:   adhesive use limited   transparent dressing maintained  Device Skin Pressure Protection:   tubing/devices free from skin contact   adhesive use limited  Intervention: Prevent and Manage VTE (Venous Thromboembolism) Risk  Recent Flowsheet Documentation  Taken 5/14/2024 1912 by Luz Elena Acevedo RN  VTE Prevention/Management: (eliquis) other (see comments)  Intervention: Prevent Infection  Recent Flowsheet Documentation  Taken 5/14/2024 1912 by Luz Elena Acevedo RN  Infection Prevention:   environmental surveillance performed   hand hygiene promoted   single patient room provided  Goal: Optimal Comfort and Wellbeing  Outcome: Progressing  Goal: Readiness for Transition of Care  Outcome: Progressing     Problem: Comorbidity Management  Goal: Maintenance of Heart Failure Symptom Control  Outcome: Progressing  Intervention: Maintain Heart Failure Management  Recent Flowsheet Documentation  Taken 5/14/2024 1912 by Luz Elena Acevedo RN  Medication Review/Management:   medications reviewed   high-risk medications identified  Goal: Blood Pressure in Desired Range  Outcome: Progressing  Intervention: Maintain Blood Pressure Management  Recent Flowsheet Documentation  Taken 5/14/2024 1912 by Luz Elena Acevedo RN  Medication Review/Management:   medications reviewed   high-risk medications identified     Problem: Fall Injury Risk  Goal: Absence of Fall and Fall-Related Injury  Outcome: Progressing  Intervention: Identify and Manage Contributors  Recent Flowsheet Documentation  Taken 5/14/2024 1912 by Luz Elena Acevedo RN  Self-Care Promotion:   independence encouraged   BADL personal objects within reach  Medication Review/Management:   medications reviewed   high-risk medications identified  Intervention: Promote Injury-Free Environment  Recent Flowsheet Documentation  Taken 5/14/2024 1912 by Kristina, Luz Elena S, RN  Safety Promotion/Fall Prevention:   activity  supervised   clutter free environment maintained   increased rounding and observation   increase visualization of patient   nonskid shoes/slippers when out of bed   patient and family education   room organization consistent   safety round/check completed   supervised activity   treat reversible contributory factors   treat underlying cause     Problem: Dysrhythmia  Goal: Normalized Cardiac Rhythm  Outcome: Progressing  Intervention: Monitor and Manage Cardiac Rhythm Effect  Recent Flowsheet Documentation  Taken 5/14/2024 1912 by Luz Elena Acevedo RN  Dysrhythmia Management: (ADAMARIS planned) other (see comments)  VTE Prevention/Management: (eliquis) other (see comments)

## 2024-05-16 LAB
ATRIAL RATE - MUSE: 74 BPM
DIASTOLIC BLOOD PRESSURE - MUSE: NORMAL MMHG
INTERPRETATION ECG - MUSE: NORMAL
P AXIS - MUSE: 67 DEGREES
PR INTERVAL - MUSE: 142 MS
QRS DURATION - MUSE: 78 MS
QT - MUSE: 360 MS
QTC - MUSE: 399 MS
R AXIS - MUSE: 84 DEGREES
SYSTOLIC BLOOD PRESSURE - MUSE: NORMAL MMHG
T AXIS - MUSE: 66 DEGREES
VENTRICULAR RATE- MUSE: 74 BPM

## 2024-05-17 ENCOUNTER — TELEPHONE (OUTPATIENT)
Dept: CARDIOLOGY | Facility: CLINIC | Age: 54
End: 2024-05-17
Payer: COMMERCIAL

## 2024-05-17 ENCOUNTER — PATIENT OUTREACH (OUTPATIENT)
Dept: CARE COORDINATION | Facility: CLINIC | Age: 54
End: 2024-05-17
Payer: COMMERCIAL

## 2024-05-17 NOTE — TELEPHONE ENCOUNTER
Patient was admitted to [facility] on 5/12/24 with 4 days worsening SOB. Evidence of rapid A-fib, acute HFpEF exacerbation. Recent hospitalization for similar scenario, and cardioversion was not done due to evidence of left atrial thrombus (2/2024).     PMH:  Paroxysmal Atrial fibrillation, undiagnosed COPD, chronic HFpEF, morbid obesity, untreated sleep apnea, former smoker, hypertension, hyperlipidemia, aortic stenosis.    5/14/24: ADAMARIS with successful DCCV to NSR after shock x two. No thrombus. EF 40%. Remained in NSR overnight.    Off beta-blocker due to wheezing/COPD.    IV Lasix diuresed.    Pt was started on Xopenex and Prednisone. PTA Diltiazem dosage was decreased. Eliquis continued. Metoprolol was discontinued at time of discharge.    Called patient to discuss any post hospital d/c questions she may have, review medication changes, and confirm f/u appts. Patient denied any questions regarding new medications or changes to PTA medications.     Patient denied any increased SOB, any chest pain, edema or light headedness.    RN confirmed with patient that she is scheduled for an OV on 7/3/24 at 1225 with DARNELL Nuzhat Bermudez at our [place] Office. Echo needs to be scheduled.    Patient advised to call clinic with any cardiac related questions or concerns prior to this darnell't. Patient verbalized understanding and agreed with plan. DAMIAN Lam RN.

## 2024-05-17 NOTE — PROGRESS NOTES
[facility]: Transitions of Care Outreach  Chief Complaint   Patient presents with    Clinic Care Coordination - Post Hospital       Most Recent Admission Date: 5/12/2024   Most Recent Admission Diagnosis: Atrial fibrillation with RVR (H) - I48.91  Acute on chronic respiratory failure with hypoxemia (H) - J96.21  Chronic obstructive pulmonary disease, unspecified COPD type (H) - J44.9     Most Recent Discharge Date: 5/15/2024   Most Recent Discharge Diagnosis: Acute on chronic respiratory failure with hypoxemia (H) - J96.21  Atrial fibrillation with RVR (H) - I48.91  Chronic obstructive pulmonary disease, unspecified COPD type (H) - J44.9  Atypical chest pain - R07.89     Transitions of Care Assessment    Discharge Assessment  How are you doing now that you are home?: Feeling pretty good, breathing is a lot better, getting around okay, body feels calmer.  Feels well.  How are your symptoms? (Red Flag symptoms escalate to triage hotline per guidelines): Improved  Do you know how to contact your clinic care team if you have future questions or changes to your health status? : Yes  Does the patient have their discharge instructions? : Yes  Does the patient have questions regarding their discharge instructions? : No  Were you started on any new medications or were there changes to any of your previous medications? : Yes  Does the patient have all of their medications?: Yes  Do you have questions regarding any of your medications? : No  Do you have all of your needed medical supplies or equipment (DME)?  (i.e. oxygen tank, CPAP, cane, etc.): Yes         Post-op (Clinicians Only)  Did the patient have surgery or a procedure: Yes (Cardioversion)  Fever: No  Chills: No  Eating & Drinking: eating and drinking without complaints/concerns  PO Intake: other (Low salt)  Additional Symptoms: decreased appetite  Bowel Function: normal  Date of last BM: 05/17/24  Urinary Status: voiding without  complaint/concerns    Follow up Plan     Discharge Follow-Up  Discharge follow up appointment scheduled in alignment with recommended follow up timeframe or Transitions of Risk Category? (Low = within 30 days; Moderate= within 14 days; High= within 7 days): No  Patient's follow up appointment not scheduled: Patient accepted scheduling support. Call transferred to scheduling team    Future Appointments   Date Time Provider Department Center   6/12/2024  1:00 PM Efrain Starks MD [facility] SLEEP   7/3/2024 12:30 PM Nuzhat Bermudez APRN CNP [facility]       Outpatient Plan as outlined on AVS reviewed with patient.    For any urgent concerns, please contact our 24 hour nurse triage line: [phone] ([phone])       Radha Moreno RN

## 2024-05-20 DIAGNOSIS — I51.3 THROMBUS OF LEFT ATRIAL APPENDAGE: ICD-10-CM

## 2024-05-20 DIAGNOSIS — G47.33 OSA (OBSTRUCTIVE SLEEP APNEA): ICD-10-CM

## 2024-05-20 DIAGNOSIS — I48.91 ATRIAL FIBRILLATION WITH RVR (H): ICD-10-CM

## 2024-05-20 RX ORDER — FUROSEMIDE 40 MG
40 TABLET ORAL DAILY
Qty: 90 TABLET | Refills: 0 | Status: SHIPPED | OUTPATIENT
Start: 2024-05-20 | End: 2024-08-19

## 2024-05-23 ENCOUNTER — TELEPHONE (OUTPATIENT)
Dept: CARDIOLOGY | Facility: CLINIC | Age: 54
End: 2024-05-23
Payer: COMMERCIAL

## 2024-05-23 NOTE — TELEPHONE ENCOUNTER
RN returned patient's call and RN reviewed with her the medication list. Patient verbalized understanding and had no further questions at this time.

## 2024-05-23 NOTE — TELEPHONE ENCOUNTER
Health Call Center    Phone Message    May a detailed message be left on voicemail: yes     Reason for Call: Medication Question or concern regarding medication   Prescription Clarification  Name of Medication: All medication review   Prescribing Provider: Heart medications    Pharmacy:    What on the order needs clarification? Patient would like care team to review all medication. Patient wants to know if any should change since she had the procedure. Please reach out. Thank you       Action Taken: Other: cardiology     Travel Screening: Not Applicable             Thank you!  [facility]

## 2024-05-30 ENCOUNTER — TELEPHONE (OUTPATIENT)
Dept: CARDIOLOGY | Facility: CLINIC | Age: 54
End: 2024-05-30
Payer: COMMERCIAL

## 2024-05-30 DIAGNOSIS — I48.91 ATRIAL FIBRILLATION WITH RVR (H): ICD-10-CM

## 2024-05-30 DIAGNOSIS — G47.33 OSA (OBSTRUCTIVE SLEEP APNEA): ICD-10-CM

## 2024-05-30 DIAGNOSIS — I51.3 THROMBUS OF LEFT ATRIAL APPENDAGE: ICD-10-CM

## 2024-05-30 NOTE — TELEPHONE ENCOUNTER
[facility]    Phone Message    May a detailed message be left on voicemail: yes     Reason for Call: Medication Refill Request    Has the patient contacted the pharmacy for the refill? Yes   Name of medication being requested:   apixaban ANTICOAGULANT (ELIQUIS) 5 MG tablet   Provider who prescribed the medication: Aidan  Pharmacy:    [id number] - [place], [place] - [address] [place] [address] [address] [facility] OF [facility] [address]   Date medication is needed: 05/30/24    **Shilpa is completely out.  States there was a discrepancy between her insurance and the pharmacy and now the pharmacy is requesting a call to authorize this refill**    Action Taken: Other: Cardiology    Travel Screening: Not Applicable    Thank you!  [facility]

## 2024-06-13 ENCOUNTER — TELEPHONE (OUTPATIENT)
Dept: CARDIOLOGY | Facility: CLINIC | Age: 54
End: 2024-06-13
Payer: COMMERCIAL

## 2024-06-13 DIAGNOSIS — I48.91 ATRIAL FIBRILLATION WITH RVR (H): ICD-10-CM

## 2024-06-13 RX ORDER — DILTIAZEM HYDROCHLORIDE 120 MG/1
120 CAPSULE, COATED, EXTENDED RELEASE ORAL DAILY
Qty: 30 CAPSULE | Refills: 0 | Status: SHIPPED | OUTPATIENT
Start: 2024-06-13 | End: 2024-07-03

## 2024-06-13 NOTE — TELEPHONE ENCOUNTER
Refilled diltiazem and patient has an appt with Nuzhat MAKI on 7-3-24 and can go over the other refills at that time. Estelle Sun RN on 6/13/2024 at 1:21 PM

## 2024-06-13 NOTE — TELEPHONE ENCOUNTER
[facility] Call Center    Phone Message    May a detailed message be left on voicemail: yes     Reason for Call: Medication Refill Request    Has the patient contacted the pharmacy for the refill? Yes   Name of medication being requested: diltiazem ER COATED BEADS (CARDIZEM CD/CARTIA XT) 120 MG 24 hr capsule [[id number]] (Order [id number])   Provider who prescribed the medication: Aidan   Pharmacy:      [facility] #[id number] - [place], [place] - [address] [place] [address] [address] [facility] OF [facility] [address]     Date medication is needed: ASAP     PCP wants to know if Aidan will take over all cardiac related medications as these medications are spread across several doctors at the moment.      Action Taken: Other: cardiology     Travel Screening: Not Applicable     Thank you!  [facility]

## 2024-07-03 ENCOUNTER — OFFICE VISIT (OUTPATIENT)
Dept: CARDIOLOGY | Facility: CLINIC | Age: 54
End: 2024-07-03
Payer: COMMERCIAL

## 2024-07-03 ENCOUNTER — LAB (OUTPATIENT)
Dept: LAB | Facility: CLINIC | Age: 54
End: 2024-07-03
Payer: COMMERCIAL

## 2024-07-03 VITALS
WEIGHT: 286.3 LBS | OXYGEN SATURATION: 96 % | DIASTOLIC BLOOD PRESSURE: 77 MMHG | HEART RATE: 83 BPM | BODY MASS INDEX: 54.05 KG/M2 | HEIGHT: 61 IN | SYSTOLIC BLOOD PRESSURE: 119 MMHG

## 2024-07-03 DIAGNOSIS — I48.0 PAROXYSMAL ATRIAL FIBRILLATION (H): Primary | ICD-10-CM

## 2024-07-03 DIAGNOSIS — E78.5 HYPERLIPIDEMIA LDL GOAL <100: ICD-10-CM

## 2024-07-03 DIAGNOSIS — G47.33 OSA (OBSTRUCTIVE SLEEP APNEA): ICD-10-CM

## 2024-07-03 DIAGNOSIS — I48.91 ATRIAL FIBRILLATION WITH RVR (H): ICD-10-CM

## 2024-07-03 DIAGNOSIS — E66.01 MORBID OBESITY (H): ICD-10-CM

## 2024-07-03 DIAGNOSIS — I51.3 THROMBUS OF LEFT ATRIAL APPENDAGE: ICD-10-CM

## 2024-07-03 DIAGNOSIS — I50.31 ACUTE DIASTOLIC HEART FAILURE (H): ICD-10-CM

## 2024-07-03 DIAGNOSIS — I48.19 PERSISTENT ATRIAL FIBRILLATION (H): ICD-10-CM

## 2024-07-03 LAB
ANION GAP SERPL CALCULATED.3IONS-SCNC: 16 MMOL/L (ref 7–15)
BUN SERPL-MCNC: 16.1 MG/DL (ref 6–20)
CALCIUM SERPL-MCNC: 8.8 MG/DL (ref 8.6–10)
CHLORIDE SERPL-SCNC: 99 MMOL/L (ref 98–107)
CREAT SERPL-MCNC: 0.77 MG/DL (ref 0.51–0.95)
DEPRECATED HCO3 PLAS-SCNC: 25 MMOL/L (ref 22–29)
EGFRCR SERPLBLD CKD-EPI 2021: >90 ML/MIN/1.73M2
GLUCOSE SERPL-MCNC: 92 MG/DL (ref 70–99)
POTASSIUM SERPL-SCNC: 4.3 MMOL/L (ref 3.4–5.3)
SODIUM SERPL-SCNC: 140 MMOL/L (ref 135–145)

## 2024-07-03 PROCEDURE — 99214 OFFICE O/P EST MOD 30 MIN: CPT | Performed by: NURSE PRACTITIONER

## 2024-07-03 PROCEDURE — 80048 BASIC METABOLIC PNL TOTAL CA: CPT | Performed by: NURSE PRACTITIONER

## 2024-07-03 PROCEDURE — 93000 ELECTROCARDIOGRAM COMPLETE: CPT | Performed by: NURSE PRACTITIONER

## 2024-07-03 PROCEDURE — 36415 COLL VENOUS BLD VENIPUNCTURE: CPT | Performed by: NURSE PRACTITIONER

## 2024-07-03 RX ORDER — DILTIAZEM HYDROCHLORIDE 120 MG/1
120 CAPSULE, COATED, EXTENDED RELEASE ORAL DAILY
Qty: 90 CAPSULE | Refills: 3 | Status: SHIPPED | OUTPATIENT
Start: 2024-07-03

## 2024-07-03 NOTE — PATIENT INSTRUCTIONS
Thanks for participating in a office visit with the [facility] clinic today.    EKG today showed normal rhythm, continue diltiazem and Eliquis.   Doing well on a heart failure standpoint. Continue lasix.   Follow up echo in 1 month as scheduled   Encourage exercise, weight loss, and strict low sodium diet.   BMP today     Follow up in 6 months with Dr. Wick     Please call my nurse at  [phone] with any questions or concerns.    Scheduling phone number: [phone]  Reminder: Please bring in all current medications, over the counter supplements and vitamin bottles to your next appointment.

## 2024-07-03 NOTE — PROGRESS NOTES
CARDIOLOGY CLINIC NOTE    PRIMARY CARDIOLOGIST  Dr. Wick/ Dr. Stevens    PRIMARY CARE PHYSICIAN:  [facility]    HISTORY OF PRESENT ILLNESS:  Shilpa Miramontes is a very pleasant 54-year-old female with a past medical history significant for nonrheumatic aortic valve stenosis, undiagnosed COPD, former smoker, hypertension, hyperlipidemia, Diastolic heart failure, atrial fibrillation and morbid obesity.     In review, she was hospitalized in February with acute diastolic heart failure and new onset atrial fibrillation with RVR.  She was aggressively diuresed and transitioned to oral Lasix.  She was recommended for ADAMARIS/cardioversion but ADAMARIS confirmed a thrombus in the left atrial appendage ultimately canceling cardioversion.  She remained rate controlled with diltiazem, metoprolol and digoxin but did develop bradycardia and pauses. She is a [id number] 4 and initiated on Eliquis.    She was seen in outpatient follow-up with Dr. Stevens () who felt her rate control was too tight and digoxin was discontinued and Toprol 25 mg was decreased from twice daily to daily. She was recommend to proceed with outpatient ADAMARIS/cardioversion.     Unfortunately, she developed worsening dyspnea and presented to the ER on 5/12/2024.  EKG showed A-fib with RVR with rates in the 130s to 150s.  Chest x-ray showed no acute process.  Troponin, BNP, and lactic acid were within normal limits.  She was treated with nebulizer treatments, prednisone, IV antibiotics and IV diuretics.  She subsequently underwent a ADAMARIS with successful cardioversion on 5/14/2024.  Metoprolol was discontinued due to wheezing.  She remained on diltiazem and apixaban.    She returns to the office today for hospital follow-up.  She is feeling well on a cardiac standpoint with a complete resolution of shortness of breath, palpitations, orthopnea, or edema.  She is down approximately 15 pounds over the last 3 months and states she is doing more activity now  than she has done in years.  She does note an episode of positional lightheadedness while in a restaurant where she sustained a fall resulting in a ankle sprain. She is being evaluated for sleep apnea and underwent a sleep study last evening.  She has also been recommended for a Lexiscan which is scheduled in the next few weeks with an outside facility.    EKG today shows sinus rhythm with a rate of 84.      Blood pressure is well-controlled at 119/77  BMP today showed a sodium of 140, potassium 4.3, BUN 16.1, creatinine 0.77 and GFR greater than 90    She does not smoke or use alcohol  Follows a strict low-sodium diet.  Compliant with all medication.    PAST MEDICAL HISTORY:  No past medical history on file.    MEDICATIONS:  Current Outpatient Medications   Medication Sig Dispense Refill    apixaban ANTICOAGULANT (ELIQUIS) 5 MG tablet Take 1 tablet (5 mg) by mouth 2 times daily 180 tablet 3    atorvastatin (LIPITOR) 10 MG tablet Take 1 tablet (10 mg) by mouth every evening 90 tablet 3    cetirizine (ZYRTEC) 10 MG tablet Take 1 tablet (10 mg) by mouth daily 30 tablet 0    diltiazem ER COATED BEADS (CARDIZEM CD/CARTIA XT) 120 MG 24 hr capsule Take 1 capsule (120 mg) by mouth daily 90 capsule 3    fluticasone (FLONASE) 50 MCG/ACT nasal spray Spray 1 spray into both nostrils daily as needed for rhinitis or allergies      furosemide (LASIX) 40 MG tablet Take 1 tablet (40 mg) by mouth daily 90 tablet 0    levalbuterol (XOPENEX HFA) 45 MCG/ACT inhaler Inhale 2 puffs into the lungs every 4 hours as needed for shortness of breath or wheezing 30 g 0     No current facility-administered medications for this visit.       SOCIAL HISTORY:  I have reviewed this patient's social history and updated it with pertinent information if needed. Shilpa MAKI Kulwinder  reports that she has been smoking cigarettes. She has never used smokeless tobacco. She reports that she does not currently use alcohol. She reports that she does not use  drugs.    PHYSICAL EXAM:  Pulse:  [83] 83  BP: (119)/(77) 119/77  SpO2:  [96 %] 96 %  286 lbs 4.8 oz    Constitutional: alert, no distress  Respiratory: Good bilateral air entry  Cardiovascular: Irregular rate and rhythm  GI: nondistended  Neuropsychiatric: appropriate affact    ASSESSMENT/PLAN:  Pertinent issues addressed/ reviewed during this cardiology visit  Atrial fibrillation -status post successful ADAMARIS/cardioversion on 5/14/2024 . EKG today confirms normal sinus rhythm with rate of 84.  Continue diltiazem and Eliquis for stroke prevention.  2. Diastolic heart failure -  Euvolemic upon exam, weights stable. EF 40%. She does not appear in decompensated heart failure.  Continue current dose of Lasix.  BMP today was stable.  Strict low-sodium diet. Follow up echocardiogram in August to reassess LV function.   3. DESIRE thrombus -resolved.  On apixaban  4. Atypical Chest pain - intermittent.  Scheduled for outpatient stress test.  5.  Aortic Stenosis - Echocardiogram showed mild aortic stenosis with a mean gradient of 17 mmHg and mild aortic regurgitation.   6. CATARINO - Follows with sleep medicine. Awaiting results of sleep study.   7.  Hyperlipidemia - low dose atorvastatin.     Follow up in 6 months to establish care with Dr. Wick        It was a pleasure seeing this patient in clinic today. Please do not hesitate to contact me with any future questions.     JOYCE Alegria, CNP  Cardiology - [facility]  07/03/2024       The level of medical decision making during this visit was of moderate complexity.    This note was completed in part using dictation via the Dragon voice recognition software. Some word and grammatical errors may occur and must be interpreted in the appropriate clinical context.  If there are any questions pertaining to this issue, please contact me for further clarification.

## 2024-07-03 NOTE — LETTER
7/3/2024    [facility]  [address] [facility] [address]    RE: Shilpa Miramontes       Dear Colleague,     I had the pleasure of seeing Shilpa Miramontes in the [facility].  CARDIOLOGY CLINIC NOTE    PRIMARY CARDIOLOGIST  Dr. Wick/ Dr. Stevens    PRIMARY CARE PHYSICIAN:  [facility]    HISTORY OF PRESENT ILLNESS:  Shilpa Miramontes is a very pleasant 54-year-old female with a past medical history significant for nonrheumatic aortic valve stenosis, undiagnosed COPD, former smoker, hypertension, hyperlipidemia, Diastolic heart failure, atrial fibrillation and morbid obesity.     In review, she was hospitalized in February with acute diastolic heart failure and new onset atrial fibrillation with RVR.  She was aggressively diuresed and transitioned to oral Lasix.  She was recommended for ADAMARIS/cardioversion but ADAMARIS confirmed a thrombus in the left atrial appendage ultimately canceling cardioversion.  She remained rate controlled with diltiazem, metoprolol and digoxin but did develop bradycardia and pauses. She is a [id number] 4 and initiated on Eliquis.    She was seen in outpatient follow-up with Dr. Stevens () who felt her rate control was too tight and digoxin was discontinued and Toprol 25 mg was decreased from twice daily to daily. She was recommend to proceed with outpatient ADAMARIS/cardioversion.     Unfortunately, she developed worsening dyspnea and presented to the ER on 5/12/2024.  EKG showed A-fib with RVR with rates in the 130s to 150s.  Chest x-ray showed no acute process.  Troponin, BNP, and lactic acid were within normal limits.  She was treated with nebulizer treatments, prednisone, IV antibiotics and IV diuretics.  She subsequently underwent a ADAMARIS with successful cardioversion on 5/14/2024.  Metoprolol was discontinued due to wheezing.  She remained on diltiazem and apixaban.    She returns to the office today for hospital follow-up.  She is  feeling well on a cardiac standpoint with a complete resolution of shortness of breath, palpitations, orthopnea, or edema.  She is down approximately 15 pounds over the last 3 months and states she is doing more activity now than she has done in years.  She does note an episode of positional lightheadedness while in a restaurant where she sustained a fall resulting in a ankle sprain. She is being evaluated for sleep apnea and underwent a sleep study last evening.  She has also been recommended for a Lexiscan which is scheduled in the next few weeks with an outside facility.    EKG today shows sinus rhythm with a rate of 84.      Blood pressure is well-controlled at 119/77  BMP today showed a sodium of 140, potassium 4.3, BUN 16.1, creatinine 0.77 and GFR greater than 90    She does not smoke or use alcohol  Follows a strict low-sodium diet.  Compliant with all medication.    PAST MEDICAL HISTORY:  No past medical history on file.    MEDICATIONS:  Current Outpatient Medications   Medication Sig Dispense Refill    apixaban ANTICOAGULANT (ELIQUIS) 5 MG tablet Take 1 tablet (5 mg) by mouth 2 times daily 180 tablet 3    atorvastatin (LIPITOR) 10 MG tablet Take 1 tablet (10 mg) by mouth every evening 90 tablet 3    cetirizine (ZYRTEC) 10 MG tablet Take 1 tablet (10 mg) by mouth daily 30 tablet 0    diltiazem ER COATED BEADS (CARDIZEM CD/CARTIA XT) 120 MG 24 hr capsule Take 1 capsule (120 mg) by mouth daily 90 capsule 3    fluticasone (FLONASE) 50 MCG/ACT nasal spray Spray 1 spray into both nostrils daily as needed for rhinitis or allergies      furosemide (LASIX) 40 MG tablet Take 1 tablet (40 mg) by mouth daily 90 tablet 0    levalbuterol (XOPENEX HFA) 45 MCG/ACT inhaler Inhale 2 puffs into the lungs every 4 hours as needed for shortness of breath or wheezing 30 g 0     No current facility-administered medications for this visit.       SOCIAL HISTORY:  I have reviewed this patient's social history and updated it with  pertinent information if needed. Shilpa Miramontes  reports that she has been smoking cigarettes. She has never used smokeless tobacco. She reports that she does not currently use alcohol. She reports that she does not use drugs.    PHYSICAL EXAM:  Pulse:  [83] 83  BP: (119)/(77) 119/77  SpO2:  [96 %] 96 %  286 lbs 4.8 oz    Constitutional: alert, no distress  Respiratory: Good bilateral air entry  Cardiovascular: Irregular rate and rhythm  GI: nondistended  Neuropsychiatric: appropriate affact    ASSESSMENT/PLAN:  Pertinent issues addressed/ reviewed during this cardiology visit  Atrial fibrillation -status post successful ADAMARIS/cardioversion on 5/14/2024 . EKG today confirms normal sinus rhythm with rate of 84.  Continue diltiazem and Eliquis for stroke prevention.  2. Diastolic heart failure -  Euvolemic upon exam, weights stable. EF 40%. She does not appear in decompensated heart failure.  Continue current dose of Lasix.  BMP today was stable.  Strict low-sodium diet. Follow up echocardiogram in August to reassess LV function.   3. DESIRE thrombus -resolved.  On apixaban  4. Atypical Chest pain - intermittent.  Scheduled for outpatient stress test.  5.  Aortic Stenosis - Echocardiogram showed mild aortic stenosis with a mean gradient of 17 mmHg and mild aortic regurgitation.   6. ACTARINO - Follows with sleep medicine. Awaiting results of sleep study.   7.  Hyperlipidemia - low dose atorvastatin.     Follow up in 6 months to establish care with Dr. Wick        It was a pleasure seeing this patient in clinic today. Please do not hesitate to contact me with any future questions.     Nuzhat Bermudez, JOYCE, CNP  Cardiology - [facility]  07/03/2024       The level of medical decision making during this visit was of moderate complexity.    This note was completed in part using dictation via the Dragon voice recognition software. Some word and grammatical errors may occur and must be interpreted in the appropriate clinical context.   If there are any questions pertaining to this issue, please contact me for further clarification.    Thank you for allowing me to participate in the care of your patient.      Sincerely,   JOYCE Alegria CNP   [facility]  cc:   JOYCE Alegria CNP  [address] [place] [address]

## 2024-08-08 ENCOUNTER — HOSPITAL ENCOUNTER (OUTPATIENT)
Dept: CARDIOLOGY | Facility: CLINIC | Age: 54
Discharge: HOME | End: 2024-08-08
Attending: NURSE PRACTITIONER | Admitting: NURSE PRACTITIONER
Payer: COMMERCIAL

## 2024-08-08 DIAGNOSIS — R07.89 ATYPICAL CHEST PAIN: ICD-10-CM

## 2024-08-08 LAB — LVEF ECHO: NORMAL

## 2024-08-08 PROCEDURE — 999N000208 ECHOCARDIOGRAM COMPLETE

## 2024-08-08 PROCEDURE — 93306 TTE W/DOPPLER COMPLETE: CPT | Mod: 26 | Performed by: INTERNAL MEDICINE

## 2024-08-08 PROCEDURE — 255N000002 HC RX 255 OP 636: Performed by: NURSE PRACTITIONER

## 2024-08-08 RX ADMIN — HUMAN ALBUMIN MICROSPHERES AND PERFLUTREN 3 ML: 10; .22 INJECTION, SOLUTION INTRAVENOUS at 13:22

## 2024-08-12 ENCOUNTER — TELEPHONE (OUTPATIENT)
Dept: CARDIOLOGY | Facility: CLINIC | Age: 54
End: 2024-08-12
Payer: COMMERCIAL

## 2024-08-12 NOTE — TELEPHONE ENCOUNTER
Sera Quach E, APRN CNP  [facility] Heart Nursing Team  Preserved LVEF (was 40% at time of cardioversion)  Slight progressive of aortic stenosis  (now considered moderate)  Improved MR/TR   sinus rhythm noted at time of ECHO  Follow up as planned with Dr Wick      Contacted patient to review echocardiogram results and comments from Sera Quach. Patient did not answer. Left message for patient to call back.

## 2024-08-12 NOTE — TELEPHONE ENCOUNTER
Spoke with patient about results and Sera's comments. Patient verbalized understanding and agreed with plan of care.

## 2024-08-19 DIAGNOSIS — G47.33 OSA (OBSTRUCTIVE SLEEP APNEA): ICD-10-CM

## 2024-08-19 DIAGNOSIS — I48.91 ATRIAL FIBRILLATION WITH RVR (H): ICD-10-CM

## 2024-08-19 DIAGNOSIS — I51.3 THROMBUS OF LEFT ATRIAL APPENDAGE: ICD-10-CM

## 2024-08-19 RX ORDER — FUROSEMIDE 40 MG
40 TABLET ORAL DAILY
Qty: 90 TABLET | Refills: 3 | Status: SHIPPED | OUTPATIENT
Start: 2024-08-19

## 2024-12-17 ENCOUNTER — HOSPITAL ENCOUNTER (EMERGENCY)
Facility: CLINIC | Age: 54
Discharge: HOME | End: 2024-12-17
Attending: STUDENT IN AN ORGANIZED HEALTH CARE EDUCATION/TRAINING PROGRAM | Admitting: STUDENT IN AN ORGANIZED HEALTH CARE EDUCATION/TRAINING PROGRAM
Payer: COMMERCIAL

## 2024-12-17 VITALS
BODY MASS INDEX: 51.03 KG/M2 | SYSTOLIC BLOOD PRESSURE: 119 MMHG | HEIGHT: 61 IN | DIASTOLIC BLOOD PRESSURE: 73 MMHG | HEART RATE: 75 BPM | WEIGHT: 270.28 LBS | OXYGEN SATURATION: 96 % | RESPIRATION RATE: 16 BRPM | TEMPERATURE: 96.8 F

## 2024-12-17 DIAGNOSIS — I48.91 ATRIAL FIBRILLATION WITH RVR (H): ICD-10-CM

## 2024-12-17 DIAGNOSIS — I48.92 ATRIAL FLUTTER WITH RAPID VENTRICULAR RESPONSE (H): ICD-10-CM

## 2024-12-17 LAB
ANION GAP SERPL CALCULATED.3IONS-SCNC: 16 MMOL/L (ref 7–15)
BASOPHILS # BLD AUTO: 0.1 10E3/UL (ref 0–0.2)
BASOPHILS NFR BLD AUTO: 1 %
BUN SERPL-MCNC: 10.7 MG/DL (ref 6–20)
CALCIUM SERPL-MCNC: 9 MG/DL (ref 8.8–10.4)
CHLORIDE SERPL-SCNC: 103 MMOL/L (ref 98–107)
CREAT SERPL-MCNC: 0.9 MG/DL (ref 0.51–0.95)
EGFRCR SERPLBLD CKD-EPI 2021: 76 ML/MIN/1.73M2
EOSINOPHIL # BLD AUTO: 0.3 10E3/UL (ref 0–0.7)
EOSINOPHIL NFR BLD AUTO: 4 %
ERYTHROCYTE [DISTWIDTH] IN BLOOD BY AUTOMATED COUNT: 13.4 % (ref 10–15)
GLUCOSE SERPL-MCNC: 107 MG/DL (ref 70–99)
HCO3 SERPL-SCNC: 22 MMOL/L (ref 22–29)
HCT VFR BLD AUTO: 47.1 % (ref 35–47)
HGB BLD-MCNC: 15.5 G/DL (ref 11.7–15.7)
HOLD SPECIMEN: NORMAL
HOLD SPECIMEN: NORMAL
IMM GRANULOCYTES # BLD: 0 10E3/UL
IMM GRANULOCYTES NFR BLD: 0 %
LYMPHOCYTES # BLD AUTO: 2 10E3/UL (ref 0.8–5.3)
LYMPHOCYTES NFR BLD AUTO: 25 %
MCH RBC QN AUTO: 29.6 PG (ref 26.5–33)
MCHC RBC AUTO-ENTMCNC: 32.9 G/DL (ref 31.5–36.5)
MCV RBC AUTO: 90 FL (ref 78–100)
MONOCYTES # BLD AUTO: 0.6 10E3/UL (ref 0–1.3)
MONOCYTES NFR BLD AUTO: 7 %
NEUTROPHILS # BLD AUTO: 5 10E3/UL (ref 1.6–8.3)
NEUTROPHILS NFR BLD AUTO: 63 %
NRBC # BLD AUTO: 0 10E3/UL
NRBC BLD AUTO-RTO: 0 /100
PLATELET # BLD AUTO: 214 10E3/UL (ref 150–450)
POTASSIUM SERPL-SCNC: 3.4 MMOL/L (ref 3.4–5.3)
RBC # BLD AUTO: 5.23 10E6/UL (ref 3.8–5.2)
SODIUM SERPL-SCNC: 141 MMOL/L (ref 135–145)
WBC # BLD AUTO: 7.9 10E3/UL (ref 4–11)

## 2024-12-17 PROCEDURE — 36415 COLL VENOUS BLD VENIPUNCTURE: CPT | Performed by: STUDENT IN AN ORGANIZED HEALTH CARE EDUCATION/TRAINING PROGRAM

## 2024-12-17 PROCEDURE — 250N000009 HC RX 250: Performed by: STUDENT IN AN ORGANIZED HEALTH CARE EDUCATION/TRAINING PROGRAM

## 2024-12-17 PROCEDURE — 85004 AUTOMATED DIFF WBC COUNT: CPT | Performed by: STUDENT IN AN ORGANIZED HEALTH CARE EDUCATION/TRAINING PROGRAM

## 2024-12-17 PROCEDURE — 250N000011 HC RX IP 250 OP 636: Performed by: STUDENT IN AN ORGANIZED HEALTH CARE EDUCATION/TRAINING PROGRAM

## 2024-12-17 PROCEDURE — 80048 BASIC METABOLIC PNL TOTAL CA: CPT | Performed by: STUDENT IN AN ORGANIZED HEALTH CARE EDUCATION/TRAINING PROGRAM

## 2024-12-17 PROCEDURE — 999N000157 HC STATISTIC RCP TIME EA 10 MIN

## 2024-12-17 PROCEDURE — 96376 TX/PRO/DX INJ SAME DRUG ADON: CPT

## 2024-12-17 PROCEDURE — 85041 AUTOMATED RBC COUNT: CPT | Performed by: STUDENT IN AN ORGANIZED HEALTH CARE EDUCATION/TRAINING PROGRAM

## 2024-12-17 PROCEDURE — 96365 THER/PROPH/DIAG IV INF INIT: CPT

## 2024-12-17 PROCEDURE — 99291 CRITICAL CARE FIRST HOUR: CPT | Mod: 25

## 2024-12-17 PROCEDURE — 96375 TX/PRO/DX INJ NEW DRUG ADDON: CPT

## 2024-12-17 PROCEDURE — 258N000003 HC RX IP 258 OP 636: Performed by: STUDENT IN AN ORGANIZED HEALTH CARE EDUCATION/TRAINING PROGRAM

## 2024-12-17 PROCEDURE — 99152 MOD SED SAME PHYS/QHP 5/>YRS: CPT

## 2024-12-17 PROCEDURE — 999N000055 HC STATISTIC END TITIAL CO2 MONITORING

## 2024-12-17 PROCEDURE — 272N000240 HC CARDIOVERT/DEFIB/PACER SUPP

## 2024-12-17 RX ORDER — MAGNESIUM SULFATE HEPTAHYDRATE 40 MG/ML
2 INJECTION, SOLUTION INTRAVENOUS ONCE
Status: COMPLETED | OUTPATIENT
Start: 2024-12-17 | End: 2024-12-17

## 2024-12-17 RX ORDER — DILTIAZEM HYDROCHLORIDE 5 MG/ML
20 INJECTION INTRAVENOUS ONCE
Status: COMPLETED | OUTPATIENT
Start: 2024-12-17 | End: 2024-12-17

## 2024-12-17 RX ORDER — ETOMIDATE 2 MG/ML
0.1 INJECTION INTRAVENOUS ONCE
Status: COMPLETED | OUTPATIENT
Start: 2024-12-17 | End: 2024-12-17

## 2024-12-17 RX ORDER — DILTIAZEM HYDROCHLORIDE 5 MG/ML
0.25 INJECTION INTRAVENOUS ONCE
Status: COMPLETED | OUTPATIENT
Start: 2024-12-17 | End: 2024-12-17

## 2024-12-17 RX ADMIN — ETOMIDATE 12.2 MG: 40 INJECTION, SOLUTION INTRAVENOUS at 18:09

## 2024-12-17 RX ADMIN — DILTIAZEM HYDROCHLORIDE 20 MG: 5 INJECTION, SOLUTION INTRAVENOUS at 16:43

## 2024-12-17 RX ADMIN — DILTIAZEM HYDROCHLORIDE 30.65 MG: 5 INJECTION, SOLUTION INTRAVENOUS at 17:19

## 2024-12-17 RX ADMIN — MAGNESIUM SULFATE HEPTAHYDRATE 2 G: 40 INJECTION, SOLUTION INTRAVENOUS at 16:46

## 2024-12-17 RX ADMIN — SODIUM CHLORIDE 1000 ML: 9 INJECTION, SOLUTION INTRAVENOUS at 16:45

## 2024-12-17 ASSESSMENT — ACTIVITIES OF DAILY LIVING (ADL)
ADLS_ACUITY_SCORE: 59

## 2024-12-17 ASSESSMENT — COLUMBIA-SUICIDE SEVERITY RATING SCALE - C-SSRS
1. IN THE PAST MONTH, HAVE YOU WISHED YOU WERE DEAD OR WISHED YOU COULD GO TO SLEEP AND NOT WAKE UP?: NO
2. HAVE YOU ACTUALLY HAD ANY THOUGHTS OF KILLING YOURSELF IN THE PAST MONTH?: NO
6. HAVE YOU EVER DONE ANYTHING, STARTED TO DO ANYTHING, OR PREPARED TO DO ANYTHING TO END YOUR LIFE?: NO

## 2024-12-17 NOTE — ED TRIAGE NOTES
Pt arrives with complaint of feeling generally unwell, palpitations, symptoms began this morning. Cardioverted in May for atrial fibrillation. On eliquis, denies missed doses. Endorses chest pain and shortness of breath. A&Ox4.

## 2024-12-18 LAB
ATRIAL RATE - MUSE: 76 BPM
ATRIAL RATE - MUSE: 78 BPM
DIASTOLIC BLOOD PRESSURE - MUSE: NORMAL MMHG
DIASTOLIC BLOOD PRESSURE - MUSE: NORMAL MMHG
INTERPRETATION ECG - MUSE: NORMAL
INTERPRETATION ECG - MUSE: NORMAL
P AXIS - MUSE: 45 DEGREES
P AXIS - MUSE: NORMAL DEGREES
PR INTERVAL - MUSE: 156 MS
PR INTERVAL - MUSE: NORMAL MS
QRS DURATION - MUSE: 144 MS
QRS DURATION - MUSE: 76 MS
QT - MUSE: 366 MS
QT - MUSE: 368 MS
QTC - MUSE: 414 MS
QTC - MUSE: 576 MS
R AXIS - MUSE: 59 DEGREES
R AXIS - MUSE: 70 DEGREES
SYSTOLIC BLOOD PRESSURE - MUSE: NORMAL MMHG
SYSTOLIC BLOOD PRESSURE - MUSE: NORMAL MMHG
T AXIS - MUSE: 260 DEGREES
T AXIS - MUSE: 60 DEGREES
VENTRICULAR RATE- MUSE: 149 BPM
VENTRICULAR RATE- MUSE: 76 BPM

## 2024-12-18 NOTE — PROGRESS NOTES
"         [facility]  Respiratory Care Note     An ETCO2 monitor was placed on the pt with 10 LPM bled in. The Ambu bag, suction, and any necessary airway supplies were setup and present in the room but not needed. Pt needed a brief intervention to maintain airway during the procedure. ETCO2 levels were maintained between 32 and 17.    Vital signs:  Temp: 96.8  F (36  C)   BP: 133/74 Pulse: 88   Resp: 16 SpO2: 99 % O2 Device: Oxymask Oxygen Delivery: 10 LPM Height: 154.9 cm (5' 1\") Weight: 122.6 kg (270 lb 4.5 oz)  Estimated body mass index is 51.07 kg/m  as calculated from the following:    Height as of this encounter: 1.549 m (5' 1\").    Weight as of this encounter: 122.6 kg (270 lb 4.5 oz).      No past medical history on file.    Past Surgical History:   Procedure Laterality Date    ANESTHESIA CARDIOVERSION N/A 5/14/2024    Procedure: Anesthesia cardioversion;  Surgeon: GENERIC ANESTHESIA PROVIDER;  Location: [facility] OR    CHOLECYSTECTOMY  2004       Family History   Problem Relation Age of Onset    Family History Negative Mother     Pacemaker Father         history of HTN prior to bariatric surgery    Prostate Cancer Paternal Grandfather 88       Social History     Tobacco Use    Smoking status: Some Days     Current packs/day: 0.50     Types: Cigarettes    Smokeless tobacco: Never    Tobacco comments:     Socially has a cigarette while consuming alcohol    Substance Use Topics    Alcohol use: Not Currently     Comment: every few months     Pasquale Canales CRT  [facility]  12/17/2024      "

## 2024-12-18 NOTE — ED PROVIDER NOTES
"  Emergency Department Note      History of Present Illness     Chief Complaint   Tachycardia      HPI   Shilpa Miramontes is a 54 year old female with history of atrial fibrillation on apixaban, asthma, sleep apnea, presenting with concerns of abrupt onset palpitations that started this morning and feeling generally unwell.  Triage note comments on chest pain and shortness of breath.  Patient specifically denies chest pain and shortness of breath to me.  She feels this is here atrial fibrillation.  She is compliant with her diltiazem and she is compliant with her apixaban and has been on it for more than 3 weeks.  No recent illness.  No fever.  Was recently on a steroid for a hip issue.  Is also on an injection medication for weight loss.  No diarrhea or vomiting or urinary symptoms.    Independent Historian   None    Review of External Notes   May 15, 2024-discharge summary for respiratory failure, A-fib with RVR.    Past Medical History     Medical History and Problem List   No past medical history on file.    Medications   apixaban ANTICOAGULANT (ELIQUIS) 5 MG tablet  atorvastatin (LIPITOR) 10 MG tablet  cetirizine (ZYRTEC) 10 MG tablet  diltiazem ER COATED BEADS (CARDIZEM CD/CARTIA XT) 120 MG 24 hr capsule  fluticasone (FLONASE) 50 MCG/ACT nasal spray  furosemide (LASIX) 40 MG tablet  levalbuterol (XOPENEX HFA) 45 MCG/ACT inhaler        Surgical History   Past Surgical History:   Procedure Laterality Date    ANESTHESIA CARDIOVERSION N/A 5/14/2024    Procedure: Anesthesia cardioversion;  Surgeon: GENERIC ANESTHESIA PROVIDER;  Location: [facility] OR    CHOLECYSTECTOMY  2004       Physical Exam     Patient Vitals for the past 24 hrs:   BP Temp Pulse Resp SpO2 Height Weight   12/17/24 1812 133/74 -- 88 16 99 % -- --   12/17/24 1810 (!) 129/101 -- -- -- -- -- --   12/17/24 1805 (!) 125/92 -- -- -- -- -- --   12/17/24 1758 (!) 136/97 -- (!) 134 20 98 % -- --   12/17/24 1541 -- -- -- -- -- 1.549 m (5' 1\") 122.6 kg (270 lb " 4.5 oz)   12/17/24 1540 (!) 134/114 96.8  F (36  C) (!) 153 18 98 % -- --     Physical Exam  GENERAL: Patient slightly anxious and tearful.  HEAD: Atraumatic.  NECK: No rigidity  Throat: Pharynx patent.  CV: Tachycardic and regular, no murmurs, rubs or gallops  PULM: CTAB with good aeration; no retractions, rales, rhonchi, or wheezing  ABD: Soft, nontender, nondistended, no guarding  DERM: No rash. Skin warm and dry  EXTREMITY: Moving all extremities without difficulty. No calf tenderness    Diagnostics     Lab Results   Labs Ordered and Resulted from Time of ED Arrival to Time of ED Departure   BASIC METABOLIC PANEL - Abnormal       Result Value    Sodium 141      Potassium 3.4      Chloride 103      Carbon Dioxide (CO2) 22      Anion Gap 16 (*)     Urea Nitrogen 10.7      Creatinine 0.90      GFR Estimate 76      Calcium 9.0      Glucose 107 (*)    CBC WITH PLATELETS AND DIFFERENTIAL - Abnormal    WBC Count 7.9      RBC Count 5.23 (*)     Hemoglobin 15.5      Hematocrit 47.1 (*)     MCV 90      MCH 29.6      MCHC 32.9      RDW 13.4      Platelet Count 214      % Neutrophils 63      % Lymphocytes 25      % Monocytes 7      % Eosinophils 4      % Basophils 1      % Immature Granulocytes 0      NRBCs per 100 WBC 0      Absolute Neutrophils 5.0      Absolute Lymphocytes 2.0      Absolute Monocytes 0.6      Absolute Eosinophils 0.3      Absolute Basophils 0.1      Absolute Immature Granulocytes 0.0      Absolute NRBCs 0.0         Imaging   No orders to display   ECG interpreted by me  Time 1552  Atrial flutter with RVR rate 149.  Normal axis.  No STEMI.  .  QTc 576.    ECG interpreted by me.  Time 1559  Atrial flutter with RVR rate 148.  .  QTc 527.  Normal axis.  No STEMI.    ECG interpreted by me.  Time 1811  NSR at 76. No ST elevation or depression. Normal intervals. Normal axis.   No evidence of WPW, Brugada, HOCM, ARVD, ASD, or Wellen's.           Independent Interpretation   None    ED Course       Medications Administered   Medications   diltiazem (CARDIZEM) injection 20 mg (20 mg Intravenous $Given 12/17/24 1643)   sodium chloride 0.9% BOLUS 1,000 mL (0 mLs Intravenous Stopped 12/17/24 1826)   magnesium sulfate 2 g in 50 mL sterile water intermittent infusion (0 g Intravenous Stopped 12/17/24 1802)   diltiazem (CARDIZEM) injection 30.65 mg (30.65 mg Intravenous $Given 12/17/24 1719)   etomidate (AMIDATE) injection 12.2 mg (12.2 mg Intravenous $Given 12/17/24 1809)       Procedures     Sedation     Procedure: Procedural Sedation    Sedation Level: Moderate    Indication: Cardioversion    Consent: Written from Patient     Universal protocol: Universal protocol was followed and time out conducted just prior to starting procedure, confirming patient identity, site/side, procedure, patient position, and availability of correct equipment and implants.      Last PO Intake: Emergent procedure    ASA Class: Class 2 - A patient with mild systemic disease     Exam:  Mallampati:  Grade 4 - Only hard palate visible   Lungs: Clear   Heart: Carkin regular.    Medication: Etomidate  Dose: 12 mg     Monitoring:  Monitoring consisted of heart rate, cardiac, continuous pulse oximetry, frequent blood pressure checks.   There was constant attendance by RN until patient recovered and constant attendance by physician until patient stable.   Intubation and emergency airway equipment available.     Response: Vital signs stable, oxygen saturations greater than 92%  and Positioning required to maintain patent airway       Patient Status: Post procedure patient was alert.     Total Physician Drug Administration / Monitoring Time: 10 minutes.     Patient was monitored during recovery and returned to pre-procedure baseline.     Electrical Cardioversion         Procedure: Electrical Cardioversion        Indication: Atrial Fibrillation and Atrial Fluter     Consent: Written from Patient     Universal Protocol: Universal protocol was  followed and time out conducted just prior to starting procedure, confirming patient identity, site/side, procedure, patient position, and availability of correct equipment and implants.      Anesthesia/Sedation: Sedation: The patient was sedated, see separate procedure note for details.      Procedure Detail:   Electrodes were placed: Anterior/posterior  Trial #1: Synchronized Cardioversion at 200 Joules   Cardioversion was successful      Patient Status:  The patient tolerated the procedure well: Yes. There were no complications.      Discussion of Management   None    ED Course        Additional Documentation  None    Medical Decision Making / Diagnosis     CMS Diagnoses: None    MIPS       None    MDM   Shilpa Miramontes is a 54 year old female     Symptoms consistent with atrial flutter with RVR.  Chronic conditions complicating -atrial fibrillation.    DDx thyroid dysfunction, electrolyte abnormality, infection    ECG independently interpreted -atrial flutter.  Computer read out is stating a wide QRS, but I think this is incorrect.  I think this is picking up on atrial flutter waves.    Patient initially was very nervous about cardioversion so she preferred medications.  She is given IV fluids, 2 doses of IV diltiazem, and IV magnesium.  Did have some improvement in rate, but then she went into a fib with RVR.  Therefore elected for cardioversion.    Patient was successfully cardioverted.  Afterward all of her symptoms resolved.  She walked around and felt very well and was ready to go home.  Patient is on anticoagulation, I recommend she continue.  She will continue her diltiazem.  She has cardiology follow-up in 2 days.  Admission not indicated.    I have evaluated the patient for acute medical emergencies and have clinically decided no further acute medical interventions are required. Reassessed multiple times. Patient stable for discharge. All questions answered. Given strict return precautions. Patient  content with plan. The differential diagnosis and treatment modalities were discussed thoroughly with the patient.       Disposition   The patient was discharged.     Diagnosis     ICD-10-CM    1. Atrial flutter with rapid ventricular response (H)  I48.92       2. Atrial fibrillation with RVR (H)  I48.91            Discharge Medications   New Prescriptions    No medications on file         MD James Ivan Kevin, MD  12/17/24 [id number]

## 2024-12-18 NOTE — DISCHARGE INSTRUCTIONS
Return to the emergency department if symptoms are worsening, become concerning, or for any other concerns. Follow-up with your doctor in 2-3 and sooner if needed.

## 2024-12-19 ENCOUNTER — OFFICE VISIT (OUTPATIENT)
Dept: CARDIOLOGY | Facility: CLINIC | Age: 54
End: 2024-12-19
Payer: COMMERCIAL

## 2024-12-19 VITALS
HEART RATE: 71 BPM | DIASTOLIC BLOOD PRESSURE: 70 MMHG | HEIGHT: 61 IN | WEIGHT: 266.2 LBS | SYSTOLIC BLOOD PRESSURE: 121 MMHG | BODY MASS INDEX: 50.26 KG/M2 | OXYGEN SATURATION: 98 %

## 2024-12-19 DIAGNOSIS — I51.3 THROMBUS OF LEFT ATRIAL APPENDAGE: ICD-10-CM

## 2024-12-19 DIAGNOSIS — G47.33 OSA (OBSTRUCTIVE SLEEP APNEA): ICD-10-CM

## 2024-12-19 DIAGNOSIS — I48.0 PAROXYSMAL ATRIAL FIBRILLATION (H): ICD-10-CM

## 2024-12-19 RX ORDER — DILTIAZEM HCL 60 MG
120 TABLET ORAL PRN
Qty: 20 TABLET | Refills: 3 | Status: SHIPPED | OUTPATIENT
Start: 2024-12-19

## 2024-12-19 NOTE — LETTER
12/19/2024    [facility]  [address] [address] [address]    RE: Shilpa Miramontes       Dear Colleague,     I had the pleasure of seeing Shilpa Miramontes in the [facility].  HPI and Plan:   It is my pleasure to see this very pleasant 54-year-old lady for follow-up of atrial arrhythmias.    At the pleasure of seeing this lady for the first time in February 2024 when she was admitted with atrial fibrillation rapid ventricular response and resultant diastolic heart failure.  Unfortunately ADAMARIS revealed left atrial appendage thrombus and cardioversion was deferred.  She was initially started on metoprolol but this was discontinued due to wheezing.  She does have smoking history and COPD.  Her heart rate was not easy to control.  I had added digoxin which was subsequently stopped when she saw my colleague Dr. Stevens in clinic.    She was admitted again in in May with similar presentation and fortunately on this occasion ADAMARIS demonstrated resolution of the left atrial appendage thrombus.  She was then successfully cardioverted.  Echocardiography does show moderate aortic stenosis.    Body mass index is over 50.  She has since been diagnosed with CATARINO and has been using her CPAP on a regular basis she is chronically on Eliquis    Just 2 days ago she woke up from sleep feeling palpitations.  She presented to the emergency room.  I personally reviewed the EKG.  She is actually in typical atrial flutter.  She was successfully cardioverted.  Currently she is feeling fine.    Prior to the above episode she was doing well.  She has continued to abstain from cigarettes and alcohol.  She had no preceding flulike illness or any other systemic disturbance    I am happy to see that she has been prescribed a GLP-1 agonist and her body's mass index is ready decreased from 54-50.  Hopefully with ongoing weight loss episodes of atrial arrhythmias would be less.    With her aortic stenosis  history of heart failure I am a little hesitant to use antiarrhythmic such as flecainide open-known.  However should she have recurrence of atrial flutter with rapid ventricular response RF ablation is certainly an option.    In the meantime I did give her short acting diltiazem 120 mg to take as a single dose if she has a prolonged episode of atrial arrhythmias.  If this does not work after 1 hour she should would come to the emergency room again for elective cardioversion.    I will see her again in 6 months time for continued follow-up.                Orders Placed This Encounter   Procedures     Follow-Up with Cardiology     Echocardiogram Complete       Orders Placed This Encounter   Medications     diltiazem (CARDIZEM) 60 MG tablet     Sig: Take 2 tablets (120 mg) by mouth as needed (for afib recurrence).     Dispense:  20 tablet     Refill:  3       Encounter Diagnoses   Name Primary?     Paroxysmal atrial fibrillation (H)      CATARINO (obstructive sleep apnea)      Thrombus of left atrial appendage        CURRENT MEDICATIONS:  Current Outpatient Medications   Medication Sig Dispense Refill     apixaban ANTICOAGULANT (ELIQUIS) 5 MG tablet Take 1 tablet (5 mg) by mouth 2 times daily 180 tablet 3     atorvastatin (LIPITOR) 10 MG tablet Take 1 tablet (10 mg) by mouth every evening 90 tablet 3     cetirizine (ZYRTEC) 10 MG tablet Take 1 tablet (10 mg) by mouth daily 30 tablet 0     diltiazem (CARDIZEM) 60 MG tablet Take 2 tablets (120 mg) by mouth as needed (for afib recurrence). 20 tablet 3     diltiazem ER COATED BEADS (CARDIZEM CD/CARTIA XT) 120 MG 24 hr capsule Take 1 capsule (120 mg) by mouth daily 90 capsule 3     fluticasone (FLONASE) 50 MCG/ACT nasal spray Spray 1 spray into both nostrils daily as needed for rhinitis or allergies       furosemide (LASIX) 40 MG tablet Take 1 tablet (40 mg) by mouth daily 90 tablet 3     levalbuterol (XOPENEX HFA) 45 MCG/ACT inhaler Inhale 2 puffs into the lungs every 4 hours  as needed for shortness of breath or wheezing 30 g 0       ALLERGIES     Allergies   Allergen Reactions     Fluoxetine Rash     Morphine Rash     Percocet [Oxycodone-Acetaminophen] Rash       PAST MEDICAL HISTORY:  No past medical history on file.    PAST SURGICAL HISTORY:  Past Surgical History:   Procedure Laterality Date     ANESTHESIA CARDIOVERSION N/A 5/14/2024    Procedure: Anesthesia cardioversion;  Surgeon: GENERIC ANESTHESIA PROVIDER;  Location: [facility] OR     CHOLECYSTECTOMY  2004       FAMILY HISTORY:  Family History   Problem Relation Age of Onset     Family History Negative Mother      Pacemaker Father         history of HTN prior to bariatric surgery     Prostate Cancer Paternal Grandfather 88       SOCIAL HISTORY:  Social History     Socioeconomic History     Marital status: Single     Spouse name: None     Number of children: None     Years of education: None     Highest education level: None   Tobacco Use     Smoking status: Some Days     Current packs/day: 0.50     Types: Cigarettes     Smokeless tobacco: Never     Tobacco comments:     Socially has a cigarette while consuming alcohol    Substance and Sexual Activity     Alcohol use: Not Currently     Comment: every few months     Drug use: No     Sexual activity: Yes     Partners: Male   Other Topics Concern     Parent/sibling w/ CABG, MI or angioplasty before 65F 55M? No     Social Drivers of Health     Financial Resource Strain: At Risk (6/27/2023)    [facility] [facility]    Financial Resource Strain      Is it hard for you to pay for the very basics like food, housing, medical care or heating?: Yes   Food Insecurity: Food Insecurity Present (11/26/2024)    Received from "[employer]    Hunger Vital Sign      Worried About Running Out of Food in the Last Year: Often true      Ran Out of Food in the Last Year: Sometimes true   Transportation Needs: Unmet Transportation Needs (11/26/2024)    Received from "[employer]     "PRAPARE - Transportation      Lack of Transportation (Medical): Yes      Lack of Transportation (Non-Medical): Yes   Housing Stability: High Risk (11/26/2024)    Received from [facility]    Housing Stability Vital Sign      Unable to Pay for Housing in the Last Year: Yes      Number of Times Moved in the Last Year: 0      Homeless in the Last Year: No       Review of Systems:  Skin:  not assessed     Eyes:  not assessed    ENT:  not assessed    Respiratory:  Positive for sleep apnea, CPAP  Cardiovascular:    palpitations  Gastroenterology: not assessed    Genitourinary:  not assessed    Musculoskeletal:  not assessed    Neurologic:  not assessed    Psychiatric:  not assessed    Heme/Lymph/Imm:  not assessed    Endocrine:  not assessed      Physical Exam:  Vitals: /70 (BP Location: Right arm, Patient Position: Right side)   Pulse 71   Ht 1.549 m (5' 1\")   Wt 120.7 kg (266 lb 3.2 oz)   LMP 12/14/2020   SpO2 98%   BMI 50.30 kg/m      Constitutional:  cooperative, well developed morbidly obese      Skin:  warm and dry to the touch, no apparent skin lesions or masses noted          Head:  normocephalic, no masses or lesions        Eyes:  conjunctivae and lids unremarkable        Lymph:No Cervical lymphadenopathy present     ENT:  no pallor or cyanosis, dentition good        Neck:  carotid pulses are full and equal bilaterally, JVP normal, no carotid bruit        Respiratory:  normal breath sounds, clear to auscultation, normal A-P diameter, normal symmetry, normal respiratory excursion, no use of accessory muscles         Cardiac: regular rhythm, normal S1/S2, no S3 or S4, apical impulse not displaced, no murmurs, gallops or rubs                  pulses below the femoral arteries are diminished                                      GI:  abdomen soft, non-tender, BS normoactive, no mass, no HSM, no bruits        Extremities and Muscular Skeletal:  no deformities, clubbing, cyanosis, erythema observed        "       Neurological:  no gross motor deficits        Psych:  Alert and Oriented x 3        Recent Lab Results:  LIPID RESULTS:  Lab Results   Component Value Date    CHOL 145 02/15/2024    HDL 36 (L) 02/15/2024    LDL 87 02/15/2024    TRIG 108 02/15/2024       LIVER ENZYME RESULTS:  Lab Results   Component Value Date    AST 30 02/15/2024    ALT 64 (H) 02/15/2024       CBC RESULTS:  Lab Results   Component Value Date    WBC 7.9 12/17/2024    WBC 8.8 01/21/2021    RBC 5.23 (H) 12/17/2024    RBC 4.82 01/21/2021    HGB 15.5 12/17/2024    HGB 14.1 01/21/2021    HCT 47.1 (H) 12/17/2024    HCT 44.5 01/21/2021    MCV 90 12/17/2024    MCV 92 01/21/2021    MCH 29.6 12/17/2024    MCH 29.3 01/21/2021    MCHC 32.9 12/17/2024    MCHC 31.7 01/21/2021    RDW 13.4 12/17/2024    RDW 13.5 01/21/2021     12/17/2024     01/21/2021       BMP RESULTS:  Lab Results   Component Value Date     12/17/2024     01/21/2021    POTASSIUM 3.4 12/17/2024    POTASSIUM 3.6 01/21/2021    CHLORIDE 103 12/17/2024    CHLORIDE 109 01/21/2021    CO2 22 12/17/2024    CO2 29 01/21/2021    ANIONGAP 16 (H) 12/17/2024    ANIONGAP 3 01/21/2021     (H) 12/17/2024     (H) 02/20/2024    GLC 84 01/21/2021    BUN 10.7 12/17/2024    BUN 16 01/21/2021    CR 0.90 12/17/2024    CR 1.01 01/21/2021    GFRESTIMATED 76 12/17/2024    GFRESTIMATED 65 01/21/2021    GFRESTBLACK 75 01/21/2021    KRISTA 9.0 12/17/2024    KRISTA 8.7 01/21/2021        A1C RESULTS:  Lab Results   Component Value Date    A1C 6.2 (H) 02/15/2024       INR RESULTS:  Lab Results   Component Value Date    INR 1.25 (H) 05/14/2024    INR 1.06 02/15/2024           CC  Referred Self, MD  No address on file                     Thank you for allowing me to participate in the care of your patient.      Sincerely,     DR LYLY AYALA MD     [facility]  cc:   Referred MD Leobardo  No address on file

## 2024-12-19 NOTE — PROGRESS NOTES
HPI and Plan:   It is my pleasure to see this very pleasant 54-year-old lady for follow-up of atrial arrhythmias.    At the pleasure of seeing this lady for the first time in February 2024 when she was admitted with atrial fibrillation rapid ventricular response and resultant diastolic heart failure.  Unfortunately ADAMARIS revealed left atrial appendage thrombus and cardioversion was deferred.  She was initially started on metoprolol but this was discontinued due to wheezing.  She does have smoking history and COPD.  Her heart rate was not easy to control.  I had added digoxin which was subsequently stopped when she saw my colleague Dr. Stevens in clinic.    She was admitted again in in May with similar presentation and fortunately on this occasion ADAMARIS demonstrated resolution of the left atrial appendage thrombus.  She was then successfully cardioverted.  Echocardiography does show moderate aortic stenosis.    Body mass index is over 50.  She has since been diagnosed with CATARINO and has been using her CPAP on a regular basis she is chronically on Eliquis    Just 2 days ago she woke up from sleep feeling palpitations.  She presented to the emergency room.  I personally reviewed the EKG.  She is actually in typical atrial flutter.  She was successfully cardioverted.  Currently she is feeling fine.    Prior to the above episode she was doing well.  She has continued to abstain from cigarettes and alcohol.  She had no preceding flulike illness or any other systemic disturbance    I am happy to see that she has been prescribed a GLP-1 agonist and her body's mass index is ready decreased from 54-50.  Hopefully with ongoing weight loss episodes of atrial arrhythmias would be less.    With her aortic stenosis history of heart failure I am a little hesitant to use antiarrhythmic such as flecainide open-known.  However should she have recurrence of atrial flutter with rapid ventricular response RF ablation is certainly an  option.    In the meantime I did give her short acting diltiazem 120 mg to take as a single dose if she has a prolonged episode of atrial arrhythmias.  If this does not work after 1 hour she should would come to the emergency room again for elective cardioversion.    I will see her again in 6 months time for continued follow-up.                Orders Placed This Encounter   Procedures    Follow-Up with Cardiology    Echocardiogram Complete       Orders Placed This Encounter   Medications    diltiazem (CARDIZEM) 60 MG tablet     Sig: Take 2 tablets (120 mg) by mouth as needed (for afib recurrence).     Dispense:  20 tablet     Refill:  3       Encounter Diagnoses   Name Primary?    Paroxysmal atrial fibrillation (H)     CATARINO (obstructive sleep apnea)     Thrombus of left atrial appendage        CURRENT MEDICATIONS:  Current Outpatient Medications   Medication Sig Dispense Refill    apixaban ANTICOAGULANT (ELIQUIS) 5 MG tablet Take 1 tablet (5 mg) by mouth 2 times daily 180 tablet 3    atorvastatin (LIPITOR) 10 MG tablet Take 1 tablet (10 mg) by mouth every evening 90 tablet 3    cetirizine (ZYRTEC) 10 MG tablet Take 1 tablet (10 mg) by mouth daily 30 tablet 0    diltiazem (CARDIZEM) 60 MG tablet Take 2 tablets (120 mg) by mouth as needed (for afib recurrence). 20 tablet 3    diltiazem ER COATED BEADS (CARDIZEM CD/CARTIA XT) 120 MG 24 hr capsule Take 1 capsule (120 mg) by mouth daily 90 capsule 3    fluticasone (FLONASE) 50 MCG/ACT nasal spray Spray 1 spray into both nostrils daily as needed for rhinitis or allergies      furosemide (LASIX) 40 MG tablet Take 1 tablet (40 mg) by mouth daily 90 tablet 3    levalbuterol (XOPENEX HFA) 45 MCG/ACT inhaler Inhale 2 puffs into the lungs every 4 hours as needed for shortness of breath or wheezing 30 g 0       ALLERGIES     Allergies   Allergen Reactions    Fluoxetine Rash    Morphine Rash    Percocet [Oxycodone-Acetaminophen] Rash       PAST MEDICAL HISTORY:  No past medical  history on file.    PAST SURGICAL HISTORY:  Past Surgical History:   Procedure Laterality Date    ANESTHESIA CARDIOVERSION N/A 5/14/2024    Procedure: Anesthesia cardioversion;  Surgeon: GENERIC ANESTHESIA PROVIDER;  Location: [facility] OR    CHOLECYSTECTOMY  2004       FAMILY HISTORY:  Family History   Problem Relation Age of Onset    Family History Negative Mother     Pacemaker Father         history of HTN prior to bariatric surgery    Prostate Cancer Paternal Grandfather 88       SOCIAL HISTORY:  Social History     Socioeconomic History    Marital status: Single     Spouse name: None    Number of children: None    Years of education: None    Highest education level: None   Tobacco Use    Smoking status: Some Days     Current packs/day: 0.50     Types: Cigarettes    Smokeless tobacco: Never    Tobacco comments:     Socially has a cigarette while consuming alcohol    Substance and Sexual Activity    Alcohol use: Not Currently     Comment: every few months    Drug use: No    Sexual activity: Yes     Partners: Male   Other Topics Concern    Parent/sibling w/ CABG, MI or angioplasty before 65F 55M? No     Social Drivers of Health     Financial Resource Strain: At Risk (6/27/2023)    Received from [place]    Financial Resource Strain     Is it hard for you to pay for the very basics like food, housing, medical care or heating?: Yes   Food Insecurity: Food Insecurity Present (11/26/2024)    Received from [place]    Hunger Vital Sign     Worried About Running Out of Food in the Last Year: Often true     Ran Out of Food in the Last Year: Sometimes true   Transportation Needs: Unmet Transportation Needs (11/26/2024)    Received from [place]    PRAPARE - Transportation     Lack of Transportation (Medical): Yes     Lack of Transportation (Non-Medical): Yes   Housing Stability: High Risk (11/26/2024)    Received from [place]    Housing Stability Vital Sign     Unable to Pay for Housing in  "the Last Year: Yes     Number of Times Moved in the Last Year: 0     Homeless in the Last Year: No       Review of Systems:  Skin:  not assessed     Eyes:  not assessed    ENT:  not assessed    Respiratory:  Positive for sleep apnea, CPAP  Cardiovascular:    palpitations  Gastroenterology: not assessed    Genitourinary:  not assessed    Musculoskeletal:  not assessed    Neurologic:  not assessed    Psychiatric:  not assessed    Heme/Lymph/Imm:  not assessed    Endocrine:  not assessed      Physical Exam:  Vitals: /70 (BP Location: Right arm, Patient Position: Right side)   Pulse 71   Ht 1.549 m (5' 1\")   Wt 120.7 kg (266 lb 3.2 oz)   LMP 12/14/2020   SpO2 98%   BMI 50.30 kg/m      Constitutional:  cooperative, well developed morbidly obese      Skin:  warm and dry to the touch, no apparent skin lesions or masses noted          Head:  normocephalic, no masses or lesions        Eyes:  conjunctivae and lids unremarkable        Lymph:No Cervical lymphadenopathy present     ENT:  no pallor or cyanosis, dentition good        Neck:  carotid pulses are full and equal bilaterally, JVP normal, no carotid bruit        Respiratory:  normal breath sounds, clear to auscultation, normal A-P diameter, normal symmetry, normal respiratory excursion, no use of accessory muscles         Cardiac: regular rhythm, normal S1/S2, no S3 or S4, apical impulse not displaced, no murmurs, gallops or rubs                  pulses below the femoral arteries are diminished                                      GI:  abdomen soft, non-tender, BS normoactive, no mass, no HSM, no bruits        Extremities and Muscular Skeletal:  no deformities, clubbing, cyanosis, erythema observed              Neurological:  no gross motor deficits        Psych:  Alert and Oriented x 3        Recent Lab Results:  LIPID RESULTS:  Lab Results   Component Value Date    CHOL 145 02/15/2024    HDL 36 (L) 02/15/2024    LDL 87 02/15/2024    TRIG 108 02/15/2024 "       LIVER ENZYME RESULTS:  Lab Results   Component Value Date    AST 30 02/15/2024    ALT 64 (H) 02/15/2024       CBC RESULTS:  Lab Results   Component Value Date    WBC 7.9 12/17/2024    WBC 8.8 01/21/2021    RBC 5.23 (H) 12/17/2024    RBC 4.82 01/21/2021    HGB 15.5 12/17/2024    HGB 14.1 01/21/2021    HCT 47.1 (H) 12/17/2024    HCT 44.5 01/21/2021    MCV 90 12/17/2024    MCV 92 01/21/2021    MCH 29.6 12/17/2024    MCH 29.3 01/21/2021    MCHC 32.9 12/17/2024    MCHC 31.7 01/21/2021    RDW 13.4 12/17/2024    RDW 13.5 01/21/2021     12/17/2024     01/21/2021       BMP RESULTS:  Lab Results   Component Value Date     12/17/2024     01/21/2021    POTASSIUM 3.4 12/17/2024    POTASSIUM 3.6 01/21/2021    CHLORIDE 103 12/17/2024    CHLORIDE 109 01/21/2021    CO2 22 12/17/2024    CO2 29 01/21/2021    ANIONGAP 16 (H) 12/17/2024    ANIONGAP 3 01/21/2021     (H) 12/17/2024     (H) 02/20/2024    GLC 84 01/21/2021    BUN 10.7 12/17/2024    BUN 16 01/21/2021    CR 0.90 12/17/2024    CR 1.01 01/21/2021    GFRESTIMATED 76 12/17/2024    GFRESTIMATED 65 01/21/2021    GFRESTBLACK 75 01/21/2021    KRISTA 9.0 12/17/2024    KRISTA 8.7 01/21/2021        A1C RESULTS:  Lab Results   Component Value Date    A1C 6.2 (H) 02/15/2024       INR RESULTS:  Lab Results   Component Value Date    INR 1.25 (H) 05/14/2024    INR 1.06 02/15/2024           CC  Referred Leobardo, MD  No address on file

## 2025-03-23 ENCOUNTER — APPOINTMENT (OUTPATIENT)
Dept: CT IMAGING | Facility: CLINIC | Age: 55
End: 2025-03-23
Attending: EMERGENCY MEDICINE
Payer: COMMERCIAL

## 2025-03-23 ENCOUNTER — APPOINTMENT (OUTPATIENT)
Dept: GENERAL RADIOLOGY | Facility: CLINIC | Age: 55
End: 2025-03-23
Attending: EMERGENCY MEDICINE
Payer: COMMERCIAL

## 2025-03-23 ENCOUNTER — HOSPITAL ENCOUNTER (EMERGENCY)
Facility: CLINIC | Age: 55
Discharge: HOME | End: 2025-03-23
Attending: EMERGENCY MEDICINE | Admitting: EMERGENCY MEDICINE
Payer: COMMERCIAL

## 2025-03-23 VITALS
OXYGEN SATURATION: 95 % | RESPIRATION RATE: 18 BRPM | BODY MASS INDEX: 50.3 KG/M2 | HEART RATE: 66 BPM | DIASTOLIC BLOOD PRESSURE: 82 MMHG | TEMPERATURE: 98.8 F | SYSTOLIC BLOOD PRESSURE: 98 MMHG | HEIGHT: 61 IN

## 2025-03-23 DIAGNOSIS — J81.0 ACUTE PULMONARY EDEMA (H): ICD-10-CM

## 2025-03-23 DIAGNOSIS — R11.2 NAUSEA AND VOMITING, UNSPECIFIED VOMITING TYPE: ICD-10-CM

## 2025-03-23 DIAGNOSIS — N28.9 RENAL LESION: ICD-10-CM

## 2025-03-23 DIAGNOSIS — I48.91 ATRIAL FIBRILLATION WITH RVR (H): ICD-10-CM

## 2025-03-23 LAB
ALBUMIN SERPL BCG-MCNC: 4.1 G/DL (ref 3.5–5.2)
ALP SERPL-CCNC: 175 U/L (ref 40–150)
ALT SERPL W P-5'-P-CCNC: 170 U/L (ref 0–50)
ANION GAP SERPL CALCULATED.3IONS-SCNC: 13 MMOL/L (ref 7–15)
AST SERPL W P-5'-P-CCNC: ABNORMAL U/L
BASOPHILS # BLD AUTO: 0.1 10E3/UL (ref 0–0.2)
BASOPHILS NFR BLD AUTO: 1 %
BILIRUB DIRECT SERPL-MCNC: <0.08 MG/DL (ref 0–0.3)
BILIRUB SERPL-MCNC: 0.5 MG/DL
BUN SERPL-MCNC: 12.8 MG/DL (ref 6–20)
CALCIUM SERPL-MCNC: 8.6 MG/DL (ref 8.8–10.4)
CHLORIDE SERPL-SCNC: 103 MMOL/L (ref 98–107)
CREAT SERPL-MCNC: 0.86 MG/DL (ref 0.51–0.95)
EGFRCR SERPLBLD CKD-EPI 2021: 80 ML/MIN/1.73M2
EOSINOPHIL # BLD AUTO: 0.2 10E3/UL (ref 0–0.7)
EOSINOPHIL NFR BLD AUTO: 2 %
ERYTHROCYTE [DISTWIDTH] IN BLOOD BY AUTOMATED COUNT: 13.4 % (ref 10–15)
FLUAV RNA SPEC QL NAA+PROBE: NEGATIVE
FLUBV RNA RESP QL NAA+PROBE: NEGATIVE
GLUCOSE SERPL-MCNC: 111 MG/DL (ref 70–99)
HCO3 SERPL-SCNC: 24 MMOL/L (ref 22–29)
HCT VFR BLD AUTO: 38.3 % (ref 35–47)
HGB BLD-MCNC: 12.8 G/DL (ref 11.7–15.7)
IMM GRANULOCYTES # BLD: 0 10E3/UL
IMM GRANULOCYTES NFR BLD: 0 %
LIPASE SERPL-CCNC: 25 U/L (ref 13–60)
LYMPHOCYTES # BLD AUTO: 1.9 10E3/UL (ref 0.8–5.3)
LYMPHOCYTES NFR BLD AUTO: 21 %
MCH RBC QN AUTO: 30.5 PG (ref 26.5–33)
MCHC RBC AUTO-ENTMCNC: 33.4 G/DL (ref 31.5–36.5)
MCV RBC AUTO: 91 FL (ref 78–100)
MONOCYTES # BLD AUTO: 0.7 10E3/UL (ref 0–1.3)
MONOCYTES NFR BLD AUTO: 8 %
NEUTROPHILS # BLD AUTO: 6 10E3/UL (ref 1.6–8.3)
NEUTROPHILS NFR BLD AUTO: 67 %
NRBC # BLD AUTO: 0 10E3/UL
NRBC BLD AUTO-RTO: 0 /100
PLATELET # BLD AUTO: 267 10E3/UL (ref 150–450)
POTASSIUM SERPL-SCNC: 3.8 MMOL/L (ref 3.4–5.3)
PROT SERPL-MCNC: 7.5 G/DL (ref 6.4–8.3)
RBC # BLD AUTO: 4.2 10E6/UL (ref 3.8–5.2)
RSV RNA SPEC NAA+PROBE: NEGATIVE
SARS-COV-2 RNA RESP QL NAA+PROBE: NEGATIVE
SODIUM SERPL-SCNC: 140 MMOL/L (ref 135–145)
TSH SERPL DL<=0.005 MIU/L-ACNC: 1.4 UIU/ML (ref 0.3–4.2)
WBC # BLD AUTO: 8.9 10E3/UL (ref 4–11)

## 2025-03-23 PROCEDURE — 99152 MOD SED SAME PHYS/QHP 5/>YRS: CPT

## 2025-03-23 PROCEDURE — 250N000011 HC RX IP 250 OP 636: Performed by: EMERGENCY MEDICINE

## 2025-03-23 PROCEDURE — 92960 CARDIOVERSION ELECTRIC EXT: CPT

## 2025-03-23 PROCEDURE — 250N000009 HC RX 250: Performed by: EMERGENCY MEDICINE

## 2025-03-23 PROCEDURE — 85025 COMPLETE CBC W/AUTO DIFF WBC: CPT | Performed by: EMERGENCY MEDICINE

## 2025-03-23 PROCEDURE — 36415 COLL VENOUS BLD VENIPUNCTURE: CPT | Performed by: EMERGENCY MEDICINE

## 2025-03-23 PROCEDURE — 71046 X-RAY EXAM CHEST 2 VIEWS: CPT

## 2025-03-23 PROCEDURE — 84443 ASSAY THYROID STIM HORMONE: CPT | Performed by: EMERGENCY MEDICINE

## 2025-03-23 PROCEDURE — 83690 ASSAY OF LIPASE: CPT | Performed by: EMERGENCY MEDICINE

## 2025-03-23 PROCEDURE — 74177 CT ABD & PELVIS W/CONTRAST: CPT

## 2025-03-23 PROCEDURE — 999N000055 HC STATISTIC END TITIAL CO2 MONITORING

## 2025-03-23 PROCEDURE — 84460 ALANINE AMINO (ALT) (SGPT): CPT | Performed by: EMERGENCY MEDICINE

## 2025-03-23 PROCEDURE — 999N000157 HC STATISTIC RCP TIME EA 10 MIN

## 2025-03-23 PROCEDURE — 80048 BASIC METABOLIC PNL TOTAL CA: CPT | Performed by: EMERGENCY MEDICINE

## 2025-03-23 PROCEDURE — 99291 CRITICAL CARE FIRST HOUR: CPT | Mod: 25

## 2025-03-23 PROCEDURE — 87637 SARSCOV2&INF A&B&RSV AMP PRB: CPT | Performed by: EMERGENCY MEDICINE

## 2025-03-23 PROCEDURE — 96361 HYDRATE IV INFUSION ADD-ON: CPT

## 2025-03-23 PROCEDURE — 258N000003 HC RX IP 258 OP 636: Performed by: EMERGENCY MEDICINE

## 2025-03-23 PROCEDURE — 272N000240 HC CARDIOVERT/DEFIB/PACER SUPP

## 2025-03-23 PROCEDURE — 96374 THER/PROPH/DIAG INJ IV PUSH: CPT | Mod: 59

## 2025-03-23 PROCEDURE — 84155 ASSAY OF PROTEIN SERUM: CPT | Performed by: EMERGENCY MEDICINE

## 2025-03-23 RX ORDER — ONDANSETRON 4 MG/1
4 TABLET, ORALLY DISINTEGRATING ORAL EVERY 6 HOURS PRN
Qty: 20 TABLET | Refills: 0 | Status: SHIPPED | OUTPATIENT
Start: 2025-03-23

## 2025-03-23 RX ORDER — HYDROMORPHONE HYDROCHLORIDE 1 MG/ML
0.5 INJECTION, SOLUTION INTRAMUSCULAR; INTRAVENOUS; SUBCUTANEOUS EVERY 30 MIN PRN
Status: DISCONTINUED | OUTPATIENT
Start: 2025-03-23 | End: 2025-03-23 | Stop reason: HOSPADM

## 2025-03-23 RX ORDER — ONDANSETRON 2 MG/ML
4 INJECTION INTRAMUSCULAR; INTRAVENOUS EVERY 30 MIN PRN
Status: DISCONTINUED | OUTPATIENT
Start: 2025-03-23 | End: 2025-03-23 | Stop reason: HOSPADM

## 2025-03-23 RX ORDER — FUROSEMIDE 20 MG/1
20 TABLET ORAL DAILY
Qty: 3 TABLET | Refills: 0 | Status: SHIPPED | OUTPATIENT
Start: 2025-03-23 | End: 2025-03-26

## 2025-03-23 RX ORDER — IOPAMIDOL 755 MG/ML
500 INJECTION, SOLUTION INTRAVASCULAR ONCE
Status: COMPLETED | OUTPATIENT
Start: 2025-03-23 | End: 2025-03-23

## 2025-03-23 RX ORDER — DILTIAZEM HYDROCHLORIDE 5 MG/ML
25 INJECTION INTRAVENOUS ONCE
Status: COMPLETED | OUTPATIENT
Start: 2025-03-23 | End: 2025-03-23

## 2025-03-23 RX ORDER — ETOMIDATE 2 MG/ML
12 INJECTION INTRAVENOUS ONCE
Status: COMPLETED | OUTPATIENT
Start: 2025-03-23 | End: 2025-03-23

## 2025-03-23 RX ADMIN — ETOMIDATE 12 MG: 2 INJECTION, SOLUTION INTRAVENOUS at 18:18

## 2025-03-23 RX ADMIN — SODIUM CHLORIDE 1000 ML: 9 INJECTION, SOLUTION INTRAVENOUS at 17:00

## 2025-03-23 RX ADMIN — DILTIAZEM HYDROCHLORIDE 25 MG: 5 INJECTION, SOLUTION INTRAVENOUS at 17:00

## 2025-03-23 RX ADMIN — SODIUM CHLORIDE 100 ML: 9 INJECTION, SOLUTION INTRAVENOUS at 17:53

## 2025-03-23 RX ADMIN — IOPAMIDOL 100 ML: 755 INJECTION, SOLUTION INTRAVENOUS at 17:53

## 2025-03-23 ASSESSMENT — ACTIVITIES OF DAILY LIVING (ADL)
ADLS_ACUITY_SCORE: 59

## 2025-03-23 NOTE — ED PROVIDER NOTES
Emergency Department Note      History of Present Illness     Chief Complaint  Palpitations      HPI  Shilpa Miramontes is a 54 year old female with history of atrial fibrillation on Eliquis who presents with palpitations.  Patient reports she began experiencing chest discomfort earlier today as well as abdominal discomfort, dry heaves and dizziness.  She has had bouts of atrial fibrillation in the past which present with similar symptoms as today.  She also notes that her anxiety has been higher than normal as of late which tends to cause her A-fib to appear.  She has had a cardioversion in the past.  She has had a cholecystectomy in the past.  No alcohol or tobacco use.    Independent Historian  None    Review of External Notes  I reviewed the urgent care note from earlier today where she had an EKG done showing signs of atrial flutter. Subsequent referral to the ED for further workup.    Past Medical History   Medical History and Problem List  Diastolic heart failure   Morbid obesity   Hyperlipidemia   Atrial fibrillation with RVR   Essential hypertension  Nonrheumatic aortic valve stenosis  Pre-diabetes  Acute on chronic respiratory failure with hypoxemia   Chronic obstructive pulmonary disease      Medications  Eliquis  Atorvastatin  Diltiazem  Flonase  Lasix  Levalbuterol    Surgical History   Cholecystectomy  Cardioversion    Physical Exam   Patient Vitals for the past 24 hrs:   BP Temp Temp src Pulse Resp SpO2 Height   03/23/25 1930 98/82 -- -- 66 -- 95 % --   03/23/25 1925 102/81 -- -- 64 -- 93 % --   03/23/25 1920 104/59 -- -- 66 -- 94 % --   03/23/25 1914 100/57 -- -- 67 -- 95 % --   03/23/25 1909 103/67 -- -- 68 -- -- --   03/23/25 1859 105/72 -- -- 64 -- 94 % --   03/23/25 1844 103/64 -- -- 64 -- 94 % --   03/23/25 1835 103/76 -- -- 73 -- 94 % --   03/23/25 1830 120/70 -- -- 65 -- 94 % --   03/23/25 1823 115/74 -- -- 64 -- 95 % --   03/23/25 1815 -- -- -- 98 -- 96 % --   03/23/25 1815 111/77 -- -- --  "-- -- --   03/23/25 1814 110/74 -- -- 104 18 97 % --   03/23/25 1731 -- -- -- 85 -- 96 % --   03/23/25 1715 (!) 117/92 -- -- (!) 142 -- -- --   03/23/25 1700 (!) 119/92 -- -- (!) 129 -- -- --   03/23/25 1625 107/85 -- -- (!) 135 -- 94 % --   03/23/25 1620 -- 98.8  F (37.1  C) Oral -- 20 -- 1.549 m (5' 1\")     Physical Exam  Constitutional: Well developed, nontox appearance  Head: Atraumatic.   Mouth/Throat: Oropharynx is clear and dry  Neck:  no stridor  Eyes: no scleral icterus  Cardiovascular: Irregularly irregular tachycardia 2+ bilat radial pulses  Pulmonary/Chest: nml resp effort, Clear BS bilat  Abdominal: ND, soft, NT, no rebound or guarding   Ext: Warm, well perfused, no edema  Neurological: A&O, symmetric facies, moves ext x4  Skin: Skin is warm and dry.   Psychiatric: Behavior is normal. Thought content normal.   Nursing note and vitals reviewed.      Diagnostics   Lab Results   Labs Ordered and Resulted from Time of ED Arrival to Time of ED Departure   BASIC METABOLIC PANEL - Abnormal       Result Value    Sodium 140      Potassium 3.8      Chloride 103      Carbon Dioxide (CO2) 24      Anion Gap 13      Urea Nitrogen 12.8      Creatinine 0.86      GFR Estimate 80      Calcium 8.6 (*)     Glucose 111 (*)    HEPATIC FUNCTION PANEL - Abnormal    Protein Total 7.5      Albumin 4.1      Bilirubin Total 0.5      Alkaline Phosphatase 175 (*)     AST         (*)     Bilirubin Direct <0.08     TSH WITH FREE T4 REFLEX - Normal    TSH 1.40     LIPASE - Normal    Lipase 25     INFLUENZA A/B, RSV AND SARS-COV2 PCR - Normal    Influenza A PCR Negative      Influenza B PCR Negative      RSV PCR Negative      SARS CoV2 PCR Negative     CBC WITH PLATELETS AND DIFFERENTIAL    WBC Count 8.9      RBC Count 4.20      Hemoglobin 12.8      Hematocrit 38.3      MCV 91      MCH 30.5      MCHC 33.4      RDW 13.4      Platelet Count 267      % Neutrophils 67      % Lymphocytes 21      % Monocytes 8      % Eosinophils 2      " % Basophils 1      % Immature Granulocytes 0      NRBCs per 100 WBC 0      Absolute Neutrophils 6.0      Absolute Lymphocytes 1.9      Absolute Monocytes 0.7      Absolute Eosinophils 0.2      Absolute Basophils 0.1      Absolute Immature Granulocytes 0.0      Absolute NRBCs 0.0         Imaging  CT Abdomen Pelvis w Contrast   Final Result   IMPRESSION:    1.  No acute findings. No bowel obstruction, inflammatory process or hydronephrosis.   2.  Indeterminant 1.1 cm upper right renal cortical lesion. Differential considerations include a hemorrhagic cyst and renal neoplasm. This is likely too small for ultrasound detection or characterization. Recommend nonemergent further evaluation with    renal MRI.   3.  Additional chronic findings as described above.      XR Chest 2 Views   Final Result   IMPRESSION:       Diffuse interstitial prominence, suspicious for mild interstitial pulmonary edema.      No focal airspace disease. No pleural effusion or pneumothorax.      Borderline cardiomegaly.      Multilevel degenerative changes of the spine.        EKG   ECG results from 03/23/25   EKG 12-lead, tracing only     Value    Systolic Blood Pressure     Diastolic Blood Pressure     Ventricular Rate 127    Atrial Rate     LA Interval     QRS Duration 78        QTc 470    P Axis     R AXIS 69    T Axis 42    Interpretation ECG      Atrial fibrillation with rapid ventricular response  Abnormal ECG  When compared with ECG of 17-Dec-2024 18:11,  Atrial fibrillation has replaced Sinus rhythm  Vent. rate has increased by  51 bpm     EKG 12-lead, tracing only     Value    Systolic Blood Pressure     Diastolic Blood Pressure     Ventricular Rate 64    Atrial Rate 64    LA Interval 142    QRS Duration 86        QTc 439    P Axis 53    R AXIS 67    T Axis 64    Interpretation ECG      Sinus rhythm  Normal ECG  When compared with ECG of 23-Mar-2025 16:16, (unconfirmed)  Sinus rhythm has replaced Atrial fibrillation  Vent.  rate has decreased by  63 bpm       Independent Interpretation  CXR: No pneumothorax.  EKG significant for atrial fibrillation with RVR    ED Course    Medications Administered  Medications   ondansetron (ZOFRAN) injection 4 mg (has no administration in time range)   HYDROmorphone (PF) (DILAUDID) injection 0.5 mg (has no administration in time range)   midazolam (VERSED) injection 1 mg (has no administration in time range)   diltiazem (CARDIZEM) injection 25 mg (25 mg Intravenous $Given 3/23/25 1700)   sodium chloride 0.9% BOLUS 1,000 mL (0 mLs Intravenous Stopped 3/23/25 1929)   etomidate (AMIDATE) injection 12 mg (12 mg Intravenous $Given 3/23/25 1818)   sodium chloride for CT scan flush use (100 mLs Intravenous $Given 3/23/25 1753)   iopamidol (ISOVUE-370) solution 500 mL (100 mLs Intravenous $Given 3/23/25 1753)       Procedures    Sedation     Procedure: Procedural Sedation    Sedation Level: Moderate    Indication: Cardioversion    Consent: Written from Patient     Universal protocol: Universal protocol was followed and time out conducted just prior to starting procedure, confirming patient identity, site/side, procedure, patient position, and availability of correct equipment and implants.     Last PO Intake: Emergent procedure    ASA Class: Class 2 - A patient with mild systemic disease     Exam:  Mallampati:  Grade 3 - Soft palate and base of uvula present   Lungs: Clear   Heart: Regularly irregular rate and rhythm     Medication: Etomidate  Dose: 12 mg     Monitoring:  Monitoring consisted of heart rate, cardiac, continuous pulse oximetry, frequent blood pressure checks.   There was constant attendance by RN until patient recovered and constant attendance by physician until patient stable.   Intubation and emergency airway equipment available.     Response: Vital signs stable, oxygen saturations greater than 92%       Patient Status: Post procedure patient was alert.     Total Physician Drug Administration  / Monitoring Time: 8 minutes.     Patient was monitored during recovery and returned to pre-procedure baseline.     Electrical Cardioversion         Procedure: Electrical Cardioversion        Indication: Atrial Fibrillation     Consent: Written from Patient     Universal Protocol: Universal protocol was followed and time out conducted just prior to starting procedure, confirming patient identity, site/side, procedure, patient position, and availability of correct equipment and implants.      Anesthesia/Sedation: Sedation: The patient was sedated, see separate procedure note for details.      Procedure Detail:   Electrodes were placed: Anterior/posterior  Trial #1: Synchronized Cardioversion at 150 Joules   Cardioversion was successful      Patient Status:  The patient tolerated the procedure well: Yes. There were no complications.      Discussion of Management  None    Social Determinants of Health adding to complexity of care  None    ED Course  ED Course as of 03/23/25 2000   Sun Mar 23, 2025   1655 I evaluated the patient       Medical Decision Making / Diagnosis   CMS Diagnoses: None    MIPS  None    MDM  Shilpa Miramontes is a 54 year old female presenting w/ palpitations, dry heaving, nausea    DDx includes arrhythmia, atrial fibrillation, thyroid dysfunction, electrolyte abnormality, CHF.  EKG as noted above and significant for atrial fibrillation w/ RVR.  Doubt PE given chronically anticoagulated.  Labs significant for mild ALT elevation, viral swabs negative.  Imaging sig for mild pulmonary edema.   Attempted rate control in the emergency department with the above noted interventions which was successful although the patient in atrial fibrillation.  She elected to proceed with elective cardioversion which was successful (please see procedure note above.  Patient's pulmonary edema will likely resolve on that she is in normal sinus rhythm but she is given a short course of furosemide for diuresis should she  continue to feel short of breath.  She should continue her medications as previously prescribed.  At this time I feel the pt is safe for discharge.  Recommendations given regarding follow up with PCP, cardiology and return to the emergency department as needed for new or worsening symptoms.  Pt counseled on all results, disposition and diagnosis.  They are understanding and agreeable to plan. Patient discharged in stable condition.      Of note, the patient was also notified of an incidental finding of a right renal cortical lesion and advised to follow-up with her primary care provider for nonemergent MRI.      Disposition  The patient was discharged.     ICD-10 Codes:    ICD-10-CM    1. Renal lesion  N28.9       2. Atrial fibrillation with RVR (H)  I48.91       3. Nausea and vomiting, unspecified vomiting type  R11.2       4. Acute pulmonary edema (H)  J81.0            Discharge Medications  Discharge Medication List as of 3/23/2025  7:39 PM        START taking these medications    Details   !! furosemide (LASIX) 20 MG tablet Take 1 tablet (20 mg) by mouth daily for 3 days., Disp-3 tablet, R-0, Local Print      ondansetron (ZOFRAN ODT) 4 MG ODT tab Take 1 tablet (4 mg) by mouth every 6 hours as needed for nausea or vomiting., Disp-20 tablet, R-0, Local Print       !! - Potential duplicate medications found. Please discuss with provider.          Scribe Disclosure:  I, RY GUADARRAMA, am serving as a scribe at 4:25 PM on 3/23/2025 to document services personally performed by Demario Schumacher MD based on my observations and the provider's statements to me.     [employer]      Demario Schumacher MD  03/23/25 2005

## 2025-03-23 NOTE — ED TRIAGE NOTES
Pt arrives with c/o stomach discomfort, dry heaves and dizziness. Pt was concerned for norovirus and presented to  where she was found to be in atrial flutter RVR. Pt reports hx of this, takes eliquis twice daily.      Triage Assessment (Adult)       Row Name 03/23/25 3161          Triage Assessment    Airway WDL WDL        Respiratory WDL    Respiratory WDL WDL        Cardiac WDL    Cardiac WDL WDL        Peripheral/Neurovascular WDL    Peripheral Neurovascular WDL WDL        Cognitive/Neuro/Behavioral WDL    Cognitive/Neuro/Behavioral WDL WDL

## 2025-03-23 NOTE — PROGRESS NOTES
An ETCO2 monitor was placed on the pt with 2LPM bled in. The Ambu bag, suction, and any necessary airway supplies were setup and present in the room but not needed. Pt was able to maintain airway throughout the procedure with jaw thrust needed. ETCO2 levels were maintained between 28 and 32.

## 2025-03-24 LAB
ATRIAL RATE - MUSE: 64 BPM
ATRIAL RATE - MUSE: NORMAL BPM
DIASTOLIC BLOOD PRESSURE - MUSE: NORMAL MMHG
DIASTOLIC BLOOD PRESSURE - MUSE: NORMAL MMHG
INTERPRETATION ECG - MUSE: NORMAL
INTERPRETATION ECG - MUSE: NORMAL
P AXIS - MUSE: 53 DEGREES
P AXIS - MUSE: NORMAL DEGREES
PR INTERVAL - MUSE: 142 MS
PR INTERVAL - MUSE: NORMAL MS
QRS DURATION - MUSE: 78 MS
QRS DURATION - MUSE: 86 MS
QT - MUSE: 324 MS
QT - MUSE: 426 MS
QTC - MUSE: 439 MS
QTC - MUSE: 470 MS
R AXIS - MUSE: 67 DEGREES
R AXIS - MUSE: 69 DEGREES
SYSTOLIC BLOOD PRESSURE - MUSE: NORMAL MMHG
SYSTOLIC BLOOD PRESSURE - MUSE: NORMAL MMHG
T AXIS - MUSE: 42 DEGREES
T AXIS - MUSE: 64 DEGREES
VENTRICULAR RATE- MUSE: 127 BPM
VENTRICULAR RATE- MUSE: 64 BPM

## 2025-03-24 NOTE — DISCHARGE INSTRUCTIONS
Please return to the emergency department as needed for new or worsening symptoms including fainting, chest pain, severe shortness of breath, persistent high heart rate, any other concerning symptoms.    Discharge Instructions  Vomiting    You have been seen today for vomiting (throwing up). This is usually caused by a virus, but some bacteria, parasites, medicines or other medical conditions can cause similar symptoms. At this time your provider does not find that your vomiting is a sign of anything dangerous or life-threatening. However, sometimes the signs of serious illness do not show up right away. If you have new or worse symptoms, you may need to be seen again in the Emergency Department or by your primary provider. Remember that serious problems like appendicitis can start as vomiting.    Generally, every Emergency Department visit should have a follow-up clinic visit with either a primary or a specialty clinic/provider. Please follow-up as instructed by your emergency provider today.    Return to the Emergency Department if:  You keep vomiting and you are not able to keep liquids down.   You feel you are getting dehydrated, such as being very thirsty, not urinating (peeing) at least every 8-12 hours, or feeling faint or lightheaded.   You develop a new fever, or your fever continues for more than 2 days.   You have abdominal (belly pain) that seems worse than cramps, is in one spot, or is getting worse over time. Appendicitis usually causes pain in the right lower abdomen (to the right and below your belly button) so watch for pain in this location.  You have blood in your vomit or stools.   You feel very weak.  You are not starting to improve within 24 hours of your visit here.     What can I do to help myself?  The most important thing to do is to drink clear liquids. If you have been vomiting a lot, it is best to have only small, frequent sips of liquids. Drinking too much at once may cause more  vomiting. If you are vomiting often, you must replace minerals, sodium and potassium lost with your illness. Pedialyte  is the best available rehydration liquid but some find that it doesn t taste good so sports drinks are an alterative. You can also drink clear liquids such as water, weak tea, apple juice, and 7-Up . Avoid acid liquids (orange), caffeine (coffee) or alcohol. Do not drink milk until you no longer have diarrhea (loose stools).   After liquids are staying down, you may start eating mild foods. Soda crackers, toast, plain noodles, gelatin, applesauce and bananas are good first choices. Avoid foods that have acid, are spicy, fatty or have a lot of fiber (such as meats, coarse grains, vegetables). You may start eating these foods again in about 3 days when you are better.   Sometimes treatment includes prescription medicine to prevent nausea (sick to your stomach) and vomiting. If your provider prescribes these for you, take them as directed.   Do not take ibuprofen, naproxen, or other nonsteroidal anti-inflammatory (NSAID) medicines without checking with your healthcare provider.     If you were given a prescription for medicine here today, be sure to read all of the information (including the package insert) that comes with your prescription.  This will include important information about the medicine, its side effects, and any warnings that you need to know about.  The pharmacist who fills the prescription can provide more information and answer questions you may have about the medicine.  If you have questions or concerns that the pharmacist cannot address, please call or return to the Emergency Department.     Remember that you can always come back to the Emergency Department if you are not able to see your regular provider in the amount of time listed above, if you get any new symptoms, or if there is anything that worries you.

## 2025-07-02 ENCOUNTER — TELEPHONE (OUTPATIENT)
Dept: CARDIOLOGY | Facility: CLINIC | Age: 55
End: 2025-07-02
Payer: COMMERCIAL

## 2025-07-02 NOTE — TELEPHONE ENCOUNTER
1st attempt- Left voicemail for the patient to call back and schedule the following:    Appointment type:  Testing only- No clinic visit  Provider:  [facility]  Return date:  By 7/23/25  Additional appointment(s) needed:  No  Additional Notes:  No  Specialty phone number: [phone]

## 2025-08-16 ENCOUNTER — APPOINTMENT (OUTPATIENT)
Dept: GENERAL RADIOLOGY | Facility: CLINIC | Age: 55
End: 2025-08-16
Attending: EMERGENCY MEDICINE
Payer: COMMERCIAL

## 2025-08-16 ENCOUNTER — HOSPITAL ENCOUNTER (EMERGENCY)
Facility: CLINIC | Age: 55
Discharge: HOME | End: 2025-08-16
Attending: EMERGENCY MEDICINE
Payer: COMMERCIAL

## 2025-08-16 VITALS
RESPIRATION RATE: 30 BRPM | HEART RATE: 58 BPM | SYSTOLIC BLOOD PRESSURE: 111 MMHG | TEMPERATURE: 98.5 F | DIASTOLIC BLOOD PRESSURE: 68 MMHG | OXYGEN SATURATION: 93 %

## 2025-08-16 DIAGNOSIS — R06.02 SOB (SHORTNESS OF BREATH): ICD-10-CM

## 2025-08-16 DIAGNOSIS — I48.91 ATRIAL FIBRILLATION WITH RVR (H): Primary | ICD-10-CM

## 2025-08-16 LAB
ANION GAP SERPL CALCULATED.3IONS-SCNC: 16 MMOL/L (ref 7–15)
BASE EXCESS BLDV CALC-SCNC: -0.6 MMOL/L (ref -3–3)
BASOPHILS # BLD AUTO: 0.11 10E3/UL (ref 0–0.2)
BASOPHILS NFR BLD AUTO: 1.1 %
BUN SERPL-MCNC: 9.4 MG/DL (ref 6–20)
CALCIUM SERPL-MCNC: 9.7 MG/DL (ref 8.8–10.4)
CHLORIDE SERPL-SCNC: 107 MMOL/L (ref 98–107)
CREAT SERPL-MCNC: 0.93 MG/DL (ref 0.51–0.95)
EGFRCR SERPLBLD CKD-EPI 2021: 73 ML/MIN/1.73M2
EOSINOPHIL # BLD AUTO: 0.22 10E3/UL (ref 0–0.7)
EOSINOPHIL NFR BLD AUTO: 2.2 %
ERYTHROCYTE [DISTWIDTH] IN BLOOD BY AUTOMATED COUNT: 14.4 % (ref 10–15)
FLUAV RNA SPEC QL NAA+PROBE: NEGATIVE
FLUBV RNA RESP QL NAA+PROBE: NEGATIVE
GLUCOSE SERPL-MCNC: 118 MG/DL (ref 70–99)
HCO3 BLDV-SCNC: 24 MMOL/L (ref 21–28)
HCO3 SERPL-SCNC: 20 MMOL/L (ref 22–29)
HCT VFR BLD AUTO: 47.4 % (ref 35–47)
HGB BLD-MCNC: 15.9 G/DL (ref 11.7–15.7)
HOLD SPECIMEN: NORMAL
IMM GRANULOCYTES # BLD: 0.04 10E3/UL
IMM GRANULOCYTES NFR BLD: 0.4 %
LYMPHOCYTES # BLD AUTO: 2.24 10E3/UL (ref 0.8–5.3)
LYMPHOCYTES NFR BLD AUTO: 22.4 %
MAGNESIUM SERPL-MCNC: 2.2 MG/DL (ref 1.7–2.3)
MCH RBC QN AUTO: 30.2 PG (ref 26.5–33)
MCHC RBC AUTO-ENTMCNC: 33.5 G/DL (ref 31.5–36.5)
MCV RBC AUTO: 90.1 FL (ref 78–100)
MONOCYTES # BLD AUTO: 0.82 10E3/UL (ref 0–1.3)
MONOCYTES NFR BLD AUTO: 8.2 %
NEUTROPHILS # BLD AUTO: 6.56 10E3/UL (ref 1.6–8.3)
NEUTROPHILS NFR BLD AUTO: 65.7 %
NRBC # BLD AUTO: <0.03 10E3/UL
NRBC BLD AUTO-RTO: 0 /100
NT-PROBNP SERPL-MCNC: 3019 PG/ML (ref 0–192)
O2/TOTAL GAS SETTING VFR VENT: ABNORMAL %
OXYHGB MFR BLDV: 62 % (ref 70–75)
PCO2 BLDV: 38 MM HG (ref 40–50)
PH BLDV: 7.41 [PH] (ref 7.32–7.43)
PLATELET # BLD AUTO: 324 10E3/UL (ref 150–450)
PO2 BLDV: 32 MM HG (ref 25–47)
POTASSIUM SERPL-SCNC: 4.2 MMOL/L (ref 3.4–5.3)
RBC # BLD AUTO: 5.26 10E6/UL (ref 3.8–5.2)
RSV RNA SPEC NAA+PROBE: NEGATIVE
SAO2 % BLDV: 65.5 % (ref 70–75)
SARS-COV-2 RNA RESP QL NAA+PROBE: NEGATIVE
SODIUM SERPL-SCNC: 143 MMOL/L (ref 135–145)
TROPONIN T SERPL HS-MCNC: 11 NG/L
TROPONIN T SERPL HS-MCNC: 13 NG/L
TSH SERPL DL<=0.005 MIU/L-ACNC: 3.01 UIU/ML (ref 0.3–4.2)
WBC # BLD AUTO: 9.99 10E3/UL (ref 4–11)

## 2025-08-16 PROCEDURE — 36416 COLLJ CAPILLARY BLOOD SPEC: CPT | Performed by: EMERGENCY MEDICINE

## 2025-08-16 PROCEDURE — 250N000011 HC RX IP 250 OP 636: Performed by: EMERGENCY MEDICINE

## 2025-08-16 PROCEDURE — 96365 THER/PROPH/DIAG IV INF INIT: CPT | Mod: 59

## 2025-08-16 PROCEDURE — 92960 CARDIOVERSION ELECTRIC EXT: CPT | Performed by: EMERGENCY MEDICINE

## 2025-08-16 PROCEDURE — 96375 TX/PRO/DX INJ NEW DRUG ADDON: CPT

## 2025-08-16 PROCEDURE — 85025 COMPLETE CBC W/AUTO DIFF WBC: CPT | Performed by: EMERGENCY MEDICINE

## 2025-08-16 PROCEDURE — 84443 ASSAY THYROID STIM HORMONE: CPT | Performed by: EMERGENCY MEDICINE

## 2025-08-16 PROCEDURE — 82805 BLOOD GASES W/O2 SATURATION: CPT | Performed by: EMERGENCY MEDICINE

## 2025-08-16 PROCEDURE — 96376 TX/PRO/DX INJ SAME DRUG ADON: CPT | Mod: 59

## 2025-08-16 PROCEDURE — 84484 ASSAY OF TROPONIN QUANT: CPT | Performed by: EMERGENCY MEDICINE

## 2025-08-16 PROCEDURE — 87637 SARSCOV2&INF A&B&RSV AMP PRB: CPT | Performed by: EMERGENCY MEDICINE

## 2025-08-16 PROCEDURE — 96366 THER/PROPH/DIAG IV INF ADDON: CPT | Mod: 59

## 2025-08-16 PROCEDURE — 99291 CRITICAL CARE FIRST HOUR: CPT | Mod: 25 | Performed by: EMERGENCY MEDICINE

## 2025-08-16 PROCEDURE — 999N000157 HC STATISTIC RCP TIME EA 10 MIN

## 2025-08-16 PROCEDURE — 250N000009 HC RX 250: Performed by: EMERGENCY MEDICINE

## 2025-08-16 PROCEDURE — 36415 COLL VENOUS BLD VENIPUNCTURE: CPT | Performed by: EMERGENCY MEDICINE

## 2025-08-16 PROCEDURE — 80048 BASIC METABOLIC PNL TOTAL CA: CPT | Performed by: EMERGENCY MEDICINE

## 2025-08-16 PROCEDURE — 83735 ASSAY OF MAGNESIUM: CPT | Performed by: EMERGENCY MEDICINE

## 2025-08-16 PROCEDURE — 71045 X-RAY EXAM CHEST 1 VIEW: CPT

## 2025-08-16 PROCEDURE — 258N000003 HC RX IP 258 OP 636: Performed by: EMERGENCY MEDICINE

## 2025-08-16 PROCEDURE — 83880 ASSAY OF NATRIURETIC PEPTIDE: CPT | Performed by: EMERGENCY MEDICINE

## 2025-08-16 RX ORDER — FLUMAZENIL 0.1 MG/ML
0.2 INJECTION, SOLUTION INTRAVENOUS
Status: DISCONTINUED | OUTPATIENT
Start: 2025-08-16 | End: 2025-08-16

## 2025-08-16 RX ORDER — DILTIAZEM HYDROCHLORIDE 5 MG/ML
15 INJECTION INTRAVENOUS ONCE
Status: COMPLETED | OUTPATIENT
Start: 2025-08-16 | End: 2025-08-16

## 2025-08-16 RX ORDER — ETOMIDATE 2 MG/ML
0.15 INJECTION INTRAVENOUS ONCE
Status: COMPLETED | OUTPATIENT
Start: 2025-08-16 | End: 2025-08-16

## 2025-08-16 RX ORDER — DILTIAZEM HCL/D5W 125 MG/125
5-15 PLASTIC BAG, INJECTION (ML) INTRAVENOUS CONTINUOUS
Status: DISCONTINUED | OUTPATIENT
Start: 2025-08-16 | End: 2025-08-16 | Stop reason: HOSPADM

## 2025-08-16 RX ORDER — ONDANSETRON 2 MG/ML
4 INJECTION INTRAMUSCULAR; INTRAVENOUS ONCE
Status: COMPLETED | OUTPATIENT
Start: 2025-08-16 | End: 2025-08-16

## 2025-08-16 RX ORDER — ONDANSETRON 2 MG/ML
INJECTION INTRAMUSCULAR; INTRAVENOUS
Status: COMPLETED
Start: 2025-08-16 | End: 2025-08-16

## 2025-08-16 RX ADMIN — ONDANSETRON 4 MG: 2 INJECTION INTRAMUSCULAR; INTRAVENOUS at 16:38

## 2025-08-16 RX ADMIN — ONDANSETRON 4 MG: 2 INJECTION, SOLUTION INTRAMUSCULAR; INTRAVENOUS at 16:38

## 2025-08-16 RX ADMIN — SODIUM CHLORIDE 1000 ML: 0.9 INJECTION, SOLUTION INTRAVENOUS at 13:55

## 2025-08-16 RX ADMIN — ETOMIDATE 11 MG: 2 INJECTION, SOLUTION INTRAVENOUS at 16:30

## 2025-08-16 RX ADMIN — DILTIAZEM HYDROCHLORIDE 15 MG: 5 INJECTION, SOLUTION INTRAVENOUS at 13:56

## 2025-08-16 RX ADMIN — Medication 5 MG/HR: at 14:03

## 2025-08-16 ASSESSMENT — ACTIVITIES OF DAILY LIVING (ADL)
ADLS_ACUITY_SCORE: 59

## 2025-08-18 LAB
ATRIAL RATE - MUSE: 57 BPM
ATRIAL RATE - MUSE: NORMAL BPM
DIASTOLIC BLOOD PRESSURE - MUSE: NORMAL MMHG
DIASTOLIC BLOOD PRESSURE - MUSE: NORMAL MMHG
INTERPRETATION ECG - MUSE: NORMAL
INTERPRETATION ECG - MUSE: NORMAL
P AXIS - MUSE: 54 DEGREES
P AXIS - MUSE: NORMAL DEGREES
PR INTERVAL - MUSE: 158 MS
PR INTERVAL - MUSE: NORMAL MS
QRS DURATION - MUSE: 80 MS
QRS DURATION - MUSE: 92 MS
QT - MUSE: 274 MS
QT - MUSE: 454 MS
QTC - MUSE: 441 MS
QTC - MUSE: 470 MS
R AXIS - MUSE: 55 DEGREES
R AXIS - MUSE: 73 DEGREES
SYSTOLIC BLOOD PRESSURE - MUSE: NORMAL MMHG
SYSTOLIC BLOOD PRESSURE - MUSE: NORMAL MMHG
T AXIS - MUSE: -76 DEGREES
T AXIS - MUSE: 72 DEGREES
VENTRICULAR RATE- MUSE: 177 BPM
VENTRICULAR RATE- MUSE: 57 BPM

## (undated) RX ORDER — FENTANYL CITRATE-0.9 % NACL/PF 10 MCG/ML
PLASTIC BAG, INJECTION (ML) INTRAVENOUS
Status: DISPENSED
Start: 2024-05-14

## (undated) RX ORDER — ETOMIDATE 2 MG/ML
INJECTION INTRAVENOUS
Status: DISPENSED
Start: 2024-02-20